# Patient Record
Sex: MALE | Race: WHITE | NOT HISPANIC OR LATINO | Employment: OTHER | URBAN - NONMETROPOLITAN AREA
[De-identification: names, ages, dates, MRNs, and addresses within clinical notes are randomized per-mention and may not be internally consistent; named-entity substitution may affect disease eponyms.]

---

## 2017-04-27 ENCOUNTER — HOSPITAL ENCOUNTER (OUTPATIENT)
Dept: MRI IMAGING | Facility: HOSPITAL | Age: 62
Discharge: HOME | End: 2017-04-27
Attending: FAMILY MEDICINE | Admitting: FAMILY MEDICINE
Payer: COMMERCIAL

## 2017-04-27 DIAGNOSIS — M25.511 RIGHT SHOULDER PAIN: Primary | ICD-10-CM

## 2017-04-27 PROCEDURE — 73221 MRI JOINT UPR EXTREM W/O DYE: CPT | Mod: TC,RT

## 2017-09-19 DIAGNOSIS — Z13.220 SCREENING FOR LIPOID DISORDERS: Primary | ICD-10-CM

## 2017-09-19 LAB
ALBUMIN SERPL-MCNC: 3.7 G/DL (ref 3.4–5)
ALBUMIN UR-MCNC: NEGATIVE MG/DL
ALP SERPL-CCNC: 102 U/L (ref 40–150)
ALT SERPL W P-5'-P-CCNC: 32 U/L (ref 0–70)
ANION GAP SERPL CALCULATED.3IONS-SCNC: 8 MMOL/L (ref 3–14)
APPEARANCE UR: CLEAR
AST SERPL W P-5'-P-CCNC: 21 U/L (ref 0–45)
BASOPHILS # BLD AUTO: 0 10E9/L (ref 0–0.2)
BASOPHILS NFR BLD AUTO: 0.8 %
BILIRUB SERPL-MCNC: 0.5 MG/DL (ref 0.2–1.3)
BILIRUB UR QL STRIP: NEGATIVE
BUN SERPL-MCNC: 25 MG/DL (ref 7–30)
CALCIUM SERPL-MCNC: 9.1 MG/DL (ref 8.5–10.1)
CHLORIDE SERPL-SCNC: 106 MMOL/L (ref 94–109)
CHOLEST SERPL-MCNC: 195 MG/DL
CO2 SERPL-SCNC: 27 MMOL/L (ref 20–32)
COLOR UR AUTO: NORMAL
CREAT SERPL-MCNC: 0.88 MG/DL (ref 0.66–1.25)
CREAT UR-MCNC: 46 MG/DL
DIFFERENTIAL METHOD BLD: ABNORMAL
EOSINOPHIL # BLD AUTO: 0.1 10E9/L (ref 0–0.7)
EOSINOPHIL NFR BLD AUTO: 2.3 %
ERYTHROCYTE [DISTWIDTH] IN BLOOD BY AUTOMATED COUNT: 15.3 % (ref 10–15)
ERYTHROCYTE [SEDIMENTATION RATE] IN BLOOD BY WESTERGREN METHOD: 7 MM/H (ref 0–20)
GFR SERPL CREATININE-BSD FRML MDRD: 88 ML/MIN/1.7M2
GLUCOSE SERPL-MCNC: 76 MG/DL (ref 70–99)
GLUCOSE UR STRIP-MCNC: NEGATIVE MG/DL
HCT VFR BLD AUTO: 37.6 % (ref 40–53)
HDLC SERPL-MCNC: 63 MG/DL
HGB BLD-MCNC: 13 G/DL (ref 13.3–17.7)
HGB UR QL STRIP: NEGATIVE
IMM GRANULOCYTES # BLD: 0 10E9/L (ref 0–0.4)
IMM GRANULOCYTES NFR BLD: 0.4 %
KETONES UR STRIP-MCNC: NEGATIVE MG/DL
LDLC SERPL CALC-MCNC: 116 MG/DL
LEUKOCYTE ESTERASE UR QL STRIP: NEGATIVE
LYMPHOCYTES # BLD AUTO: 1 10E9/L (ref 0.8–5.3)
LYMPHOCYTES NFR BLD AUTO: 19.8 %
MCH RBC QN AUTO: 30.2 PG (ref 26.5–33)
MCHC RBC AUTO-ENTMCNC: 34.6 G/DL (ref 31.5–36.5)
MCV RBC AUTO: 87 FL (ref 78–100)
MICROALBUMIN UR-MCNC: <5 MG/L
MICROALBUMIN/CREAT UR: NORMAL MG/G CR (ref 0–17)
MONOCYTES # BLD AUTO: 0.6 10E9/L (ref 0–1.3)
MONOCYTES NFR BLD AUTO: 12.2 %
NEUTROPHILS # BLD AUTO: 3.4 10E9/L (ref 1.6–8.3)
NEUTROPHILS NFR BLD AUTO: 64.5 %
NITRATE UR QL: NEGATIVE
NONHDLC SERPL-MCNC: 132 MG/DL
NRBC # BLD AUTO: 0 10*3/UL
NRBC BLD AUTO-RTO: 0 /100
PH UR STRIP: 5 PH (ref 4.7–8)
PLATELET # BLD AUTO: 269 10E9/L (ref 150–450)
POTASSIUM SERPL-SCNC: 4.3 MMOL/L (ref 3.4–5.3)
PROT SERPL-MCNC: 7.1 G/DL (ref 6.8–8.8)
RBC # BLD AUTO: 4.31 10E12/L (ref 4.4–5.9)
SODIUM SERPL-SCNC: 141 MMOL/L (ref 133–144)
SOURCE: NORMAL
SP GR UR STRIP: 1.01 (ref 1–1.03)
TRIGL SERPL-MCNC: 80 MG/DL
TSH SERPL DL<=0.005 MIU/L-ACNC: 1.35 MU/L (ref 0.4–4)
UROBILINOGEN UR STRIP-MCNC: NORMAL MG/DL (ref 0–2)
WBC # BLD AUTO: 5.3 10E9/L (ref 4–11)

## 2017-09-19 PROCEDURE — 81003 URINALYSIS AUTO W/O SCOPE: CPT | Performed by: FAMILY MEDICINE

## 2017-09-19 PROCEDURE — 85025 COMPLETE CBC W/AUTO DIFF WBC: CPT | Performed by: FAMILY MEDICINE

## 2017-09-19 PROCEDURE — 80061 LIPID PANEL: CPT | Performed by: FAMILY MEDICINE

## 2017-09-19 PROCEDURE — 84443 ASSAY THYROID STIM HORMONE: CPT | Performed by: FAMILY MEDICINE

## 2017-09-19 PROCEDURE — 36415 COLL VENOUS BLD VENIPUNCTURE: CPT | Performed by: FAMILY MEDICINE

## 2017-09-19 PROCEDURE — 82043 UR ALBUMIN QUANTITATIVE: CPT | Performed by: FAMILY MEDICINE

## 2017-09-19 PROCEDURE — 80053 COMPREHEN METABOLIC PANEL: CPT | Performed by: FAMILY MEDICINE

## 2017-09-19 PROCEDURE — 85652 RBC SED RATE AUTOMATED: CPT | Performed by: FAMILY MEDICINE

## 2017-09-25 ENCOUNTER — TRANSFERRED RECORDS (OUTPATIENT)
Dept: HEALTH INFORMATION MANAGEMENT | Facility: HOSPITAL | Age: 62
End: 2017-09-25

## 2017-10-02 DIAGNOSIS — Z12.5 SCREENING FOR PROSTATE CANCER: Primary | ICD-10-CM

## 2017-10-02 LAB — PSA SERPL-ACNC: 0.87 UG/L (ref 0–4)

## 2017-10-02 PROCEDURE — G0103 PSA SCREENING: HCPCS | Performed by: FAMILY MEDICINE

## 2017-10-02 PROCEDURE — 36415 COLL VENOUS BLD VENIPUNCTURE: CPT | Performed by: FAMILY MEDICINE

## 2017-11-21 ENCOUNTER — HOSPITAL ENCOUNTER (OUTPATIENT)
Dept: PHYSICAL THERAPY | Facility: HOSPITAL | Age: 62
Setting detail: THERAPIES SERIES
End: 2017-11-21
Attending: PHYSICIAN ASSISTANT
Payer: COMMERCIAL

## 2017-11-21 PROCEDURE — 97161 PT EVAL LOW COMPLEX 20 MIN: CPT | Mod: GP

## 2017-11-21 PROCEDURE — 97110 THERAPEUTIC EXERCISES: CPT | Mod: GP

## 2017-11-21 PROCEDURE — 40000718 ZZHC STATISTIC PT DEPARTMENT ORTHO VISIT

## 2017-11-21 NOTE — PROGRESS NOTES
11/21/17 1300   General Information   Type of Visit Initial OP Ortho PT Evaluation   Start of Care Date 11/21/17   Referring Physician Munir Powers PA-C   Orders Evaluate and Treat   Orders Comment Essential post op protocol. Phase 2a x3 wks, phase 2b x 3 wks, phase 3 x 6 wks   Date of Order 11/10/17   Insurance Type ([employer])   Medical Diagnosis s/p R total shoulder replacement   Surgical/Medical history reviewed Yes   Body Part(s)   Body Part(s) Shoulder   Presentation and Etiology   Pertinent history of current problem (include personal factors and/or comorbidities that impact the POC) Pt reports he had his shoulder replaced 10/24/17. His next appointment with his surgeon on Dec. 22. Reports he has been wearing his sling and not using shoulder. He has some trouble sleeping due to pain, otherwise shoulder feels good. Has been doing table slides.   Impairments A. Pain;D. Decreased ROM;E. Decreased flexibility;F. Decreased strength and endurance   Functional Limitations perform activities of daily living   Symptom Location R shoulder   Onset date of current episode/exacerbation 10/24/17   Chronicity New   Pain rating (0-10 point scale) Best (/10);Worst (/10)   Best (/10) 1   Worst (/10) 1   Pain quality B. Dull   Frequency of pain/symptoms C. With activity   Pain/symptoms are: Worse during the day   Pain/symptoms exacerbated by C. Lifting   Pain/symptoms eased by C. Rest   Progression of symptoms since onset: Improved   Current Level of Function   Patient role/employment history F. Retired   Living environment House/[facility]   Shoulder Objective Findings   Side (if bilateral, select both right and left) Right   Right Shoulder Flexion PROM 90 degrees   Right Shoulder Abduction PROM 70 degrees   Right Shoulder ER PROM 20 degrees   Right Shoulder IR PROM 5 degrees   Right Shoulder Flexion Strength MMT not performed due to post surgical status but obviously functional weakness   Planned Therapy  Interventions   Planned Therapy Interventions strengthening;stretching;ROM;manual therapy;joint mobilization   Planned Modality Interventions   Planned Modality Interventions Cryotherapy;Electrical stimulation;Hot packs;Ultrasound   Clinical Impression   Criteria for Skilled Therapeutic Interventions Met yes, treatment indicated   PT Diagnosis R shoulder weakness, decreased ROM, pain s/p TSA   Influenced by the following impairments RUE weakness, impaired ROM, pain   Functional limitations due to impairments Decreased functional use of RUE due to above impairments   Clinical Presentation Stable/Uncomplicated   Clinical Presentation Rationale Clinical judgement   Clinical Decision Making (Complexity) Low complexity   Therapy Frequency 2 times/Week   Predicted Duration of Therapy Intervention (days/wks) up to 12 wks   Risk & Benefits of therapy have been explained Yes   Patient, Family & other staff in agreement with plan of care Yes   ORTHO GOALS   PT Ortho Eval Goals 1;2;3   Ortho Goal 1   Goal Identifier STG 1   Goal Description Pt will demonstrate knowledge/understanding of HEP and report daily compliance   Target Date 12/05/17   Ortho Goal 2   Goal Identifier LTG 1   Goal Description Pt will demonstrate full AROM RUE in order to return to PLOF   Target Date 02/13/18   Ortho Goal 3   Goal Identifier LTG 2   Goal Description Pt will use RUE during all daily activities without being limited by pain or weakness   Target Date 02/13/18   Total Evaluation Time   Total Evaluation Time 15

## 2017-11-28 ENCOUNTER — HOSPITAL ENCOUNTER (OUTPATIENT)
Dept: PHYSICAL THERAPY | Facility: HOSPITAL | Age: 62
Setting detail: THERAPIES SERIES
End: 2017-11-28
Attending: FAMILY MEDICINE
Payer: COMMERCIAL

## 2017-11-28 PROCEDURE — 40000718 ZZHC STATISTIC PT DEPARTMENT ORTHO VISIT

## 2017-11-28 PROCEDURE — 97110 THERAPEUTIC EXERCISES: CPT | Mod: GP

## 2017-12-01 ENCOUNTER — HOSPITAL ENCOUNTER (OUTPATIENT)
Dept: PHYSICAL THERAPY | Facility: HOSPITAL | Age: 62
Setting detail: THERAPIES SERIES
End: 2017-12-01
Attending: FAMILY MEDICINE
Payer: COMMERCIAL

## 2017-12-01 PROCEDURE — 97110 THERAPEUTIC EXERCISES: CPT | Mod: GP

## 2017-12-01 PROCEDURE — 40000718 ZZHC STATISTIC PT DEPARTMENT ORTHO VISIT

## 2017-12-04 ENCOUNTER — HOSPITAL ENCOUNTER (OUTPATIENT)
Dept: PHYSICAL THERAPY | Facility: HOSPITAL | Age: 62
Setting detail: THERAPIES SERIES
End: 2017-12-04
Attending: FAMILY MEDICINE
Payer: COMMERCIAL

## 2017-12-04 PROCEDURE — 97110 THERAPEUTIC EXERCISES: CPT | Mod: GP

## 2017-12-04 PROCEDURE — 40000718 ZZHC STATISTIC PT DEPARTMENT ORTHO VISIT

## 2017-12-06 ENCOUNTER — HOSPITAL ENCOUNTER (OUTPATIENT)
Dept: PHYSICAL THERAPY | Facility: HOSPITAL | Age: 62
Setting detail: THERAPIES SERIES
End: 2017-12-06
Attending: FAMILY MEDICINE
Payer: COMMERCIAL

## 2017-12-06 PROCEDURE — 40000718 ZZHC STATISTIC PT DEPARTMENT ORTHO VISIT

## 2017-12-06 PROCEDURE — 97110 THERAPEUTIC EXERCISES: CPT | Mod: GP

## 2017-12-08 ENCOUNTER — ANESTHESIA (OUTPATIENT)
Dept: SURGERY | Facility: HOSPITAL | Age: 62
End: 2017-12-08
Payer: COMMERCIAL

## 2017-12-08 ENCOUNTER — HOSPITAL ENCOUNTER (OUTPATIENT)
Facility: HOSPITAL | Age: 62
Discharge: HOME | End: 2017-12-08
Attending: INTERNAL MEDICINE | Admitting: INTERNAL MEDICINE
Payer: COMMERCIAL

## 2017-12-08 ENCOUNTER — ANESTHESIA EVENT (OUTPATIENT)
Dept: SURGERY | Facility: HOSPITAL | Age: 62
End: 2017-12-08
Payer: COMMERCIAL

## 2017-12-08 VITALS
RESPIRATION RATE: 18 BRPM | DIASTOLIC BLOOD PRESSURE: 98 MMHG | HEART RATE: 96 BPM | SYSTOLIC BLOOD PRESSURE: 167 MMHG | WEIGHT: 232 LBS | BODY MASS INDEX: 32.48 KG/M2 | TEMPERATURE: 97.6 F | OXYGEN SATURATION: 99 % | HEIGHT: 71 IN

## 2017-12-08 PROCEDURE — 25000125 ZZHC RX 250: Performed by: NURSE ANESTHETIST, CERTIFIED REGISTERED

## 2017-12-08 PROCEDURE — 40000306 ZZH STATISTIC PRE PROC ASSESS II: Performed by: INTERNAL MEDICINE

## 2017-12-08 PROCEDURE — 25000128 H RX IP 250 OP 636: Performed by: ANESTHESIOLOGY

## 2017-12-08 PROCEDURE — 01999 UNLISTED ANES PROCEDURE: CPT | Performed by: NURSE ANESTHETIST, CERTIFIED REGISTERED

## 2017-12-08 PROCEDURE — 37000008 ZZH ANESTHESIA TECHNICAL FEE, 1ST 30 MIN: Performed by: INTERNAL MEDICINE

## 2017-12-08 PROCEDURE — 36000050 ZZH SURGERY LEVEL 2 1ST 30 MIN: Performed by: INTERNAL MEDICINE

## 2017-12-08 PROCEDURE — 25000128 H RX IP 250 OP 636: Performed by: NURSE ANESTHETIST, CERTIFIED REGISTERED

## 2017-12-08 PROCEDURE — 71000027 ZZH RECOVERY PHASE 2 EACH 15 MINS: Performed by: INTERNAL MEDICINE

## 2017-12-08 RX ORDER — SODIUM CHLORIDE, SODIUM LACTATE, POTASSIUM CHLORIDE, CALCIUM CHLORIDE 600; 310; 30; 20 MG/100ML; MG/100ML; MG/100ML; MG/100ML
INJECTION, SOLUTION INTRAVENOUS CONTINUOUS
Status: DISCONTINUED | OUTPATIENT
Start: 2017-12-08 | End: 2017-12-08 | Stop reason: HOSPADM

## 2017-12-08 RX ORDER — HYDRALAZINE HYDROCHLORIDE 20 MG/ML
2.5-5 INJECTION INTRAMUSCULAR; INTRAVENOUS EVERY 10 MIN PRN
Status: DISCONTINUED | OUTPATIENT
Start: 2017-12-08 | End: 2017-12-08 | Stop reason: HOSPADM

## 2017-12-08 RX ORDER — MEPERIDINE HYDROCHLORIDE 25 MG/ML
12.5 INJECTION INTRAMUSCULAR; INTRAVENOUS; SUBCUTANEOUS
Status: DISCONTINUED | OUTPATIENT
Start: 2017-12-08 | End: 2017-12-08 | Stop reason: HOSPADM

## 2017-12-08 RX ORDER — NALOXONE HYDROCHLORIDE 0.4 MG/ML
.1-.4 INJECTION, SOLUTION INTRAMUSCULAR; INTRAVENOUS; SUBCUTANEOUS
Status: DISCONTINUED | OUTPATIENT
Start: 2017-12-08 | End: 2017-12-08 | Stop reason: HOSPADM

## 2017-12-08 RX ORDER — KETOROLAC TROMETHAMINE 30 MG/ML
30 INJECTION, SOLUTION INTRAMUSCULAR; INTRAVENOUS EVERY 6 HOURS PRN
Status: DISCONTINUED | OUTPATIENT
Start: 2017-12-08 | End: 2017-12-08 | Stop reason: HOSPADM

## 2017-12-08 RX ORDER — FENTANYL CITRATE 50 UG/ML
25-50 INJECTION, SOLUTION INTRAMUSCULAR; INTRAVENOUS
Status: DISCONTINUED | OUTPATIENT
Start: 2017-12-08 | End: 2017-12-08 | Stop reason: HOSPADM

## 2017-12-08 RX ORDER — LIDOCAINE HYDROCHLORIDE 20 MG/ML
INJECTION, SOLUTION INFILTRATION; PERINEURAL PRN
Status: DISCONTINUED | OUTPATIENT
Start: 2017-12-08 | End: 2017-12-08

## 2017-12-08 RX ORDER — PROPOFOL 10 MG/ML
INJECTION, EMULSION INTRAVENOUS PRN
Status: DISCONTINUED | OUTPATIENT
Start: 2017-12-08 | End: 2017-12-08

## 2017-12-08 RX ORDER — ONDANSETRON 4 MG/1
4 TABLET, ORALLY DISINTEGRATING ORAL EVERY 30 MIN PRN
Status: DISCONTINUED | OUTPATIENT
Start: 2017-12-08 | End: 2017-12-08 | Stop reason: HOSPADM

## 2017-12-08 RX ORDER — LIDOCAINE 40 MG/G
CREAM TOPICAL
Status: DISCONTINUED | OUTPATIENT
Start: 2017-12-08 | End: 2017-12-08 | Stop reason: HOSPADM

## 2017-12-08 RX ORDER — PROMETHAZINE HYDROCHLORIDE 25 MG/ML
12.5 INJECTION, SOLUTION INTRAMUSCULAR; INTRAVENOUS
Status: DISCONTINUED | OUTPATIENT
Start: 2017-12-08 | End: 2017-12-08 | Stop reason: HOSPADM

## 2017-12-08 RX ORDER — DEXAMETHASONE SODIUM PHOSPHATE 4 MG/ML
4 INJECTION, SOLUTION INTRA-ARTICULAR; INTRALESIONAL; INTRAMUSCULAR; INTRAVENOUS; SOFT TISSUE EVERY 10 MIN PRN
Status: DISCONTINUED | OUTPATIENT
Start: 2017-12-08 | End: 2017-12-08 | Stop reason: HOSPADM

## 2017-12-08 RX ORDER — ONDANSETRON 2 MG/ML
4 INJECTION INTRAMUSCULAR; INTRAVENOUS EVERY 30 MIN PRN
Status: DISCONTINUED | OUTPATIENT
Start: 2017-12-08 | End: 2017-12-08 | Stop reason: HOSPADM

## 2017-12-08 RX ORDER — ALBUTEROL SULFATE 0.83 MG/ML
2.5 SOLUTION RESPIRATORY (INHALATION) EVERY 4 HOURS PRN
Status: DISCONTINUED | OUTPATIENT
Start: 2017-12-08 | End: 2017-12-08 | Stop reason: HOSPADM

## 2017-12-08 RX ADMIN — PROPOFOL 20 MG: 10 INJECTION, EMULSION INTRAVENOUS at 12:13

## 2017-12-08 RX ADMIN — PROPOFOL 20 MG: 10 INJECTION, EMULSION INTRAVENOUS at 12:12

## 2017-12-08 RX ADMIN — LIDOCAINE HYDROCHLORIDE 40 MG: 20 INJECTION, SOLUTION INFILTRATION; PERINEURAL at 12:12

## 2017-12-08 RX ADMIN — PROPOFOL 20 MG: 10 INJECTION, EMULSION INTRAVENOUS at 12:20

## 2017-12-08 RX ADMIN — SODIUM CHLORIDE, POTASSIUM CHLORIDE, SODIUM LACTATE AND CALCIUM CHLORIDE: 600; 310; 30; 20 INJECTION, SOLUTION INTRAVENOUS at 10:13

## 2017-12-08 RX ADMIN — MIDAZOLAM 2 MG: 1 INJECTION INTRAMUSCULAR; INTRAVENOUS at 12:09

## 2017-12-08 RX ADMIN — PROPOFOL 20 MG: 10 INJECTION, EMULSION INTRAVENOUS at 12:14

## 2017-12-08 RX ADMIN — PROPOFOL 20 MG: 10 INJECTION, EMULSION INTRAVENOUS at 12:22

## 2017-12-08 RX ADMIN — PROPOFOL 20 MG: 10 INJECTION, EMULSION INTRAVENOUS at 12:16

## 2017-12-08 RX ADMIN — PROPOFOL 20 MG: 10 INJECTION, EMULSION INTRAVENOUS at 12:15

## 2017-12-08 RX ADMIN — PROPOFOL 20 MG: 10 INJECTION, EMULSION INTRAVENOUS at 12:24

## 2017-12-08 ASSESSMENT — LIFESTYLE VARIABLES: TOBACCO_USE: 1

## 2017-12-08 NOTE — IP AVS SNAPSHOT
[facility] Preop/Phase II    [address] [address]    Phone:  [phone]                                       After Visit Summary   12/8/2017    Ramy Guillermo    MRN: [id number]           After Visit Summary Signature Page     I have received my discharge instructions, and my questions have been answered. I have discussed any challenges I see with this plan with the nurse or doctor.    ..........................................................................................................................................  Patient/Patient Representative Signature      ..........................................................................................................................................  Patient Representative Print Name and Relationship to Patient    ..................................................               ................................................  Date                                            Time    ..........................................................................................................................................  Reviewed by Signature/Title    ...................................................              ..............................................  Date                                                            Time

## 2017-12-08 NOTE — ANESTHESIA POSTPROCEDURE EVALUATION
Patient: Ramy Guillermo    Procedure(s):  COLONOSCOPY - Wound Class: II-Clean Contaminated    Diagnosis:HISTORY OF COLON POLYPS  Diagnosis Additional Information: No value filed.    Anesthesia Type:  MAC    Note:  Anesthesia Post Evaluation    Patient participation: Able to fully participate in evaluation  Level of consciousness: awake  Pain management: adequate  Airway patency: patent  Cardiovascular status: acceptable  Respiratory status: acceptable  Hydration status: acceptable  PONV: none     Anesthetic complications: None          Last vitals:  Vitals:    12/08/17 1014 12/08/17 1233 12/08/17 1235   BP: 152/97 148/92 140/88   Pulse: 96     Resp: 18 18 18   Temp: 97.6  F (36.4  C)     SpO2: 97% 94% 95%         Electronically Signed By: ABRAHAM Diaz CRNA  December 8, 2017  12:39 PM

## 2017-12-08 NOTE — ANESTHESIA PREPROCEDURE EVALUATION
Anesthesia Evaluation     . Pt has had prior anesthetic.     History of anesthetic complications   - PONV        ROS/MED HX    ENT/Pulmonary:     (+)sleep apnea, allergic rhinitis, other ENT- Velopharyngeal insufficiency, s/p UPPP, tobacco use, Past use quit 2003 packs/day  Intermittent asthma Last exacerbation: 2 weeks ago last used,Treatment: Inhaler prn,  doesn't use CPAP , . .    Neurologic:  - neg neurologic ROS     Cardiovascular:     (+) Dyslipidemia, hypertension-range: not on beta blocker, ---. : . . . :. . Previous cardiac testing date:results:date: results:ECG reviewed date:9/25/2017 results:SR@63, Possible LAE date: results:          METS/Exercise Tolerance:  >4 METS   Hematologic:     (+) Anemia, -      Musculoskeletal:   (+) arthritis, , , other musculoskeletal- DJD, Lumbar spondylosis      GI/Hepatic:     (+) GERD Asymptomatic on medication, bowel prep,       Renal/Genitourinary:     (+) Other Renal/ Genitourinary, Overactive bladder      Endo:     (+) Obesity, .      Psychiatric:  - neg psychiatric ROS       Infectious Disease:  - neg infectious disease ROS       Malignancy:      - no malignancy   Other:    (+) No chance of pregnancy C-spine cleared: N/A, no H/O Chronic Pain,no other significant disability   - neg other ROS                 Physical Exam  Normal systems: cardiovascular, pulmonary and dental    Airway   Mallampati: I  TM distance: >3 FB  Neck ROM: full    Dental     Cardiovascular   Rhythm and rate: regular and normal      Pulmonary    breath sounds clear to auscultation                    Anesthesia Plan      History & Physical Review  History and physical reviewed and following examination; no interval change.    ASA Status:  3 .        Plan for MAC with Intravenous and Propofol induction. Maintenance will be TIVA.  Reason for MAC:  Chronic cardiopulmonary disease (G9) and Other - see comments  PONV prophylaxis:  Ondansetron (or other 5HT-3)  Surgeon requests deep sedation. Patient  is an ASA 3. Will provide MAC.  Risks and benefits of MAC anesthetic discussed including dental damage, aspiration, loss of airway, conversion to general anesthetic, CV complications, MI, stroke, death. Pt wishes to proceed.       Postoperative Care  Postoperative pain management:  IV analgesics.      Consents  Anesthetic plan, risks, benefits and alternatives discussed with:  Patient..                          .

## 2017-12-08 NOTE — DISCHARGE INSTRUCTIONS
INSTRUCTIONS AFTER COLONOSCOPY    WHEN YOU ARE BACK HOME:    Plan to rest for an hour or two after you get home.    You may have some cramping or pressure until you pass gas.    You may resume your regular medications.    Eat a small, light meal at first, and then gradually return to normal meal sizes.  If you had a polyp removed:    Slight bleeding may occur.  You may have a slight blood stain on the toilet paper after a bowel movement.    To lessen the chance of bleeding, avoid heavy exercise for ONE WEEK.  This includes heavy lifting, vigorous sport activities, and heavy physical labor.  You may resume your normal sexual activity.      Avoid aspirin or aspirin products if instructed by your doctor.    WHAT TO WATCH FOR:  Problems rarely occur after the exam; however, it is important for you to watch for early signs of possible problems.  If you have     Unusual pain in your abdomen    Nausea and vomiting that persists    Excessive bleeding    Black or bloody bowel movements    Fever or temperature above 100.6 F  Please call your doctor ([facility] [phone]) or go to the nearest hospital emergency room.    Post-Anesthesia Patient Instructions    IMMEDIATELY FOLLOWING SURGERY:  Do not drive or operate machinery for the first twenty four hours after surgery.  Do not make any important decisions for twenty four hours after surgery or while taking narcotic pain medications or sedatives.  If you develop intractable nausea and vomiting or a severe headache please notify your doctor immediately.    FOLLOW-UP:  Please make an appointment with your surgeon as instructed. You do not need to follow up with anesthesia unless specifically instructed to do so.    WOUND CARE INSTRUCTIONS (if applicable):  Keep a dry clean dressing on the anesthesia/puncture wound site if there is drainage.  Once the wound has quit draining you may leave it open to air.  Generally you should leave the bandage intact for twenty four  hours unless there is drainage.  If the epidural site drains for more than 36-48 hours please call the anesthesia department.    QUESTIONS?:  Please feel free to call your physician or the hospital  if you have any questions, and they will be happy to assist you.

## 2017-12-08 NOTE — ANESTHESIA CARE TRANSFER NOTE
Patient: Ramy Guillermo    Procedure(s):  COLONOSCOPY - Wound Class: II-Clean Contaminated    Diagnosis: HISTORY OF COLON POLYPS  Diagnosis Additional Information: No value filed.    Anesthesia Type:   MAC     Note:  Airway :Nasal Cannula  Patient transferred to:Phase II  Comments: VSS, alert asymptomatic. Handoff Report: Identifed the Patient, Identified the Reponsible Provider, Reviewed the pertinent medical history, Discussed the surgical course, Reviewed Intra-OP anesthesia mangement and issues during anesthesia, Set expectations for post-procedure period and Allowed opportunity for questions and acknowledgement of understanding      Vitals: (Last set prior to Anesthesia Care Transfer)    CRNA VITALS  12/8/2017 1201 - 12/8/2017 1231      12/8/2017             Pulse: 66    SpO2: 100 %    Resp Rate (set): 8                Electronically Signed By: ABRAHAM Garcia CRNA  December 8, 2017  12:31 PM

## 2017-12-08 NOTE — H&P
Pre-Endoscopy History and Physical     Ramy Guillermo MRN# [id number]   YOB: 1955 Age: 62 year old     Date of Procedure: 12/8/2017  Primary care provider: Mary Wooten  Type of Endoscopy: Colonoscopy with possible biopsy, possible polypectomy  Reason for Procedure: Colon Polyps, Poor Prep  Type of Anesthesia Anticipated: MAC    HPI:    Ramy is a 62 year old male who will be undergoing the above procedure.      A history and physical has been performed. The patient's medications and allergies have been reviewed. The risks and benefits of the procedure and the sedation options and risks were discussed with the patient.  All questions were answered and informed consent was obtained.      He denies a personal or family history of anesthesia complications or bleeding disorders.     Patient Active Problem List   Diagnosis     Essential hypertension, benign     GERD (gastroesophageal reflux disease)     Seasonal allergic rhinitis     Lumbar spondylosis     Family history of early CAD     Overactive bladder     CARDIOVASCULAR SCREENING; LDL GOAL LESS THAN 130     Hypertension goal BP (blood pressure) < 140/90     Advanced directives, counseling/discussion     Varicose veins of lower extremities with complications     History of adenomatous polyp of colon     Erectile dysfunction     Chondromalacia, knee - right     Lateral meniscus tear - right     Medial meniscus tear - right     S/P arthroscopy of right knee     Osteoarthritis of shoulder     Biceps tendonitis     Overweight (BMI 25.0-29.9)     Dysphagia     Velopharyngeal insufficiency, acquired     Dysphonia     LPRD (laryngopharyngeal reflux disease)     Shoulder impingement     Rotator cuff dysfunction     Family history of colon cancer        Past Medical History:   Diagnosis Date     Allergic rhinitis      Certain adverse effects, not elsewhere classified, other     required more sedation preop R vein ablation     Colon adenoma 8/3/2012     colonoscopy due 2017      DJD (degenerative joint disease) of lumbar spine 2003    L4-L5 & L5-S1     Duodenal ulcer, unspecified as acute or chronic, without hemorrhage, perforation, or obstruction      Essential hypertension, benign 2006     GERD (gastroesophageal reflux disease)         Past Surgical History:   Procedure Laterality Date     ABLATE VEIN VARICOSE RADIO FREQUENCY WITH PHLEBECTOMY MULTIPLE STAB  10/7/11, 11/4/11    RT, LT Dr. Cruz Robert     ABLATE VEIN VARICOSE RADIO FREQUENCY WITH PHLEBECTOMY MULTIPLE STAB  10/7/2011    Procedure:ABLATE VEIN VARICOSE RADIO FREQUENCY WITH PHLEBECTOMY MULTIPLE STAB; Right Radio Frenquency with Greater Saphenous with  Phlebectomy Multiple Stab; Surgeon:CRUZ ROBERT; Location:[facility] OR     ABLATE VEIN VARICOSE RADIO FREQUENCY WITH PHLEBECTOMY MULTIPLE STAB  11/4/2011    Procedure:ABLATE VEIN VARICOSE RADIO FREQUENCY WITH PHLEBECTOMY MULTIPLE STAB; Left Radio Frenquency with Greater Saphenous Vein with  Bilateral Phlebectomy Multiple Stab Varicose Veins.; Surgeon:CRUZ ROBERT; Location:[facility] OR     ABLATE VEIN VARICOSE RADIO FREQUENCY WITH PHLEBECTOMY MULTIPLE STAB  11/28/2012    Procedure: ABLATE VEIN VARICOSE RADIO FREQUENCY WITH PHLEBECTOMY MULTIPLE STAB;   Bilateral stab Micro- phlebectomies;  Surgeon: Dany Mascorro MD;  Location: [facility] OR     ARTHROSCOPY KNEE  3/1/2013    Procedure: ARTHROSCOPY KNEE;  Right knee arthoscopy w/ medial menisectomy and chondroplasty;  Surgeon: Abiodun Jacobson MD;  Location: [facility] OR     C EXCIS BOWEL LESION(S),SINGLE ENTEROTOMY  1968    small intestine     COLONOSCOPY N/A 8/30/2016    Procedure: COLONOSCOPY;  Surgeon: Calos Post MD;  Location: [facility] OR     FLEXIBLE SIGMOIDOSCOPY  1/9/07     HC COLONOSCOPY W BIOPSY  8/3/12    proximal ascending, tubular adenoma     HC EXCISION OF UVULA  2001    for snoring     HC KNEE SCOPE,MED/LAT MENISECTOMY  7/11/05    RT + chondroplasty of medial femoral condyl & patellofemoral joint,   [place]     HC PARTIAL REMOVAL/REPAIR,ACROMION  13    Left, Neer     HC REPAIR BICEPS LONG TENDON  13    Left     HERNIA REPAIR, INGUINAL RT/LT  01/10/08    LT w/onlay mesh patch, Dr. Mchugh     JOINT REPLACEMENT, HIP RT/LT      RT hip RESURFACING arthroplasty, Dr. Ra Valdivia @[facility]       Social History   Substance Use Topics     Smoking status: Former Smoker     Quit date: 2003     Smokeless tobacco: Never Used     Alcohol use Yes      Comment: 3-4 /week       Family History   Problem Relation Age of Onset     Hypertension Father      GASTROINTESTINAL DISEASE Father      PUD     Colon Cancer Father 80     malignant rectal polyp     Myocardial Infarction Brother 48          Myocardial Infarction Mother      Thrombophilia Mother 75      of PE     Prostate Cancer Maternal Grandfather      Prostate Cancer Maternal Uncle      Prostate Cancer Paternal Uncle      DIABETES No family hx of      Anesthesia Reaction No family hx of      Blood Disease No family hx of        Prior to Admission medications    Medication Sig Start Date End Date Taking? Authorizing Provider   Naproxen Sodium (ALEVE PO) Take 220 mg by mouth 2 times daily as needed for moderate pain   Yes Reported, Patient   IBUPROFEN PO Take 200 mg by mouth every 6 hours as needed for moderate pain   Yes Reported, Patient   pramipexole (MIRAPEX) 0.5 MG tablet 0.5 mg At Bedtime  11/12/15  Yes Reported, Patient   omeprazole (PRILOSEC) 40 MG capsule Take 1 capsule (40 mg) by  mouth daily before a meal  for reflux. 10/20/15  Yes Jamshid Ventura MD   oxybutynin (DITROPAN) 5 MG tablet Take 1 tablet (5 mg) by  mouth 2 times daily as  needed for overactive  bladder. 6/16/15  Yes Jamshid Ventura MD   lisinopril (PRINIVIL,ZESTRIL) 20 MG tablet Take 1 tablet (20 mg) by mouth daily for blood pressure. 6/5/15  Yes Jamshid Ventura MD   zolpidem (AMBIEN) 10 MG tablet Take 0.5-1 tablets (5-10 mg) by mouth nightly as needed for sleep (take right  "at bedtime) 6/5/15  Yes Jamshid Ventura MD   sildenafil (VIAGRA) 100 MG tablet Take 0.5-1 tablets ( mg) by mouth daily as needed for erectile dysfunction (1-3 hours before intimacy) Maximum 1 dose per 24 hours. 12/16/14  Yes Jamshid Ventura MD   acetaminophen (TYLENOL) 325 MG tablet Take 2 tablets by mouth every 6 hours as needed.   Yes Reported, Patient   MULTI VITAMIN MENS OR TABS 1 TABLET DAILY   Yes Reported, Patient   methocarbamol (ROBAXIN) 750 MG tablet Take 1-2 tablets (750-1,500 mg) by mouth every 6 hours as needed for muscle spasm. 6/5/15   Jamshid Ventura MD       Allergies   Allergen Reactions     No Known Drug Allergies         REVIEW OF SYSTEMS:   5 point ROS negative except as noted above in HPI, including Gen., Resp., CV, GI &  system review.    PHYSICAL EXAM:   /97  Pulse 96  Temp 97.6  F (36.4  C) (Oral)  Resp 18  Ht 1.803 m (5' 11\")  Wt 105.2 kg (232 lb)  SpO2 97%  BMI 32.36 kg/m2 Estimated body mass index is 32.36 kg/(m^2) as calculated from the following:    Height as of this encounter: 1.803 m (5' 11\").    Weight as of this encounter: 105.2 kg (232 lb).   GENERAL APPEARANCE: alert, and oriented  MENTAL STATUS: alert  AIRWAY EXAM: Mallampatti Class I (visualization of the soft palate, fauces, uvula, anterior and posterior pillars)  RESP: lungs clear to auscultation - no rales, rhonchi or wheezes  CV: regular rates and rhythm  DIAGNOSTICS:    Not indicated    IMPRESSION   ASA Class 2 - Mild systemic disease    PLAN:   Plan for Colonoscopy with possible biopsy, possible polypectomy. We discussed the risks, benefits and alternatives and the patient wished to proceed.    The above has been forwarded to the consulting provider.      Signed Electronically by: Calos Post  December 8, 2017          "

## 2017-12-08 NOTE — OR NURSING
Expelled air.Denies discomfort.Took juice and cookies w/o nausea.Anesthesia and  in and spoke with patient.Patient and responsible adult given discharge instructions with no questions regarding instructions. Julio score 20. Pain level 0/10.  Discharged from unit via walkingPatient discharged to home.

## 2017-12-08 NOTE — BRIEF OP NOTE
[facility] Brief Op Note    Pre-operative diagnosis: HISTORY OF COLON POLYPS   Post-operative diagnosis Diverticulosis, Internal Hemorrhoids   Procedure: Procedure(s):  COLONOSCOPY - Wound Class: II-Clean Contaminated   Surgeon: Calos Post MD   Anesthesia: Monitor Anesthesia Care    Total IV fluids: (See anesthesia record)   Drains: None   Specimens: None   Findings: Diverticulosis, Internal Hemorrhoids   Complications: None   Condition: Stable   Comments: See dictated operative report for full details     Calos Post MD  12/8/2017  12:35 PM

## 2017-12-09 NOTE — OP NOTE
PRIMARY CARE PHYSICIAN:  Mary Wooten MD      PROCEDURE:  Colonoscopy.      PREOPERATIVE DIAGNOSES:    1.  History of polyps.   2.  Poor prep.      HISTORY OF PRESENT ILLNESS:  Please refer to H&P for further details.      PHYSICAL EXAMINATION:  Cardiopulmonary examination unchanged from previous exam.  Please refer to H&P for further details.      MEDICATIONS:  MAC per Anesthesia Service.  Monitoring parameters within normal limits throughout.      DESCRIPTION OF PROCEDURE:  After informed consent was obtained, Ramy Guillermo was placed in the left lateral decubitus position and adult video colonoscope was advanced all the way to the terminal ileum.  Copious irrigation and suctioning with slow withdrawal improved our views.  There was severe pancolonic diverticulosis.  No polyps were found during this colonoscopy.  A retroflexion view revealed small internal hemorrhoids clearly improved from before.  The air was desufflated and the scope was withdrawn fully and completely without any complications.      POSTPROCEDURE DIAGNOSES:   1.  Severe pancolonic diverticulosis.   2.  Internal hemorrhoids.      RECOMMENDATIONS:   1.  Repeat colonoscopy in 5 years.   2.  Discharge home.   3.  High-fiber diet.   4.  One tablespoon of Metamucil twice a day.      Thank you for allowing us to participate in his management.         SUNSHINE SANDOVAL MD             D: 2017 12:34   T: 2017 10:28   MT: TW      Name:     RAMY GUILLERMO   MRN:      [id number]        Account:        [id number]   :      1955           Procedure Date: 2017      Document: [id number]       cc: Mary Wooten MD

## 2017-12-12 ENCOUNTER — HOSPITAL ENCOUNTER (OUTPATIENT)
Dept: PHYSICAL THERAPY | Facility: HOSPITAL | Age: 62
Setting detail: THERAPIES SERIES
End: 2017-12-12
Attending: FAMILY MEDICINE
Payer: COMMERCIAL

## 2017-12-12 PROCEDURE — 97110 THERAPEUTIC EXERCISES: CPT | Mod: GP

## 2017-12-12 PROCEDURE — 40000718 ZZHC STATISTIC PT DEPARTMENT ORTHO VISIT

## 2017-12-15 ENCOUNTER — HOSPITAL ENCOUNTER (OUTPATIENT)
Dept: PHYSICAL THERAPY | Facility: HOSPITAL | Age: 62
Setting detail: THERAPIES SERIES
End: 2017-12-15
Attending: FAMILY MEDICINE
Payer: COMMERCIAL

## 2017-12-15 PROCEDURE — 97110 THERAPEUTIC EXERCISES: CPT | Mod: GP

## 2017-12-15 PROCEDURE — 40000718 ZZHC STATISTIC PT DEPARTMENT ORTHO VISIT

## 2017-12-19 ENCOUNTER — HOSPITAL ENCOUNTER (OUTPATIENT)
Dept: PHYSICAL THERAPY | Facility: HOSPITAL | Age: 62
Setting detail: THERAPIES SERIES
End: 2017-12-19
Attending: FAMILY MEDICINE
Payer: COMMERCIAL

## 2017-12-19 PROCEDURE — 97110 THERAPEUTIC EXERCISES: CPT | Mod: GP

## 2017-12-19 PROCEDURE — 40000718 ZZHC STATISTIC PT DEPARTMENT ORTHO VISIT

## 2017-12-21 ENCOUNTER — HOSPITAL ENCOUNTER (OUTPATIENT)
Dept: PHYSICAL THERAPY | Facility: HOSPITAL | Age: 62
Setting detail: THERAPIES SERIES
End: 2017-12-21
Attending: FAMILY MEDICINE
Payer: COMMERCIAL

## 2017-12-21 PROCEDURE — 97110 THERAPEUTIC EXERCISES: CPT | Mod: GP

## 2017-12-21 PROCEDURE — 40000718 ZZHC STATISTIC PT DEPARTMENT ORTHO VISIT

## 2017-12-21 NOTE — PROGRESS NOTES
Outpatient Physical Therapy Progress Note     Patient: Ramy Guillermo  : 1955    Beginning/End Dates of Reporting Period:  17 to 2017    Referring Provider: Munir Powers PA-C    Therapy Diagnosis: S/p R total shoulder. [facility] post-op protocol     Client Self Report:  Pt reports that shoulder feels good overall. Reports compliance c HEP. He is looking forward to being able to use the shoulder more soon.     Objective Measurements:  Passive flexion: 150 , internal rotation 50, not stretching into abduction or ER yet  Goals:  Goal Identifier STG 1   Goal Description Pt will demonstrate knowledge/understanding of HEP and report daily compliance   Target Date 17   Date Met      Progress: Met     Goal Identifier LTG 1   Goal Description Pt will demonstrate full AROM RUE in order to return to PLOF   Target Date 18   Date Met      Progress: Making good progress     Goal Identifier LTG 2   Goal Description Pt will use RUE during all daily activities without being limited by pain or weakness   Target Date 18   Date Met      Progress: Making good progress       Progress Toward Goals:   Progress this reporting period: Pt making great progress towards goals. Currently on phase 2b of protocol. Pt very compliant c HEP. Currently exercises per protocol include scap sets, seated passive flexion ROM on countertop, pulleys, supine cane flexion, active assisted wall climbs, pendulum   Plan:  Continue therapy per current plan of care.    Discharge:  No

## 2017-12-26 ENCOUNTER — HOSPITAL ENCOUNTER (OUTPATIENT)
Dept: PHYSICAL THERAPY | Facility: HOSPITAL | Age: 62
Setting detail: THERAPIES SERIES
End: 2017-12-26
Attending: FAMILY MEDICINE
Payer: COMMERCIAL

## 2017-12-26 PROCEDURE — 97110 THERAPEUTIC EXERCISES: CPT | Mod: GP

## 2017-12-26 PROCEDURE — 40000718 ZZHC STATISTIC PT DEPARTMENT ORTHO VISIT

## 2017-12-28 ENCOUNTER — HOSPITAL ENCOUNTER (OUTPATIENT)
Dept: PHYSICAL THERAPY | Facility: HOSPITAL | Age: 62
Setting detail: THERAPIES SERIES
End: 2017-12-28
Attending: FAMILY MEDICINE
Payer: COMMERCIAL

## 2017-12-28 PROCEDURE — 40000718 ZZHC STATISTIC PT DEPARTMENT ORTHO VISIT

## 2017-12-28 PROCEDURE — 97110 THERAPEUTIC EXERCISES: CPT | Mod: GP

## 2018-01-03 ENCOUNTER — HOSPITAL ENCOUNTER (OUTPATIENT)
Dept: PHYSICAL THERAPY | Facility: HOSPITAL | Age: 63
Setting detail: THERAPIES SERIES
End: 2018-01-03
Attending: FAMILY MEDICINE
Payer: COMMERCIAL

## 2018-01-03 PROCEDURE — 40000718 ZZHC STATISTIC PT DEPARTMENT ORTHO VISIT

## 2018-01-03 PROCEDURE — 97110 THERAPEUTIC EXERCISES: CPT | Mod: GP

## 2018-01-08 ENCOUNTER — HOSPITAL ENCOUNTER (OUTPATIENT)
Dept: PHYSICAL THERAPY | Facility: HOSPITAL | Age: 63
Setting detail: THERAPIES SERIES
End: 2018-01-08
Attending: FAMILY MEDICINE
Payer: COMMERCIAL

## 2018-01-08 PROCEDURE — 97110 THERAPEUTIC EXERCISES: CPT | Mod: GP

## 2018-01-08 PROCEDURE — 40000718 ZZHC STATISTIC PT DEPARTMENT ORTHO VISIT

## 2018-01-11 ENCOUNTER — HOSPITAL ENCOUNTER (OUTPATIENT)
Dept: PHYSICAL THERAPY | Facility: HOSPITAL | Age: 63
Setting detail: THERAPIES SERIES
End: 2018-01-11
Attending: FAMILY MEDICINE
Payer: COMMERCIAL

## 2018-01-11 PROCEDURE — 97110 THERAPEUTIC EXERCISES: CPT | Mod: GP

## 2018-01-11 PROCEDURE — 40000718 ZZHC STATISTIC PT DEPARTMENT ORTHO VISIT

## 2018-01-16 ENCOUNTER — HOSPITAL ENCOUNTER (OUTPATIENT)
Dept: PHYSICAL THERAPY | Facility: HOSPITAL | Age: 63
Setting detail: THERAPIES SERIES
End: 2018-01-16
Attending: FAMILY MEDICINE
Payer: COMMERCIAL

## 2018-01-16 PROCEDURE — 40000718 ZZHC STATISTIC PT DEPARTMENT ORTHO VISIT

## 2018-01-16 PROCEDURE — 97110 THERAPEUTIC EXERCISES: CPT | Mod: GP

## 2018-01-19 ENCOUNTER — HOSPITAL ENCOUNTER (OUTPATIENT)
Dept: PHYSICAL THERAPY | Facility: HOSPITAL | Age: 63
Setting detail: THERAPIES SERIES
End: 2018-01-19
Attending: FAMILY MEDICINE
Payer: COMMERCIAL

## 2018-01-19 PROCEDURE — 40000718 ZZHC STATISTIC PT DEPARTMENT ORTHO VISIT

## 2018-01-19 PROCEDURE — 97110 THERAPEUTIC EXERCISES: CPT | Mod: GP

## 2018-01-23 ENCOUNTER — HOSPITAL ENCOUNTER (OUTPATIENT)
Dept: PHYSICAL THERAPY | Facility: HOSPITAL | Age: 63
Setting detail: THERAPIES SERIES
End: 2018-01-23
Attending: FAMILY MEDICINE
Payer: COMMERCIAL

## 2018-01-23 PROCEDURE — 97110 THERAPEUTIC EXERCISES: CPT | Mod: GP

## 2018-01-23 PROCEDURE — 40000718 ZZHC STATISTIC PT DEPARTMENT ORTHO VISIT

## 2018-01-26 ENCOUNTER — HOSPITAL ENCOUNTER (OUTPATIENT)
Dept: PHYSICAL THERAPY | Facility: HOSPITAL | Age: 63
Setting detail: THERAPIES SERIES
End: 2018-01-26
Attending: FAMILY MEDICINE
Payer: COMMERCIAL

## 2018-01-26 PROCEDURE — 40000718 ZZHC STATISTIC PT DEPARTMENT ORTHO VISIT

## 2018-01-26 PROCEDURE — 97110 THERAPEUTIC EXERCISES: CPT | Mod: GP

## 2018-01-30 ENCOUNTER — HOSPITAL ENCOUNTER (OUTPATIENT)
Dept: PHYSICAL THERAPY | Facility: HOSPITAL | Age: 63
Setting detail: THERAPIES SERIES
End: 2018-01-30
Attending: FAMILY MEDICINE
Payer: COMMERCIAL

## 2018-01-30 PROCEDURE — 97110 THERAPEUTIC EXERCISES: CPT | Mod: GP

## 2018-01-30 PROCEDURE — 40000718 ZZHC STATISTIC PT DEPARTMENT ORTHO VISIT

## 2018-02-02 ENCOUNTER — HOSPITAL ENCOUNTER (OUTPATIENT)
Dept: PHYSICAL THERAPY | Facility: HOSPITAL | Age: 63
Setting detail: THERAPIES SERIES
End: 2018-02-02
Attending: FAMILY MEDICINE
Payer: COMMERCIAL

## 2018-02-02 PROCEDURE — 97110 THERAPEUTIC EXERCISES: CPT | Mod: GP

## 2018-02-02 PROCEDURE — 40000718 ZZHC STATISTIC PT DEPARTMENT ORTHO VISIT

## 2018-02-06 ENCOUNTER — HOSPITAL ENCOUNTER (OUTPATIENT)
Dept: PHYSICAL THERAPY | Facility: HOSPITAL | Age: 63
Setting detail: THERAPIES SERIES
End: 2018-02-06
Attending: FAMILY MEDICINE
Payer: COMMERCIAL

## 2018-02-06 PROCEDURE — 40000718 ZZHC STATISTIC PT DEPARTMENT ORTHO VISIT

## 2018-02-06 PROCEDURE — 97110 THERAPEUTIC EXERCISES: CPT | Mod: GP

## 2018-02-13 ENCOUNTER — HOSPITAL ENCOUNTER (OUTPATIENT)
Dept: PHYSICAL THERAPY | Facility: HOSPITAL | Age: 63
Setting detail: THERAPIES SERIES
End: 2018-02-13
Attending: FAMILY MEDICINE
Payer: COMMERCIAL

## 2018-02-13 PROCEDURE — 40000718 ZZHC STATISTIC PT DEPARTMENT ORTHO VISIT

## 2018-02-13 PROCEDURE — 97110 THERAPEUTIC EXERCISES: CPT | Mod: GP

## 2018-02-15 ENCOUNTER — HOSPITAL ENCOUNTER (OUTPATIENT)
Dept: PHYSICAL THERAPY | Facility: HOSPITAL | Age: 63
Setting detail: THERAPIES SERIES
End: 2018-02-15
Attending: FAMILY MEDICINE
Payer: COMMERCIAL

## 2018-02-15 PROCEDURE — 40000718 ZZHC STATISTIC PT DEPARTMENT ORTHO VISIT

## 2018-02-15 PROCEDURE — 97110 THERAPEUTIC EXERCISES: CPT | Mod: GP

## 2018-02-20 ENCOUNTER — HOSPITAL ENCOUNTER (OUTPATIENT)
Dept: PHYSICAL THERAPY | Facility: HOSPITAL | Age: 63
Setting detail: THERAPIES SERIES
End: 2018-02-20
Attending: FAMILY MEDICINE
Payer: COMMERCIAL

## 2018-02-20 PROCEDURE — 40000718 ZZHC STATISTIC PT DEPARTMENT ORTHO VISIT

## 2018-02-20 PROCEDURE — 40000268 ZZH STATISTIC NO CHARGES

## 2018-02-20 NOTE — PROGRESS NOTES
Outpatient Physical Therapy Progress Note     Patient: Ramy Guillermo  : 1955    Beginning/End Dates of Reporting Period:  18 to 2018    Referring Provider: Munir Powers PA-C    Therapy Diagnosis: s/p R total shoulder replacement     Client Self Report: Pt reports he is doing great. Reports therapy has been wonderful and he is very happy with his results.     Objective Measurements:  R shoulder flexion 160 degrees, 130 degrees abduction, IR to lower thoracic, ER full   Strength: flexion 4-/5, abduction 4-/5, ER 5/5, IR 5/5     Goals:  Goal Identifier STG 1   Goal Description Pt will demonstrate knowledge/understanding of HEP and report daily compliance   Target Date 17   Date Met      Progress: Goal met      Goal Identifier LTG 1   Goal Description Pt will demonstrate full AROM RUE in order to return to PLOF   Target Date 18   Date Met      Progress:     Goal Identifier LTG 2   Goal Description Pt will use RUE during all daily activities without being limited by pain or weakness   Target Date 18   Date Met      Progress:       Progress Toward Goals:   Progress this reporting period: Pt has made great progress with PT and continues to make progress with strengthening. Still has some mild weakness but he is compliant c HEP.  He is at the point now where he would like to just continue with his HEP and I agree that he is ready to do this.     Plan: Pt hopes he can just continue on his own but will see what the physician suggests

## 2018-08-20 ENCOUNTER — HOSPITAL ENCOUNTER (OUTPATIENT)
Dept: MRI IMAGING | Facility: HOSPITAL | Age: 63
Discharge: HOME | End: 2018-08-20
Attending: FAMILY MEDICINE | Admitting: FAMILY MEDICINE
Payer: COMMERCIAL

## 2018-08-20 ENCOUNTER — TRANSFERRED RECORDS (OUTPATIENT)
Dept: HEALTH INFORMATION MANAGEMENT | Facility: HOSPITAL | Age: 63
End: 2018-08-20

## 2018-08-20 DIAGNOSIS — M25.562 LEFT KNEE PAIN: ICD-10-CM

## 2018-08-20 DIAGNOSIS — M23.52 RECURRENT LEFT KNEE INSTABILITY: ICD-10-CM

## 2018-08-20 PROCEDURE — 73721 MRI JNT OF LWR EXTRE W/O DYE: CPT | Mod: TC,LT

## 2018-09-04 ENCOUNTER — OFFICE VISIT (OUTPATIENT)
Dept: ORTHOPEDICS | Facility: CLINIC | Age: 63
End: 2018-09-04
Payer: COMMERCIAL

## 2018-09-04 ENCOUNTER — RADIANT APPOINTMENT (OUTPATIENT)
Dept: GENERAL RADIOLOGY | Facility: CLINIC | Age: 63
End: 2018-09-04
Attending: ORTHOPAEDIC SURGERY
Payer: COMMERCIAL

## 2018-09-04 VITALS
BODY MASS INDEX: 30.48 KG/M2 | SYSTOLIC BLOOD PRESSURE: 203 MMHG | HEIGHT: 73 IN | WEIGHT: 230 LBS | OXYGEN SATURATION: 97 % | HEART RATE: 80 BPM | RESPIRATION RATE: 13 BRPM | DIASTOLIC BLOOD PRESSURE: 141 MMHG

## 2018-09-04 DIAGNOSIS — M17.12 PRIMARY OSTEOARTHRITIS OF LEFT KNEE: ICD-10-CM

## 2018-09-04 DIAGNOSIS — Z96.651 HISTORY OF TOTAL RIGHT KNEE REPLACEMENT: ICD-10-CM

## 2018-09-04 DIAGNOSIS — M17.12 PRIMARY OSTEOARTHRITIS OF LEFT KNEE: Primary | ICD-10-CM

## 2018-09-04 PROCEDURE — 20610 DRAIN/INJ JOINT/BURSA W/O US: CPT | Mod: LT | Performed by: ORTHOPAEDIC SURGERY

## 2018-09-04 PROCEDURE — 99214 OFFICE O/P EST MOD 30 MIN: CPT | Mod: 25 | Performed by: ORTHOPAEDIC SURGERY

## 2018-09-04 PROCEDURE — 73562 X-RAY EXAM OF KNEE 3: CPT | Mod: LT

## 2018-09-04 NOTE — LETTER
9/4/2018         RE: Ramy Guillermo  [address] [facility] [address]        Dear Colleague,    Thank you for referring your patient, Ramy Guillermo, to the [facility]. Please see a copy of my visit note below.    The patient's left knee was prepped with betadine solution after verification of allergies. Area approximately 10 cm x 10 cm prepped in a sterile fashion. After injection, betadine removed with soap and water and band-aids applied.    1ml depo-medrol with 1% lidocaine plain injected into patient's left knee by Dr. Abiodun Jacobson  LOT# [id number]  Exp. 11/19    Sahil Ovalles PA-C  Supervising physician: Abiodun Jacobson MD  Dept. of Orthopedics  [facility]          Ramy Guillermo is a 63 year old male who is seen as self referral for left knee pain.     Past Medical History:   Diagnosis Date     Allergic rhinitis      Certain adverse effects, not elsewhere classified, other     required more sedation preop R vein ablation     Colon adenoma 8/3/2012    colonoscopy due 2017      DJD (degenerative joint disease) of lumbar spine 2003    L4-L5 & L5-S1     Duodenal ulcer, unspecified as acute or chronic, without hemorrhage, perforation, or obstruction      Essential hypertension, benign 2006     GERD (gastroesophageal reflux disease)        Past Surgical History:   Procedure Laterality Date     ABLATE VEIN VARICOSE RADIO FREQUENCY WITH PHLEBECTOMY MULTIPLE STAB  10/7/11, 11/4/11    RT, LT Dr. Cruz Robert     ABLATE VEIN VARICOSE RADIO FREQUENCY WITH PHLEBECTOMY MULTIPLE STAB  10/7/2011    Procedure:ABLATE VEIN VARICOSE RADIO FREQUENCY WITH PHLEBECTOMY MULTIPLE STAB; Right Radio Frenquency with Greater Saphenous with  Phlebectomy Multiple Stab; Surgeon:CRUZ ROBERT; Location:[facility]     ABLATE VEIN VARICOSE RADIO FREQUENCY WITH PHLEBECTOMY MULTIPLE STAB  11/4/2011    Procedure:ABLATE VEIN VARICOSE RADIO FREQUENCY WITH PHLEBECTOMY MULTIPLE STAB; Left Radio Frenquency  with Greater Saphenous Vein with  Bilateral Phlebectomy Multiple Stab Varicose Veins.; Surgeon:MURPHY ROBERT; Location:[facility]     ABLATE VEIN VARICOSE RADIO FREQUENCY WITH PHLEBECTOMY MULTIPLE STAB  2012    Procedure: ABLATE VEIN VARICOSE RADIO FREQUENCY WITH PHLEBECTOMY MULTIPLE STAB;   Bilateral stab Micro- phlebectomies;  Surgeon: Dany Mascorro MD;  Location: [facility] OR     ARTHROSCOPY KNEE  3/1/2013    Procedure: ARTHROSCOPY KNEE;  Right knee arthoscopy w/ medial menisectomy and chondroplasty;  Surgeon: Abiodun Jacobson MD;  Location: [facility] OR     C EXCIS BOWEL LESION(S),SINGLE ENTEROTOMY  1968    small intestine     COLONOSCOPY N/A 2016    Procedure: COLONOSCOPY;  Surgeon: Calos Post MD;  Location: [facility]     COLONOSCOPY N/A 2017    Procedure: COLONOSCOPY;  COLONOSCOPY;  Surgeon: Calos Post MD;  Location: [facility]     FLEXIBLE SIGMOIDOSCOPY  07     HC COLONOSCOPY W BIOPSY  8/3/12    proximal ascending, tubular adenoma     HC EXCISION OF UVULA      for snoring     HC KNEE SCOPE,MED/LAT MENISECTOMY  05    RT + chondroplasty of medial femoral condyl & patellofemoral joint, Dr. Verma     HC PARTIAL REMOVAL/REPAIR,ACROMION  13    Left, Sachin     HC REPAIR BICEPS LONG TENDON  13    Left     HERNIA REPAIR, INGUINAL RT/LT  01/10/08    LT w/onlay mesh patch, Dr. Mchugh     JOINT REPLACEMENT, HIP RT/LT      RT hip RESURFACING arthroplasty, Dr. Ra Valdivia @[facility]       Family History   Problem Relation Age of Onset     Hypertension Father      GASTROINTESTINAL DISEASE Father      PUD     Colon Cancer Father 80     malignant rectal polyp     Myocardial Infarction Brother 48          Myocardial Infarction Mother      Thrombophilia Mother 75      of PE     Prostate Cancer Maternal Grandfather      Prostate Cancer Maternal Uncle      Prostate Cancer Paternal Uncle      Diabetes No family hx of      Anesthesia Reaction No family hx of      Blood Disease No family hx  of        Social History     Social History     Marital status: Single     Spouse name:      Number of children: 0     Years of education: 14+     Occupational History      [employer]     Social History Main Topics     Smoking status: Former Smoker     Quit date: 1/1/2003     Smokeless tobacco: Never Used     Alcohol use Yes      Comment: 3-4 /week     Drug use: No     Sexual activity: Yes     Partners: Female     Other Topics Concern     Parent/Sibling W/ Cabg, Mi Or Angioplasty Before 65f 55m? Yes     Social History Narrative       Current Outpatient Prescriptions   Medication Sig Dispense Refill     acetaminophen (TYLENOL) 325 MG tablet Take 2 tablets by mouth every 6 hours as needed.       IBUPROFEN PO Take 200 mg by mouth every 6 hours as needed for moderate pain       lisinopril (PRINIVIL,ZESTRIL) 20 MG tablet Take 1 tablet (20 mg) by mouth daily for blood pressure. 90 tablet 1     methocarbamol (ROBAXIN) 750 MG tablet Take 1-2 tablets (750-1,500 mg) by mouth every 6 hours as needed for muscle spasm. 90 tablet 1     methylPREDNISolone acetate (DEPO-MEDROL) 80 MG/ML injection 1 mL (80 mg) by INTRA-ARTICULAR route once for 1 dose 1 mL 0     MULTI VITAMIN MENS OR TABS 1 TABLET DAILY       Naproxen Sodium (ALEVE PO) Take 220 mg by mouth 2 times daily as needed for moderate pain       omeprazole (PRILOSEC) 40 MG capsule Take 1 capsule (40 mg) by  mouth daily before a meal  for reflux. 90 capsule 1     oxybutynin (DITROPAN) 5 MG tablet Take 1 tablet (5 mg) by  mouth 2 times daily as  needed for overactive  bladder. 180 tablet 0     pramipexole (MIRAPEX) 0.5 MG tablet 0.5 mg At Bedtime        sildenafil (VIAGRA) 100 MG tablet Take 0.5-1 tablets ( mg) by mouth daily as needed for erectile dysfunction (1-3 hours before intimacy) Maximum 1 dose per 24 hours. 30 tablet 12     zolpidem (AMBIEN) 10 MG tablet Take 0.5-1 tablets (5-10 mg) by mouth nightly as needed for sleep (take right at  "bedtime) 90 tablet 1       Allergies   Allergen Reactions     No Known Drug Allergies        REVIEW OF SYSTEMS:  CONSTITUTIONAL:  NEGATIVE for fever, chills, change in weight, not feeling tired  SKIN:  NEGATIVE for worrisome rashes, no skin lumps, no skin ulcers and no non-healing wounds  EYES:  NEGATIVE for vision changes or irritation.  ENT/MOUTH:  NEGATIVE.  No hearing loss, no hoarseness, no difficulty swallowing.  RESP:  NEGATIVE. No cough or shortness of breath.  CV:  NEGATIVE for chest pain, palpitations or peripheral edema  GI:  NEGATIVE for nausea, abdominal pain, heartburn, or change in bowel habits  :  Negative. No dysuria, no hematuria  MUSCULOSKELETAL:  See HPI above  NEURO:  NEGATIVE . No headaches, no dizziness,  no numbness  ENDOCRINE:  NEGATIVE for temperature intolerance, skin/hair changes  HEME/ALLERGY/IMMUNE:  NEGATIVE for bleeding problems  PSYCHIATRIC:  NEGATIVE. no anxiety, no depression.      Exam:  Vitals: BP (!) 203/141  Pulse 80  Resp 13  Ht 1.854 m (6' 1\")  Wt 104.3 kg (230 lb)  SpO2 97%  BMI 30.34 kg/m2  BMI= Body mass index is 30.34 kg/(m^2).  Constitutional:  healthy, alert and no distress  Neuro: Alert and Oriented x 3, Sensation grossly WNL.  HEENT:  Atraumatic, EOMI  Neck:  Neck supple with no tenderness.  Psych: Affect normal   Respiratory: Breathing not labored.  Cardiovascular: normal peripheral pulses  Lymph: no adenopathy  Skin: No rashes,worrisome lesions or skin problems  Spine: straight, no straight leg raising pain.  Hips show full range of motion.  There is no tenderness over the sacro-iliac joints, sciatic notch, or greater trochanters.       Again, thank you for allowing me to participate in the care of your patient.        Sincerely,        Abiodun Jacobson MD    "

## 2018-09-04 NOTE — MR AVS SNAPSHOT
After Visit Summary   9/4/2018    Ramy Guillermo    MRN: [id number]           Patient Information     Date Of Birth          1955        Visit Information        Provider Department      9/4/2018 10:45 AM Abiodun Jacobson MD [facility]        Today's Diagnoses     Primary osteoarthritis of left knee    -  1      Care Instructions    You have had a steroid injection today.  For the first 2 hours there will likely be some numbing in the joint from the lidocaine.  This is a good sign, indicating that the injection is in the right place.  In 2 hours the lidocaine will wear off, and the joint will hurt like you had a shot.  Each day the cortisone makes it feel better.  It reaches peak effect in 2 weeks.  We expect it to last for 3 months.  You may resume regular activity when you feel ready.  If you are diabetic, your glucoses will be quite high for several days.            Follow-ups after your visit        Who to contact     If you have questions or need follow up information about today's clinic visit or your schedule please contact [facility] directly at [phone].  Normal or non-critical lab and imaging results will be communicated to you by [employer], letter or phone within 4 business days after the clinic has received the results. If you do not hear from us within 7 days, please contact the clinic through ilustrumt or phone. If you have a critical or abnormal lab result, we will notify you by phone as soon as possible.  Submit refill requests through [email or web address] or call your pharmacy and they will forward the refill request to us. Please allow 3 business days for your refill to be completed.          Additional Information About Your Visit        MyChart Information     [email or web address] gives you secure access to your electronic health record. If you see a primary care provider, you can also send messages to your care team and make appointments. If you have  "questions, please call your primary care clinic.  If you do not have a primary care provider, please call [phone] and they will assist you.        Care EveryWhere ID     This is your Care EveryWhere ID. This could be used by other organizations to access your House medical records  [id number]        Your Vitals Were     Pulse Respirations Height Pulse Oximetry BMI (Body Mass Index)       80 13 1.854 m (6' 1\") 97% 30.34 kg/m2        Blood Pressure from Last 3 Encounters:   09/04/18 (!) 203/141   12/08/17 167/98   08/30/16 111/66    Weight from Last 3 Encounters:   09/04/18 104.3 kg (230 lb)   12/08/17 105.2 kg (232 lb)   08/30/16 100.6 kg (221 lb 12.8 oz)               Primary Care Provider Office Phone # Fax #    Mary Wooten [phone]       [facility] [address] [facility] [address]        Equal Access to Services     [facility]: Hadii aad ku hadasho Soomaali, waaxda luqadaha, qaybta kaalmada adeegyada, heron renteria . So [facility] [phone].    ATENCIÓN: Si habla español, tiene a landers disposición servicios gratuitos de asistencia lingüística. Llame al [phone].    We comply with applicable federal civil rights laws and [place] laws. We do not discriminate on the basis of race, color, national origin, age, disability, sex, sexual orientation, or gender identity.            Thank you!     Thank you for choosing [facility]  for your care. Our goal is always to provide you with excellent care. Hearing back from our patients is one way we can continue to improve our services. Please take a few minutes to complete the written survey that you may receive in the mail after your visit with us. Thank you!             Your Updated Medication List - Protect others around you: Learn how to safely use, store and throw away your medicines at www.disposemymeds.org.          This list is accurate as of 9/4/18 11:21 AM.  " Always use your most recent med list.                   Brand Name Dispense Instructions for use Diagnosis    ALEVE PO      Take 220 mg by mouth 2 times daily as needed for moderate pain        IBUPROFEN PO      Take 200 mg by mouth every 6 hours as needed for moderate pain        lisinopril 20 MG tablet    PRINIVIL/ZESTRIL    90 tablet    Take 1 tablet (20 mg) by mouth daily for blood pressure.    Essential hypertension, benign       methocarbamol 750 MG tablet    ROBAXIN    90 tablet    Take 1-2 tablets (750-1,500 mg) by mouth every 6 hours as needed for muscle spasm.    Essential hypertension, benign       MULTI vitamin  MENS Tabs      1 TABLET DAILY        omeprazole 40 MG capsule    priLOSEC    90 capsule    Take 1 capsule (40 mg) by  mouth daily before a meal  for reflux.    GERD (gastroesophageal reflux disease)       oxybutynin 5 MG tablet    DITROPAN    180 tablet    Take 1 tablet (5 mg) by  mouth 2 times daily as  needed for overactive  bladder.    Hypertonicity of bladder       pramipexole 0.5 MG tablet    MIRAPEX     0.5 mg At Bedtime        sildenafil 100 MG tablet    VIAGRA    30 tablet    Take 0.5-1 tablets ( mg) by mouth daily as needed for erectile dysfunction (1-3 hours before intimacy) Maximum 1 dose per 24 hours.    Erectile dysfunction       TYLENOL 325 MG tablet   Generic drug:  acetaminophen      Take 2 tablets by mouth every 6 hours as needed.        zolpidem 10 MG tablet    AMBIEN    90 tablet    Take 0.5-1 tablets (5-10 mg) by mouth nightly as needed for sleep (take right at bedtime)    Insomnia

## 2018-09-04 NOTE — PROGRESS NOTES
The patient's left knee was prepped with betadine solution after verification of allergies. Area approximately 10 cm x 10 cm prepped in a sterile fashion. After injection, betadine removed with soap and water and band-aids applied.    1ml depo-medrol with 1% lidocaine plain injected into patient's left knee by Dr. Abiodun Jacobson  LOT# [id number]  Exp. 11/19    Sahil Ovalles PA-C  Supervising physician: Abiodun Jacobson MD  Dept. of Orthopedics  [facility]

## 2018-09-05 RX ORDER — METHYLPREDNISOLONE ACETATE 80 MG/ML
80 INJECTION, SUSPENSION INTRA-ARTICULAR; INTRALESIONAL; INTRAMUSCULAR; SOFT TISSUE ONCE
Qty: 1 ML | Refills: 0 | OUTPATIENT
Start: 2018-09-05 | End: 2018-09-05

## 2018-09-05 NOTE — PROGRESS NOTES
Visit Date:   09/04/2018      DATE OF VISIT:  09/04/2018.      HISTORY OF PRESENT ILLNESS:  Mr. Guillermo is a 63-year-old male seen with left knee pain.  He has had pain for quite some time.  He has had increased pain over the past year, worse with walking and working; better if he sits in a chair with the leg elevated.  He has sharp pain rated 5/10, primarily over the medial aspect of the knee.  He has moved to [place], but is coming to see us for his knee.  He has had MRI scan of the left knee showing extensive arthritis and a complex tear of the medial meniscus with some extrusion.  There was advanced chondromalacia through the medial aspect of the knee, mild at the patellofemoral and lateral aspect.  Plain x-ray had not been obtained.  We obtained this today.  On the lateral view, it does show nearly bone on bone in the medial aspect of the knee.  The lateral aspect is better preserved.  Patellofemoral is not significantly involved.      PHYSICAL EXAMINATION:  Shows a good range of motion of the knees from 0-125 degrees.  He has good stability of MCL, LCL and cruciates.  He does have some patellofemoral crepitation.  He has pain primarily medially with tenderness along the medial joint line.  He did have medial pain with medial Angelica also.  He had a mild to moderate effusion with some swelling posteriorly.  Sensation and circulation are intact.  There is no increased warmth, erythema.      IMPRESSION:   1.  Left knee osteoarthritis.   2.  Medial meniscus tear.        PLAN:  The arthritis is more important as he is basically bone on bone.  I do not feel arthroscopy and debridement would help this much.  I have suggested either knee replacement or conservative treatment with anti-inflammatories and steroid injection.  He would like to proceed with steroid injection, and this was performed today with 80 mg Depo-Medrol and lidocaine.  He will continue Aleve 2 tablets b.i.d.  We will see back as needed.  He would be  a candidate for total knee arthroplasty if conservative treatment does not give him adequate relief.         LONNIE SOW MD             D: 2018   T: 2018   MT: MERON      Name:     CHADWICK HERNANDEZ   MRN:      [id number]        Account:      [id number]   :      1955           Visit Date:   2018      Document: [id number]

## 2018-09-05 NOTE — PROGRESS NOTES
Ramy Guillermo is a 63 year old male who is seen as self referral for left knee pain.     Past Medical History:   Diagnosis Date     Allergic rhinitis      Certain adverse effects, not elsewhere classified, other     required more sedation preop R vein ablation     Colon adenoma 8/3/2012    colonoscopy due 2017      DJD (degenerative joint disease) of lumbar spine 2003    L4-L5 & L5-S1     Duodenal ulcer, unspecified as acute or chronic, without hemorrhage, perforation, or obstruction      Essential hypertension, benign 2006     GERD (gastroesophageal reflux disease)        Past Surgical History:   Procedure Laterality Date     ABLATE VEIN VARICOSE RADIO FREQUENCY WITH PHLEBECTOMY MULTIPLE STAB  10/7/11, 11/4/11    RT, LT Dr. Cruz Robert     ABLATE VEIN VARICOSE RADIO FREQUENCY WITH PHLEBECTOMY MULTIPLE STAB  10/7/2011    Procedure:ABLATE VEIN VARICOSE RADIO FREQUENCY WITH PHLEBECTOMY MULTIPLE STAB; Right Radio Frenquency with Greater Saphenous with  Phlebectomy Multiple Stab; Surgeon:CRUZ ROBERT; [facility]     ABLATE VEIN VARICOSE RADIO FREQUENCY WITH PHLEBECTOMY MULTIPLE STAB  11/4/2011    Procedure:ABLATE VEIN VARICOSE RADIO FREQUENCY WITH PHLEBECTOMY MULTIPLE STAB; Left Radio Frenquency with Greater Saphenous Vein with  Bilateral Phlebectomy Multiple Stab Varicose Veins.; Surgeon:CRUZ ROBERT; [facility]     ABLATE VEIN VARICOSE RADIO FREQUENCY WITH PHLEBECTOMY MULTIPLE STAB  11/28/2012    Procedure: ABLATE VEIN VARICOSE RADIO FREQUENCY WITH PHLEBECTOMY MULTIPLE STAB;   Bilateral stab Micro- phlebectomies;  Surgeon: Dany Mascorro MD;  Location: [facility]     ARTHROSCOPY KNEE  3/1/2013    Procedure: ARTHROSCOPY KNEE;  Right knee arthoscopy w/ medial menisectomy and chondroplasty;  Surgeon: Abiodun Jacobson MD;  Location: [facility]     C EXCIS BOWEL LESION(S),SINGLE ENTEROTOMY  1968    small intestine     COLONOSCOPY N/A 8/30/2016    Procedure: COLONOSCOPY;  Surgeon: Calos Post MD;   [facility]     COLONOSCOPY N/A 2017    Procedure: COLONOSCOPY;  COLONOSCOPY;  Surgeon: Calos Post MD;  [facility]     FLEXIBLE SIGMOIDOSCOPY  07     HC COLONOSCOPY W BIOPSY  8/3/12    proximal ascending, tubular adenoma     HC EXCISION OF UVULA      for snoring     HC KNEE SCOPE,MED/LAT MENISECTOMY  05    RT + chondroplasty of medial femoral condyl & patellofemoral joint, Dr. Verma     HC PARTIAL REMOVAL/REPAIR,ACROMION  13    Left, Sachin     HC REPAIR BICEPS LONG TENDON  13    Left     HERNIA REPAIR, INGUINAL RT/LT  01/10/08    LT w/onlay mesh patch, Dr. Mchugh     JOINT REPLACEMENT, HIP RT/LT      RT hip RESURFACING arthroplasty, Dr. Ra Valdivia @[facility]       Family History   Problem Relation Age of Onset     Hypertension Father      GASTROINTESTINAL DISEASE Father      PUD     Colon Cancer Father 80     malignant rectal polyp     Myocardial Infarction Brother 48          Myocardial Infarction Mother      Thrombophilia Mother 75      of PE     Prostate Cancer Maternal Grandfather      Prostate Cancer Maternal Uncle      Prostate Cancer Paternal Uncle      Diabetes No family hx of      Anesthesia Reaction No family hx of      Blood Disease No family hx of        Social History     Social History     Marital status: Single     Spouse name:      Number of children: 0     Years of education: 14+     Occupational History      [employer]     Social History Main Topics     Smoking status: Former Smoker     Quit date: 2003     Smokeless tobacco: Never Used     Alcohol use Yes      Comment: 3-4 /week     Drug use: No     Sexual activity: Yes     Partners: Female     Other Topics Concern     Parent/Sibling W/ Cabg, Mi Or Angioplasty Before 65f 55m? Yes     Social History Narrative       Current Outpatient Prescriptions   Medication Sig Dispense Refill     acetaminophen (TYLENOL) 325 MG tablet Take 2 tablets by mouth every 6 hours as needed.        IBUPROFEN PO Take 200 mg by mouth every 6 hours as needed for moderate pain       lisinopril (PRINIVIL,ZESTRIL) 20 MG tablet Take 1 tablet (20 mg) by mouth daily for blood pressure. 90 tablet 1     methocarbamol (ROBAXIN) 750 MG tablet Take 1-2 tablets (750-1,500 mg) by mouth every 6 hours as needed for muscle spasm. 90 tablet 1     methylPREDNISolone acetate (DEPO-MEDROL) 80 MG/ML injection 1 mL (80 mg) by INTRA-ARTICULAR route once for 1 dose 1 mL 0     MULTI VITAMIN MENS OR TABS 1 TABLET DAILY       Naproxen Sodium (ALEVE PO) Take 220 mg by mouth 2 times daily as needed for moderate pain       omeprazole (PRILOSEC) 40 MG capsule Take 1 capsule (40 mg) by  mouth daily before a meal  for reflux. 90 capsule 1     oxybutynin (DITROPAN) 5 MG tablet Take 1 tablet (5 mg) by  mouth 2 times daily as  needed for overactive  bladder. 180 tablet 0     pramipexole (MIRAPEX) 0.5 MG tablet 0.5 mg At Bedtime        sildenafil (VIAGRA) 100 MG tablet Take 0.5-1 tablets ( mg) by mouth daily as needed for erectile dysfunction (1-3 hours before intimacy) Maximum 1 dose per 24 hours. 30 tablet 12     zolpidem (AMBIEN) 10 MG tablet Take 0.5-1 tablets (5-10 mg) by mouth nightly as needed for sleep (take right at bedtime) 90 tablet 1       Allergies   Allergen Reactions     No Known Drug Allergies        REVIEW OF SYSTEMS:  CONSTITUTIONAL:  NEGATIVE for fever, chills, change in weight, not feeling tired  SKIN:  NEGATIVE for worrisome rashes, no skin lumps, no skin ulcers and no non-healing wounds  EYES:  NEGATIVE for vision changes or irritation.  ENT/MOUTH:  NEGATIVE.  No hearing loss, no hoarseness, no difficulty swallowing.  RESP:  NEGATIVE. No cough or shortness of breath.  CV:  NEGATIVE for chest pain, palpitations or peripheral edema  GI:  NEGATIVE for nausea, abdominal pain, heartburn, or change in bowel habits  :  Negative. No dysuria, no hematuria  MUSCULOSKELETAL:  See HPI above  NEURO:  NEGATIVE . No headaches,  "no dizziness,  no numbness  ENDOCRINE:  NEGATIVE for temperature intolerance, skin/hair changes  HEME/ALLERGY/IMMUNE:  NEGATIVE for bleeding problems  PSYCHIATRIC:  NEGATIVE. no anxiety, no depression.      Exam:  Vitals: BP (!) 203/141  Pulse 80  Resp 13  Ht 1.854 m (6' 1\")  Wt 104.3 kg (230 lb)  SpO2 97%  BMI 30.34 kg/m2  BMI= Body mass index is 30.34 kg/(m^2).  Constitutional:  healthy, alert and no distress  Neuro: Alert and Oriented x 3, Sensation grossly WNL.  HEENT:  Atraumatic, EOMI  Neck:  Neck supple with no tenderness.  Psych: Affect normal   Respiratory: Breathing not labored.  Cardiovascular: normal peripheral pulses  Lymph: no adenopathy  Skin: No rashes,worrisome lesions or skin problems  Spine: straight, no straight leg raising pain.  Hips show full range of motion.  There is no tenderness over the sacro-iliac joints, sciatic notch, or greater trochanters.     "

## 2018-12-06 ENCOUNTER — TRANSFERRED RECORDS (OUTPATIENT)
Dept: HEALTH INFORMATION MANAGEMENT | Facility: HOSPITAL | Age: 63
End: 2018-12-06

## 2018-12-18 ENCOUNTER — HOSPITAL ENCOUNTER (INPATIENT)
Facility: HOSPITAL | Age: 63
Setting detail: SURGERY ADMIT
End: 2018-12-18
Attending: ORTHOPAEDIC SURGERY | Admitting: ORTHOPAEDIC SURGERY
Payer: COMMERCIAL

## 2019-01-29 ENCOUNTER — TRANSFERRED RECORDS (OUTPATIENT)
Dept: HEALTH INFORMATION MANAGEMENT | Facility: HOSPITAL | Age: 64
End: 2019-01-29

## 2019-01-29 LAB
ALT SERPL-CCNC: 19 U/L (ref 18–65)
AST SERPL-CCNC: 23 U/L (ref 10–40)
CREAT SERPL-MCNC: 0.99 MG/DL (ref 0.8–1.5)
GFR SERPL CREATININE-BSD FRML MDRD: >60 ML/MIN/1.73M2
GLUCOSE SERPL-MCNC: 75 MG/DL (ref 69–99)
POTASSIUM SERPL-SCNC: 4.4 MEQ/L (ref 3.5–5.1)

## 2019-02-08 NOTE — OR NURSING
RE:  Ramy Guillermo  [birth year]  Left Total Knee Arthroplasty by Dr. Cunha on 2/14/19  PCP: Dr. Carla Garner  Goal:  to go home after hospitalization.  Wants to go to P.T. at the hospital after surgery.  Lives with wife.  2 steps to get into home with handrail.  Everything on main floor.  Has had his right knee done previously as well as both hips and his right shoulder.  He is not attending the total joint class due to numerous surgeries previously. He has no complications from previous surgeries.  Has a history of HTN, GERD, OA, insomnia-takes Ambien to help him sleep.  One cat has residence in the house.  Laundry on main floor.  He has walker, crutches, canes, high rise toilet seat; grab bars, shower, reacher stick and is going to purchase sock aid device.      Reviewed the following topics with Ramy;   Call don t Fall , CAUTI education, Iceman Cooler, Capnography monitoring and Signs & Symptoms of Infection to watch/report and prevent.  He verbalized good understanding of all topics discussed.    Thank you!        Sent to [facility].  Selena Mcgee

## 2019-02-12 ENCOUNTER — ANESTHESIA EVENT (OUTPATIENT)
Dept: SURGERY | Facility: HOSPITAL | Age: 64
End: 2019-02-12

## 2019-02-12 RX ORDER — ONDANSETRON 2 MG/ML
4 INJECTION INTRAMUSCULAR; INTRAVENOUS EVERY 30 MIN PRN
Status: CANCELLED | OUTPATIENT
Start: 2019-02-12

## 2019-02-12 RX ORDER — HYDROMORPHONE HYDROCHLORIDE 1 MG/ML
.3-.5 INJECTION, SOLUTION INTRAMUSCULAR; INTRAVENOUS; SUBCUTANEOUS EVERY 10 MIN PRN
Status: CANCELLED | OUTPATIENT
Start: 2019-02-12

## 2019-02-12 RX ORDER — NALOXONE HYDROCHLORIDE 0.4 MG/ML
.1-.4 INJECTION, SOLUTION INTRAMUSCULAR; INTRAVENOUS; SUBCUTANEOUS
Status: CANCELLED | OUTPATIENT
Start: 2019-02-12 | End: 2019-02-13

## 2019-02-12 RX ORDER — FENTANYL CITRATE 50 UG/ML
25-50 INJECTION, SOLUTION INTRAMUSCULAR; INTRAVENOUS
Status: CANCELLED | OUTPATIENT
Start: 2019-02-12

## 2019-02-12 RX ORDER — SODIUM CHLORIDE, SODIUM LACTATE, POTASSIUM CHLORIDE, CALCIUM CHLORIDE 600; 310; 30; 20 MG/100ML; MG/100ML; MG/100ML; MG/100ML
INJECTION, SOLUTION INTRAVENOUS CONTINUOUS
Status: CANCELLED | OUTPATIENT
Start: 2019-02-12

## 2019-02-12 RX ORDER — MEPERIDINE HYDROCHLORIDE 50 MG/ML
12.5 INJECTION INTRAMUSCULAR; INTRAVENOUS; SUBCUTANEOUS
Status: CANCELLED | OUTPATIENT
Start: 2019-02-12

## 2019-02-12 RX ORDER — ALBUTEROL SULFATE 0.83 MG/ML
2.5 SOLUTION RESPIRATORY (INHALATION) EVERY 4 HOURS PRN
Status: CANCELLED | OUTPATIENT
Start: 2019-02-12

## 2019-02-12 RX ORDER — ONDANSETRON 4 MG/1
4 TABLET, ORALLY DISINTEGRATING ORAL EVERY 30 MIN PRN
Status: CANCELLED | OUTPATIENT
Start: 2019-02-12

## 2019-02-12 RX ORDER — HYDRALAZINE HYDROCHLORIDE 20 MG/ML
2.5-5 INJECTION INTRAMUSCULAR; INTRAVENOUS EVERY 10 MIN PRN
Status: CANCELLED | OUTPATIENT
Start: 2019-02-12

## 2019-02-12 NOTE — ANESTHESIA PREPROCEDURE EVALUATION
Anesthesia Pre-Procedure Evaluation    Patient: Ramy Guillermo   MRN: [id number] : 1955          Preoperative Diagnosis: LEFT KNEE PAIN    Procedure(s):  LEFT TOTAL KNEE ARTHROPLASTY/PERSONA CR    Past Medical History:   Diagnosis Date     Allergic rhinitis      Certain adverse effects, not elsewhere classified, other     required more sedation preop R vein ablation     Colon adenoma 8/3/2012    colonoscopy due 2017      DJD (degenerative joint disease) of lumbar spine     L4-L5 & L5-S1     Duodenal ulcer, unspecified as acute or chronic, without hemorrhage, perforation, or obstruction      Essential hypertension, benign      GERD (gastroesophageal reflux disease)      PONV (postoperative nausea and vomiting)      Past Surgical History:   Procedure Laterality Date     ABLATE VEIN VARICOSE RADIO FREQUENCY WITH PHLEBECTOMY MULTIPLE STAB  10/7/11, 11    RT, LT Dr. Cruz Robert     ABLATE VEIN VARICOSE RADIO FREQUENCY WITH PHLEBECTOMY MULTIPLE STAB  10/7/2011    Procedure:ABLATE VEIN VARICOSE RADIO FREQUENCY WITH PHLEBECTOMY MULTIPLE STAB; Right Radio Frenquency with Greater Saphenous with  Phlebectomy Multiple Stab; Surgeon:CRUZ ROBERT; [facility]     ABLATE VEIN VARICOSE RADIO FREQUENCY WITH PHLEBECTOMY MULTIPLE STAB  2011    Procedure:ABLATE VEIN VARICOSE RADIO FREQUENCY WITH PHLEBECTOMY MULTIPLE STAB; Left Radio Frenquency with Greater Saphenous Vein with  Bilateral Phlebectomy Multiple Stab Varicose Veins.; Surgeon:CRUZ ROBERT; [facility]     ABLATE VEIN VARICOSE RADIO FREQUENCY WITH PHLEBECTOMY MULTIPLE STAB  2012    Procedure: ABLATE VEIN VARICOSE RADIO FREQUENCY WITH PHLEBECTOMY MULTIPLE STAB;   Bilateral stab Micro- phlebectomies;  Surgeon: Dany Mascorro MD;  Location:  OR     ARTHROSCOPY KNEE  3/1/2013    Procedure: ARTHROSCOPY KNEE;  Right knee arthoscopy w/ medial menisectomy and chondroplasty;  Surgeon: Abiodun Jacobson MD;  Location:  OR     C  EXCIS BOWEL LESION(S),SINGLE ENTEROTOMY  1968    small intestine     C RECONSTR TOTAL SHOULDER IMPLANT Right 10/24/2017     COLONOSCOPY N/A 8/30/2016    Procedure: COLONOSCOPY;  Surgeon: Calos Post MD;  [facility]     COLONOSCOPY N/A 12/8/2017    Procedure: COLONOSCOPY;  COLONOSCOPY;  Surgeon: Calos Post MD;  [facility]     FLEXIBLE SIGMOIDOSCOPY  1/9/07     HC COLONOSCOPY W BIOPSY  8/3/12    proximal ascending, tubular adenoma     HC EXCISION OF UVULA  2001    for snoring     HC KNEE SCOPE,MED/LAT MENISECTOMY  7/11/05    RT + chondroplasty of medial femoral condyl & patellofemoral joint, Dr. Verma     HC PARTIAL REMOVAL/REPAIR,ACROMION  12/20/13    Left, Sachin     HC REPAIR BICEPS LONG TENDON  12/20/13    Left     HERNIA REPAIR, INGUINAL RT/LT  01/10/08    LT w/onlay mesh patch, Dr. Mchugh     JOINT REPLACEMENT, HIP RT/LT  9/06    RT hip RESURFACING arthroplasty, Dr. Ra Valdivia @[facility]       Anesthesia Evaluation     . Pt has had prior anesthetic. Type: General (Hx vocal cord injury with 12/2013 shoulder surgery)    History of anesthetic complications   - PONV  Mild PONV with shoulder surgery.      ROS/MED HX    ENT/Pulmonary: Comment: Hx bronchitis early Jan, cleared on HP day.    (+)allergic rhinitis, , . .    Neurologic: Comment: RLS      Cardiovascular:     (+) Dyslipidemia, hypertension----. : . . . :. . Previous cardiac testing date:results:date: results:ECG reviewed date:1-30-19 results:NSR date: results:          METS/Exercise Tolerance:     Hematologic:         Musculoskeletal:   (+) arthritis, , , -       GI/Hepatic:     (+) GERD Asymptomatic on medication,       Renal/Genitourinary:         Endo:         Psychiatric:         Infectious Disease:         Malignancy:         Other:                                 Lab Results   Component Value Date    WBC 5.3 09/19/2017    HGB 13.0 (L) 09/19/2017    HCT 37.6 (L) 09/19/2017     09/19/2017    SED 7 09/19/2017     09/19/2017     "POTASSIUM 4.4 01/29/2019    CHLORIDE 106 09/19/2017    CO2 27 09/19/2017    BUN 25 09/19/2017    CR 0.99 01/29/2019    GLC 75 01/29/2019    RACHEL 9.1 09/19/2017    ALBUMIN 3.7 09/19/2017    PROTTOTAL 7.1 09/19/2017    ALT 19 01/29/2019    AST 23 01/29/2019    ALKPHOS 102 09/19/2017    BILITOTAL 0.5 09/19/2017    INR 0.96 02/26/2013    TSH 1.35 09/19/2017       Preop Vitals  BP Readings from Last 3 Encounters:   09/04/18 (!) 203/141   12/08/17 167/98   08/30/16 111/66    Pulse Readings from Last 3 Encounters:   09/04/18 80   12/08/17 96   04/09/16 84      Resp Readings from Last 3 Encounters:   09/04/18 13   12/08/17 18   08/30/16 16    SpO2 Readings from Last 3 Encounters:   09/04/18 97%   12/08/17 99%   08/30/16 97%      Temp Readings from Last 1 Encounters:   12/08/17 97.6  F (36.4  C) (Oral)    Ht Readings from Last 1 Encounters:   09/04/18 1.854 m (6' 1\")      Wt Readings from Last 1 Encounters:   09/04/18 104.3 kg (230 lb)    Estimated body mass index is 30.34 kg/m  as calculated from the following:    Height as of 9/4/18: 1.854 m (6' 1\").    Weight as of 9/4/18: 104.3 kg (230 lb).       Anesthesia Plan      History & Physical Review      ASA Status:  2 .        Plan for Spinal, MAC and Periph. Nerve Block for postop pain Maintenance will be TIVA.  Reason for MAC:  Extreme anxiety (QS), Deep or markedly invasive procedure (G8) and Difficulty with conscious sedation (QS)     1-29-19      Postoperative Care      Consents  Anesthetic plan, risks, benefits and alternatives discussed with:  Patient..                 Simon Watson APRN CRNA  "

## 2019-02-14 ENCOUNTER — ANESTHESIA (OUTPATIENT)
Dept: SURGERY | Facility: HOSPITAL | Age: 64
End: 2019-02-14

## 2019-03-27 ENCOUNTER — TRANSFERRED RECORDS (OUTPATIENT)
Dept: SURGERY | Facility: HOSPITAL | Age: 64
End: 2019-03-27

## 2019-04-04 NOTE — OR NURSING
We have Ramy Guillermo [birth year] coming 4/11/19 for LTKA with Dr. Cunha, PCP SHARDA Garner.  Sal had a RTKA 2 years ago and both right and left total hip replacements done.  He did not attend a total joint replacement class.     He is already preparing for his upcoming surgery.  he plans on going to his home in [place], [place] after hospitalization with his wife Haylie who is an RN and works at [employer].  Ramy is not sure about Physical Therapy coming to his house for the first couple weeks.   He has a ground level house, no steps or basement.  He says his wife is very helpful.  Ramy has all the equipment from his first total joint replacement-walker, high rise toilet seat, grab bars, reacher stick, and a tub/sh  and a walk in shower.  He has a history of HTN, reflux, osteoarthritis. He is not on any blood thinners.  Reviewed the following topics with Ramy;  Call don t Fall , CAUTI education, Capnography monitoring, and Signs & Symptoms of Infections to watch/report and prevent.  He verbalized good understanding of all topics discussed.    Thank you,  Selena Mcgee R.N.  Pre-Anesthesia Testing Surgical Education    [place] [address] [place]  [address]  maria [email or web address]    Office: [phone]  Fax [phone]

## 2019-04-10 ENCOUNTER — ANESTHESIA EVENT (OUTPATIENT)
Dept: SURGERY | Facility: HOSPITAL | Age: 64
DRG: 470 | End: 2019-04-10
Payer: COMMERCIAL

## 2019-04-10 ASSESSMENT — LIFESTYLE VARIABLES: TOBACCO_USE: 1

## 2019-04-10 NOTE — ANESTHESIA PREPROCEDURE EVALUATION
Anesthesia Pre-Procedure Evaluation    Patient: Ramy Guillermo   MRN: [id number] : 1955          Preoperative Diagnosis: LEFT KNEE PAIN    Procedure(s):  LEFT TOTAL KNEE ARTHROPLASTY/PERSONA    Past Medical History:   Diagnosis Date     Allergic rhinitis      Certain adverse effects, not elsewhere classified, other     required more sedation preop R vein ablation     Colon adenoma 8/3/2012    colonoscopy due 2017      DJD (degenerative joint disease) of lumbar spine     L4-L5 & L5-S1     Duodenal ulcer, unspecified as acute or chronic, without hemorrhage, perforation, or obstruction      Essential hypertension, benign      GERD (gastroesophageal reflux disease)      PONV (postoperative nausea and vomiting)      Past Surgical History:   Procedure Laterality Date     ABLATE VEIN VARICOSE RADIO FREQUENCY WITH PHLEBECTOMY MULTIPLE STAB  10/7/11, 11    RT, LT Dr. Cruz Robert     ABLATE VEIN VARICOSE RADIO FREQUENCY WITH PHLEBECTOMY MULTIPLE STAB  10/7/2011    Procedure:ABLATE VEIN VARICOSE RADIO FREQUENCY WITH PHLEBECTOMY MULTIPLE STAB; Right Radio Frenquency with Greater Saphenous with  Phlebectomy Multiple Stab; Surgeon:CRUZ ROBERT; [facility]     ABLATE VEIN VARICOSE RADIO FREQUENCY WITH PHLEBECTOMY MULTIPLE STAB  2011    Procedure:ABLATE VEIN VARICOSE RADIO FREQUENCY WITH PHLEBECTOMY MULTIPLE STAB; Left Radio Frenquency with Greater Saphenous Vein with  Bilateral Phlebectomy Multiple Stab Varicose Veins.; Surgeon:CRUZ ROBERT; [facility]     ABLATE VEIN VARICOSE RADIO FREQUENCY WITH PHLEBECTOMY MULTIPLE STAB  2012    Procedure: ABLATE VEIN VARICOSE RADIO FREQUENCY WITH PHLEBECTOMY MULTIPLE STAB;   Bilateral stab Micro- phlebectomies;  Surgeon: Dany Mascorro MD;  Location: [facility]     ARTHROSCOPY KNEE  3/1/2013    Procedure: ARTHROSCOPY KNEE;  Right knee arthoscopy w/ medial menisectomy and chondroplasty;  Surgeon: Abiodun Jacobson MD;  Location:  OR     C  EXCIS BOWEL LESION(S),SINGLE ENTEROTOMY  1968    small intestine     C RECONSTR TOTAL SHOULDER IMPLANT Right 10/24/2017     COLONOSCOPY N/A 8/30/2016    Procedure: COLONOSCOPY;  Surgeon: Calos Post MD;  [facility]     COLONOSCOPY N/A 12/8/2017    Procedure: COLONOSCOPY;  COLONOSCOPY;  Surgeon: Calos Post MD;  [facility]     FLEXIBLE SIGMOIDOSCOPY  1/9/07     HC COLONOSCOPY W BIOPSY  8/3/12    proximal ascending, tubular adenoma     HC EXCISION OF UVULA  2001    for snoring     HC KNEE SCOPE,MED/LAT MENISECTOMY  7/11/05    RT + chondroplasty of medial femoral condyl & patellofemoral joint, Dr. Verma     HC PARTIAL REMOVAL/REPAIR,ACROMION  12/20/13    Left, Sachin     HC REPAIR BICEPS LONG TENDON  12/20/13    Left     HERNIA REPAIR, INGUINAL RT/LT  01/10/08    LT w/onlay mesh patch, Dr. Mchugh     JOINT REPLACEMENT, HIP RT/LT  9/06    RT hip RESURFACING arthroplasty, Dr. Ra Valdivia @[facility]       Anesthesia Evaluation     . Pt has had prior anesthetic.     History of anesthetic complications   - PONV        ROS/MED HX    ENT/Pulmonary:     (+)sleep apnea, allergic rhinitis, other ENT- Velopharyngeal insufficiency, s/p UPPP, tobacco use, Past use quit 2003  packs/day  Intermittent asthma Treatment: Inhaler prn,  doesn't use CPAP , . .    Neurologic:  - neg neurologic ROS     Cardiovascular:     (+) Dyslipidemia, hypertension----. : . . . :. .       METS/Exercise Tolerance:     Hematologic:  - neg hematologic  ROS       Musculoskeletal:   (+) arthritis,  other musculoskeletal-  DJD, Lumbar spondylosis       GI/Hepatic:     (+) GERD       Renal/Genitourinary:     (+) Other Renal/ Genitourinary, Overactive bladder       Endo:  - neg endo ROS       Psychiatric:  - neg psychiatric ROS       Infectious Disease:  - neg infectious disease ROS       Malignancy:      - no malignancy   Other:    - neg other ROS                      Physical Exam  Normal systems: cardiovascular, pulmonary and dental    Airway  "  Mallampati: II  TM distance: >3 FB  Neck ROM: full    Dental     Cardiovascular   Rhythm and rate: regular and normal      Pulmonary    breath sounds clear to auscultation            Lab Results   Component Value Date    WBC 5.3 09/19/2017    HGB 13.0 (L) 09/19/2017    HCT 37.6 (L) 09/19/2017     09/19/2017    SED 7 09/19/2017     09/19/2017    POTASSIUM 4.4 01/29/2019    CHLORIDE 106 09/19/2017    CO2 27 09/19/2017    BUN 25 09/19/2017    CR 0.99 01/29/2019    GLC 75 01/29/2019    RACHEL 9.1 09/19/2017    ALBUMIN 3.7 09/19/2017    PROTTOTAL 7.1 09/19/2017    ALT 19 01/29/2019    AST 23 01/29/2019    ALKPHOS 102 09/19/2017    BILITOTAL 0.5 09/19/2017    INR 0.96 02/26/2013    TSH 1.35 09/19/2017       Preop Vitals  BP Readings from Last 3 Encounters:   09/04/18 (!) 203/141   12/08/17 167/98   08/30/16 111/66    Pulse Readings from Last 3 Encounters:   09/04/18 80   12/08/17 96   04/09/16 84      Resp Readings from Last 3 Encounters:   09/04/18 13   12/08/17 18   08/30/16 16    SpO2 Readings from Last 3 Encounters:   09/04/18 97%   12/08/17 99%   08/30/16 97%      Temp Readings from Last 1 Encounters:   12/08/17 97.6  F (36.4  C) (Oral)    Ht Readings from Last 1 Encounters:   09/04/18 1.854 m (6' 1\")      Wt Readings from Last 1 Encounters:   09/04/18 104.3 kg (230 lb)    Estimated body mass index is 30.34 kg/m  as calculated from the following:    Height as of 9/4/18: 1.854 m (6' 1\").    Weight as of 9/4/18: 104.3 kg (230 lb).       Anesthesia Plan      History & Physical Review  History and physical reviewed and following examination; no interval change.    ASA Status:  3 .    NPO Status:  > 8 hours    Plan for Spinal and Periph. Nerve Block for postop pain with Intravenous and Propofol induction. Maintenance will be TIVA.    PONV prophylaxis:  Ondansetron (or other 5HT-3), Dexamethasone or Solumedrol and Scopolamine patch       Postoperative Care  Postoperative pain management:  IV analgesics, Oral " pain medications, Neuraxial analgesia and Peripheral nerve block (Single Shot).      Consents  Anesthetic plan, risks, benefits and alternatives discussed with:  Patient..                 Kodak Alcaraz MD

## 2019-04-11 ENCOUNTER — HOSPITAL ENCOUNTER (INPATIENT)
Facility: HOSPITAL | Age: 64
LOS: 2 days | Discharge: HOME HEALTH | DRG: 470 | End: 2019-04-13
Attending: ORTHOPAEDIC SURGERY | Admitting: ORTHOPAEDIC SURGERY
Payer: COMMERCIAL

## 2019-04-11 ENCOUNTER — ANESTHESIA (OUTPATIENT)
Dept: SURGERY | Facility: HOSPITAL | Age: 64
DRG: 470 | End: 2019-04-11
Payer: COMMERCIAL

## 2019-04-11 ENCOUNTER — APPOINTMENT (OUTPATIENT)
Dept: PHYSICAL THERAPY | Facility: HOSPITAL | Age: 64
DRG: 470 | End: 2019-04-11
Attending: ORTHOPAEDIC SURGERY
Payer: COMMERCIAL

## 2019-04-11 ENCOUNTER — APPOINTMENT (OUTPATIENT)
Dept: GENERAL RADIOLOGY | Facility: HOSPITAL | Age: 64
DRG: 470 | End: 2019-04-11
Attending: ORTHOPAEDIC SURGERY
Payer: COMMERCIAL

## 2019-04-11 DIAGNOSIS — Z96.652 STATUS POST TOTAL LEFT KNEE REPLACEMENT: Primary | ICD-10-CM

## 2019-04-11 PROBLEM — M25.562 LEFT KNEE PAIN: Status: ACTIVE | Noted: 2019-04-11

## 2019-04-11 PROCEDURE — 25000132 ZZH RX MED GY IP 250 OP 250 PS 637: Performed by: NURSE PRACTITIONER

## 2019-04-11 PROCEDURE — 40000306 ZZH STATISTIC PRE PROC ASSESS II: Performed by: ORTHOPAEDIC SURGERY

## 2019-04-11 PROCEDURE — 36000093 ZZH SURGERY LEVEL 4 1ST 30 MIN: Performed by: ORTHOPAEDIC SURGERY

## 2019-04-11 PROCEDURE — C1776 JOINT DEVICE (IMPLANTABLE): HCPCS | Performed by: ORTHOPAEDIC SURGERY

## 2019-04-11 PROCEDURE — 25000128 H RX IP 250 OP 636: Performed by: ORTHOPAEDIC SURGERY

## 2019-04-11 PROCEDURE — 73560 X-RAY EXAM OF KNEE 1 OR 2: CPT | Mod: TC,LT

## 2019-04-11 PROCEDURE — 37000009 ZZH ANESTHESIA TECHNICAL FEE, EACH ADDTL 15 MIN: Performed by: ORTHOPAEDIC SURGERY

## 2019-04-11 PROCEDURE — 27110028 ZZH OR GENERAL SUPPLY NON-STERILE: Performed by: ORTHOPAEDIC SURGERY

## 2019-04-11 PROCEDURE — 25000125 ZZHC RX 250: Performed by: ORTHOPAEDIC SURGERY

## 2019-04-11 PROCEDURE — 25800030 ZZH RX IP 258 OP 636: Performed by: ANESTHESIOLOGY

## 2019-04-11 PROCEDURE — 3E0T3BZ INTRODUCTION OF ANESTHETIC AGENT INTO PERIPHERAL NERVES AND PLEXI, PERCUTANEOUS APPROACH: ICD-10-PCS | Performed by: NURSE ANESTHETIST, CERTIFIED REGISTERED

## 2019-04-11 PROCEDURE — 25000128 H RX IP 250 OP 636: Performed by: ANESTHESIOLOGY

## 2019-04-11 PROCEDURE — 27810169 ZZH OR IMPLANT GENERAL: Performed by: ORTHOPAEDIC SURGERY

## 2019-04-11 PROCEDURE — 36000063 ZZH SURGERY LEVEL 4 EA 15 ADDTL MIN: Performed by: ORTHOPAEDIC SURGERY

## 2019-04-11 PROCEDURE — 71000015 ZZH RECOVERY PHASE 1 LEVEL 2 EA ADDTL HR: Performed by: ORTHOPAEDIC SURGERY

## 2019-04-11 PROCEDURE — 25000125 ZZHC RX 250: Performed by: ANESTHESIOLOGY

## 2019-04-11 PROCEDURE — 12000000 ZZH R&B MED SURG/OB

## 2019-04-11 PROCEDURE — 37000008 ZZH ANESTHESIA TECHNICAL FEE, 1ST 30 MIN: Performed by: ORTHOPAEDIC SURGERY

## 2019-04-11 PROCEDURE — 25000128 H RX IP 250 OP 636: Performed by: NURSE ANESTHETIST, CERTIFIED REGISTERED

## 2019-04-11 PROCEDURE — 40000275 ZZH STATISTIC RCP TIME EA 10 MIN

## 2019-04-11 PROCEDURE — 88300 SURGICAL PATH GROSS: CPT | Mod: TC | Performed by: ORTHOPAEDIC SURGERY

## 2019-04-11 PROCEDURE — 99232 SBSQ HOSP IP/OBS MODERATE 35: CPT | Performed by: NURSE PRACTITIONER

## 2019-04-11 PROCEDURE — 97161 PT EVAL LOW COMPLEX 20 MIN: CPT | Mod: GP

## 2019-04-11 PROCEDURE — 25000125 ZZHC RX 250: Performed by: NURSE ANESTHETIST, CERTIFIED REGISTERED

## 2019-04-11 PROCEDURE — 25000132 ZZH RX MED GY IP 250 OP 250 PS 637: Performed by: ORTHOPAEDIC SURGERY

## 2019-04-11 PROCEDURE — 27447 TOTAL KNEE ARTHROPLASTY: CPT | Performed by: ANESTHESIOLOGY

## 2019-04-11 PROCEDURE — 01999 UNLISTED ANES PROCEDURE: CPT | Performed by: NURSE ANESTHETIST, CERTIFIED REGISTERED

## 2019-04-11 PROCEDURE — 71000014 ZZH RECOVERY PHASE 1 LEVEL 2 FIRST HR: Performed by: ORTHOPAEDIC SURGERY

## 2019-04-11 PROCEDURE — 0SRD0J9 REPLACEMENT OF LEFT KNEE JOINT WITH SYNTHETIC SUBSTITUTE, CEMENTED, OPEN APPROACH: ICD-10-PCS | Performed by: ORTHOPAEDIC SURGERY

## 2019-04-11 PROCEDURE — 27210794 ZZH OR GENERAL SUPPLY STERILE: Performed by: ORTHOPAEDIC SURGERY

## 2019-04-11 PROCEDURE — C9290 INJ, BUPIVACAINE LIPOSOME: HCPCS | Performed by: ORTHOPAEDIC SURGERY

## 2019-04-11 PROCEDURE — 25800030 ZZH RX IP 258 OP 636: Performed by: ORTHOPAEDIC SURGERY

## 2019-04-11 DEVICE — BONE CEMENT-SIMPLEX W/TOBRAMYCIN: Type: IMPLANTABLE DEVICE | Site: KNEE | Status: FUNCTIONAL

## 2019-04-11 DEVICE — PATELLA-NEXGEN ALL POLY 35MM: Type: IMPLANTABLE DEVICE | Site: KNEE | Status: FUNCTIONAL

## 2019-04-11 DEVICE — IMPLANTABLE DEVICE: Type: IMPLANTABLE DEVICE | Site: KNEE | Status: FUNCTIONAL

## 2019-04-11 RX ORDER — ONDANSETRON 2 MG/ML
INJECTION INTRAMUSCULAR; INTRAVENOUS PRN
Status: DISCONTINUED | OUTPATIENT
Start: 2019-04-11 | End: 2019-04-11

## 2019-04-11 RX ORDER — PROPOFOL 10 MG/ML
INJECTION, EMULSION INTRAVENOUS PRN
Status: DISCONTINUED | OUTPATIENT
Start: 2019-04-11 | End: 2019-04-11

## 2019-04-11 RX ORDER — CEFAZOLIN SODIUM 2 G/100ML
2 INJECTION, SOLUTION INTRAVENOUS
Status: COMPLETED | OUTPATIENT
Start: 2019-04-11 | End: 2019-04-11

## 2019-04-11 RX ORDER — ONDANSETRON 4 MG/1
4 TABLET, ORALLY DISINTEGRATING ORAL EVERY 30 MIN PRN
Status: DISCONTINUED | OUTPATIENT
Start: 2019-04-11 | End: 2019-04-11 | Stop reason: HOSPADM

## 2019-04-11 RX ORDER — ONDANSETRON 2 MG/ML
4 INJECTION INTRAMUSCULAR; INTRAVENOUS EVERY 30 MIN PRN
Status: DISCONTINUED | OUTPATIENT
Start: 2019-04-11 | End: 2019-04-11 | Stop reason: HOSPADM

## 2019-04-11 RX ORDER — HYDROMORPHONE HYDROCHLORIDE 1 MG/ML
.3-.5 INJECTION, SOLUTION INTRAMUSCULAR; INTRAVENOUS; SUBCUTANEOUS EVERY 5 MIN PRN
Status: DISCONTINUED | OUTPATIENT
Start: 2019-04-11 | End: 2019-04-11 | Stop reason: HOSPADM

## 2019-04-11 RX ORDER — HYDROMORPHONE HYDROCHLORIDE 1 MG/ML
.3-.5 INJECTION, SOLUTION INTRAMUSCULAR; INTRAVENOUS; SUBCUTANEOUS
Status: DISCONTINUED | OUTPATIENT
Start: 2019-04-11 | End: 2019-04-13 | Stop reason: HOSPADM

## 2019-04-11 RX ORDER — DEXAMETHASONE SODIUM PHOSPHATE 4 MG/ML
4 INJECTION, SOLUTION INTRA-ARTICULAR; INTRALESIONAL; INTRAMUSCULAR; INTRAVENOUS; SOFT TISSUE
Status: DISCONTINUED | OUTPATIENT
Start: 2019-04-11 | End: 2019-04-11 | Stop reason: HOSPADM

## 2019-04-11 RX ORDER — CEFAZOLIN SODIUM 1 G/3ML
1 INJECTION, POWDER, FOR SOLUTION INTRAMUSCULAR; INTRAVENOUS SEE ADMIN INSTRUCTIONS
Status: DISCONTINUED | OUTPATIENT
Start: 2019-04-11 | End: 2019-04-11 | Stop reason: HOSPADM

## 2019-04-11 RX ORDER — MULTIPLE VITAMINS W/ MINERALS TAB 9MG-400MCG
1 TAB ORAL DAILY
Status: DISCONTINUED | OUTPATIENT
Start: 2019-04-11 | End: 2019-04-13 | Stop reason: HOSPADM

## 2019-04-11 RX ORDER — NALOXONE HYDROCHLORIDE 0.4 MG/ML
.1-.4 INJECTION, SOLUTION INTRAMUSCULAR; INTRAVENOUS; SUBCUTANEOUS
Status: ACTIVE | OUTPATIENT
Start: 2019-04-11 | End: 2019-04-12

## 2019-04-11 RX ORDER — AMOXICILLIN 250 MG
1 CAPSULE ORAL 2 TIMES DAILY
Status: DISCONTINUED | OUTPATIENT
Start: 2019-04-11 | End: 2019-04-13 | Stop reason: HOSPADM

## 2019-04-11 RX ORDER — KETOROLAC TROMETHAMINE 30 MG/ML
30 INJECTION, SOLUTION INTRAMUSCULAR; INTRAVENOUS EVERY 6 HOURS
Status: COMPLETED | OUTPATIENT
Start: 2019-04-11 | End: 2019-04-12

## 2019-04-11 RX ORDER — LIDOCAINE 40 MG/G
CREAM TOPICAL
Status: DISCONTINUED | OUTPATIENT
Start: 2019-04-11 | End: 2019-04-13 | Stop reason: HOSPADM

## 2019-04-11 RX ORDER — FENTANYL CITRATE 50 UG/ML
INJECTION, SOLUTION INTRAMUSCULAR; INTRAVENOUS PRN
Status: DISCONTINUED | OUTPATIENT
Start: 2019-04-11 | End: 2019-04-11

## 2019-04-11 RX ORDER — DEXAMETHASONE SODIUM PHOSPHATE 10 MG/ML
INJECTION, SOLUTION INTRAMUSCULAR; INTRAVENOUS PRN
Status: DISCONTINUED | OUTPATIENT
Start: 2019-04-11 | End: 2019-04-11

## 2019-04-11 RX ORDER — NALOXONE HYDROCHLORIDE 0.4 MG/ML
.1-.4 INJECTION, SOLUTION INTRAMUSCULAR; INTRAVENOUS; SUBCUTANEOUS
Status: DISCONTINUED | OUTPATIENT
Start: 2019-04-11 | End: 2019-04-11

## 2019-04-11 RX ORDER — PRAMIPEXOLE DIHYDROCHLORIDE 0.12 MG/1
0.5 TABLET ORAL AT BEDTIME
Status: DISCONTINUED | OUTPATIENT
Start: 2019-04-11 | End: 2019-04-13 | Stop reason: HOSPADM

## 2019-04-11 RX ORDER — METOCLOPRAMIDE 10 MG/1
10 TABLET ORAL EVERY 6 HOURS PRN
Status: DISCONTINUED | OUTPATIENT
Start: 2019-04-11 | End: 2019-04-13 | Stop reason: HOSPADM

## 2019-04-11 RX ORDER — SODIUM CHLORIDE, SODIUM LACTATE, POTASSIUM CHLORIDE, CALCIUM CHLORIDE 600; 310; 30; 20 MG/100ML; MG/100ML; MG/100ML; MG/100ML
INJECTION, SOLUTION INTRAVENOUS CONTINUOUS
Status: DISCONTINUED | OUTPATIENT
Start: 2019-04-11 | End: 2019-04-11 | Stop reason: HOSPADM

## 2019-04-11 RX ORDER — OXYCODONE HYDROCHLORIDE 5 MG/1
5-10 TABLET ORAL
Status: DISCONTINUED | OUTPATIENT
Start: 2019-04-11 | End: 2019-04-13 | Stop reason: HOSPADM

## 2019-04-11 RX ORDER — BUPIVACAINE HYDROCHLORIDE 7.5 MG/ML
INJECTION, SOLUTION INTRASPINAL PRN
Status: DISCONTINUED | OUTPATIENT
Start: 2019-04-11 | End: 2019-04-11

## 2019-04-11 RX ORDER — ROPIVACAINE HYDROCHLORIDE 5 MG/ML
INJECTION, SOLUTION EPIDURAL; INFILTRATION; PERINEURAL PRN
Status: DISCONTINUED | OUTPATIENT
Start: 2019-04-11 | End: 2019-04-11

## 2019-04-11 RX ORDER — CEFAZOLIN SODIUM 2 G/100ML
2 INJECTION, SOLUTION INTRAVENOUS EVERY 8 HOURS
Status: COMPLETED | OUTPATIENT
Start: 2019-04-11 | End: 2019-04-12

## 2019-04-11 RX ORDER — ONDANSETRON 2 MG/ML
4 INJECTION INTRAMUSCULAR; INTRAVENOUS EVERY 6 HOURS PRN
Status: DISCONTINUED | OUTPATIENT
Start: 2019-04-11 | End: 2019-04-13 | Stop reason: HOSPADM

## 2019-04-11 RX ORDER — KETOROLAC TROMETHAMINE 30 MG/ML
30 INJECTION, SOLUTION INTRAMUSCULAR; INTRAVENOUS
Status: DISCONTINUED | OUTPATIENT
Start: 2019-04-11 | End: 2019-04-11 | Stop reason: HOSPADM

## 2019-04-11 RX ORDER — METHOCARBAMOL 750 MG/1
750 TABLET, FILM COATED ORAL EVERY 6 HOURS PRN
Status: DISCONTINUED | OUTPATIENT
Start: 2019-04-11 | End: 2019-04-13 | Stop reason: HOSPADM

## 2019-04-11 RX ORDER — ACETAMINOPHEN 325 MG/1
975 TABLET ORAL EVERY 8 HOURS
Status: DISCONTINUED | OUTPATIENT
Start: 2019-04-11 | End: 2019-04-13 | Stop reason: HOSPADM

## 2019-04-11 RX ORDER — PROCHLORPERAZINE MALEATE 10 MG
10 TABLET ORAL EVERY 6 HOURS PRN
Status: DISCONTINUED | OUTPATIENT
Start: 2019-04-11 | End: 2019-04-13 | Stop reason: HOSPADM

## 2019-04-11 RX ORDER — OXYCODONE HYDROCHLORIDE 5 MG/1
5 TABLET ORAL EVERY 4 HOURS PRN
Status: DISCONTINUED | OUTPATIENT
Start: 2019-04-11 | End: 2019-04-11

## 2019-04-11 RX ORDER — METOPROLOL TARTRATE 1 MG/ML
1-2 INJECTION, SOLUTION INTRAVENOUS EVERY 5 MIN PRN
Status: DISCONTINUED | OUTPATIENT
Start: 2019-04-11 | End: 2019-04-11 | Stop reason: HOSPADM

## 2019-04-11 RX ORDER — PROPOFOL 10 MG/ML
INJECTION, EMULSION INTRAVENOUS CONTINUOUS PRN
Status: DISCONTINUED | OUTPATIENT
Start: 2019-04-11 | End: 2019-04-11

## 2019-04-11 RX ORDER — AMOXICILLIN 250 MG
2 CAPSULE ORAL 2 TIMES DAILY
Status: DISCONTINUED | OUTPATIENT
Start: 2019-04-11 | End: 2019-04-13 | Stop reason: HOSPADM

## 2019-04-11 RX ORDER — ZOLPIDEM TARTRATE 5 MG/1
5-10 TABLET ORAL
Status: DISCONTINUED | OUTPATIENT
Start: 2019-04-11 | End: 2019-04-13 | Stop reason: HOSPADM

## 2019-04-11 RX ORDER — OXYBUTYNIN CHLORIDE 5 MG/1
5 TABLET ORAL 2 TIMES DAILY
Status: DISCONTINUED | OUTPATIENT
Start: 2019-04-11 | End: 2019-04-13 | Stop reason: HOSPADM

## 2019-04-11 RX ORDER — SODIUM CHLORIDE, SODIUM LACTATE, POTASSIUM CHLORIDE, CALCIUM CHLORIDE 600; 310; 30; 20 MG/100ML; MG/100ML; MG/100ML; MG/100ML
INJECTION, SOLUTION INTRAVENOUS CONTINUOUS
Status: DISCONTINUED | OUTPATIENT
Start: 2019-04-11 | End: 2019-04-13 | Stop reason: HOSPADM

## 2019-04-11 RX ORDER — SCOLOPAMINE TRANSDERMAL SYSTEM 1 MG/1
1 PATCH, EXTENDED RELEASE TRANSDERMAL ONCE
Status: COMPLETED | OUTPATIENT
Start: 2019-04-11 | End: 2019-04-11

## 2019-04-11 RX ORDER — METOCLOPRAMIDE HYDROCHLORIDE 5 MG/ML
10 INJECTION INTRAMUSCULAR; INTRAVENOUS EVERY 6 HOURS PRN
Status: DISCONTINUED | OUTPATIENT
Start: 2019-04-11 | End: 2019-04-13 | Stop reason: HOSPADM

## 2019-04-11 RX ORDER — LISINOPRIL 20 MG/1
20 TABLET ORAL DAILY
Status: DISCONTINUED | OUTPATIENT
Start: 2019-04-11 | End: 2019-04-13 | Stop reason: HOSPADM

## 2019-04-11 RX ORDER — FENTANYL CITRATE 50 UG/ML
25-50 INJECTION, SOLUTION INTRAMUSCULAR; INTRAVENOUS
Status: DISCONTINUED | OUTPATIENT
Start: 2019-04-11 | End: 2019-04-11 | Stop reason: HOSPADM

## 2019-04-11 RX ORDER — MEPERIDINE HYDROCHLORIDE 50 MG/ML
12.5 INJECTION INTRAMUSCULAR; INTRAVENOUS; SUBCUTANEOUS EVERY 5 MIN PRN
Status: DISCONTINUED | OUTPATIENT
Start: 2019-04-11 | End: 2019-04-11 | Stop reason: HOSPADM

## 2019-04-11 RX ORDER — ONDANSETRON 4 MG/1
4 TABLET, ORALLY DISINTEGRATING ORAL EVERY 6 HOURS PRN
Status: DISCONTINUED | OUTPATIENT
Start: 2019-04-11 | End: 2019-04-13 | Stop reason: HOSPADM

## 2019-04-11 RX ORDER — ALBUTEROL SULFATE 0.83 MG/ML
2.5 SOLUTION RESPIRATORY (INHALATION) EVERY 4 HOURS PRN
Status: DISCONTINUED | OUTPATIENT
Start: 2019-04-11 | End: 2019-04-11 | Stop reason: HOSPADM

## 2019-04-11 RX ORDER — HYDRALAZINE HYDROCHLORIDE 20 MG/ML
2.5-5 INJECTION INTRAMUSCULAR; INTRAVENOUS EVERY 10 MIN PRN
Status: DISCONTINUED | OUTPATIENT
Start: 2019-04-11 | End: 2019-04-11 | Stop reason: HOSPADM

## 2019-04-11 RX ORDER — ACETAMINOPHEN 325 MG/1
650 TABLET ORAL EVERY 4 HOURS PRN
Status: DISCONTINUED | OUTPATIENT
Start: 2019-04-14 | End: 2019-04-13 | Stop reason: HOSPADM

## 2019-04-11 RX ADMIN — FENTANYL CITRATE 25 MCG: 50 INJECTION, SOLUTION INTRAMUSCULAR; INTRAVENOUS at 07:41

## 2019-04-11 RX ADMIN — LISINOPRIL 20 MG: 20 TABLET ORAL at 11:42

## 2019-04-11 RX ADMIN — DEXAMETHASONE SODIUM PHOSPHATE 6 MG: 10 INJECTION, SOLUTION INTRAMUSCULAR; INTRAVENOUS at 08:00

## 2019-04-11 RX ADMIN — PRAMIPEXOLE DIHYDROCHLORIDE 0.5 MG: 0.12 TABLET ORAL at 21:05

## 2019-04-11 RX ADMIN — ASPIRIN 325 MG: 325 TABLET, COATED ORAL at 12:28

## 2019-04-11 RX ADMIN — FENTANYL CITRATE 50 MCG: 50 INJECTION, SOLUTION INTRAMUSCULAR; INTRAVENOUS at 09:57

## 2019-04-11 RX ADMIN — SCOPALAMINE 1 PATCH: 1 PATCH, EXTENDED RELEASE TRANSDERMAL at 06:54

## 2019-04-11 RX ADMIN — PROPOFOL 50 MG: 10 INJECTION, EMULSION INTRAVENOUS at 07:44

## 2019-04-11 RX ADMIN — CEFAZOLIN SODIUM 2 G: 2 INJECTION, SOLUTION INTRAVENOUS at 23:56

## 2019-04-11 RX ADMIN — OXYCODONE HYDROCHLORIDE 5 MG: 5 TABLET ORAL at 21:05

## 2019-04-11 RX ADMIN — MULTIPLE VITAMINS W/ MINERALS TAB 1 TABLET: TAB at 11:42

## 2019-04-11 RX ADMIN — OXYBUTYNIN CHLORIDE 5 MG: 5 TABLET ORAL at 21:05

## 2019-04-11 RX ADMIN — OMEPRAZOLE 20 MG: 20 CAPSULE, DELAYED RELEASE ORAL at 17:09

## 2019-04-11 RX ADMIN — KETOROLAC TROMETHAMINE 30 MG: 30 INJECTION INTRAMUSCULAR; INTRAVENOUS at 11:43

## 2019-04-11 RX ADMIN — ROPIVACAINE HYDROCHLORIDE 20 ML: 5 INJECTION, SOLUTION EPIDURAL; INFILTRATION; PERINEURAL at 07:24

## 2019-04-11 RX ADMIN — SENNOSIDES AND DOCUSATE SODIUM 1 TABLET: 8.6; 5 TABLET ORAL at 12:29

## 2019-04-11 RX ADMIN — TRANEXAMIC ACID 1 G: 100 INJECTION, SOLUTION INTRAVENOUS at 07:55

## 2019-04-11 RX ADMIN — OXYCODONE HYDROCHLORIDE 5 MG: 5 TABLET ORAL at 17:17

## 2019-04-11 RX ADMIN — PROPOFOL 60 MCG/KG/MIN: 10 INJECTION, EMULSION INTRAVENOUS at 07:45

## 2019-04-11 RX ADMIN — MIDAZOLAM 2 MG: 1 INJECTION INTRAMUSCULAR; INTRAVENOUS at 07:33

## 2019-04-11 RX ADMIN — SODIUM CHLORIDE, POTASSIUM CHLORIDE, SODIUM LACTATE AND CALCIUM CHLORIDE: 600; 310; 30; 20 INJECTION, SOLUTION INTRAVENOUS at 08:37

## 2019-04-11 RX ADMIN — KETOROLAC TROMETHAMINE 30 MG: 30 INJECTION INTRAMUSCULAR; INTRAVENOUS at 23:48

## 2019-04-11 RX ADMIN — SENNOSIDES AND DOCUSATE SODIUM 2 TABLET: 8.6; 5 TABLET ORAL at 21:05

## 2019-04-11 RX ADMIN — OXYCODONE HYDROCHLORIDE 5 MG: 5 TABLET ORAL at 14:19

## 2019-04-11 RX ADMIN — CEFAZOLIN SODIUM 2 G: 2 INJECTION, SOLUTION INTRAVENOUS at 07:45

## 2019-04-11 RX ADMIN — CEFAZOLIN SODIUM 2 G: 2 INJECTION, SOLUTION INTRAVENOUS at 15:43

## 2019-04-11 RX ADMIN — METOPROLOL TARTRATE 2 MG: 1 INJECTION, SOLUTION INTRAVENOUS at 10:08

## 2019-04-11 RX ADMIN — ONDANSETRON 4 MG: 2 INJECTION INTRAMUSCULAR; INTRAVENOUS at 07:59

## 2019-04-11 RX ADMIN — ACETAMINOPHEN 975 MG: 325 TABLET, FILM COATED ORAL at 19:28

## 2019-04-11 RX ADMIN — ACETAMINOPHEN 975 MG: 325 TABLET, FILM COATED ORAL at 11:42

## 2019-04-11 RX ADMIN — TRANEXAMIC ACID 1 G: 100 INJECTION, SOLUTION INTRAVENOUS at 09:04

## 2019-04-11 RX ADMIN — BUPIVACAINE HYDROCHLORIDE IN DEXTROSE 2 ML: 7.5 INJECTION, SOLUTION SUBARACHNOID at 07:41

## 2019-04-11 RX ADMIN — HYDRALAZINE HYDROCHLORIDE 5 MG: 20 INJECTION INTRAMUSCULAR; INTRAVENOUS at 10:26

## 2019-04-11 RX ADMIN — FENTANYL CITRATE 25 MCG: 50 INJECTION, SOLUTION INTRAMUSCULAR; INTRAVENOUS at 09:49

## 2019-04-11 RX ADMIN — SODIUM CHLORIDE, POTASSIUM CHLORIDE, SODIUM LACTATE AND CALCIUM CHLORIDE: 600; 310; 30; 20 INJECTION, SOLUTION INTRAVENOUS at 07:11

## 2019-04-11 RX ADMIN — KETOROLAC TROMETHAMINE 30 MG: 30 INJECTION INTRAMUSCULAR; INTRAVENOUS at 17:09

## 2019-04-11 ASSESSMENT — MIFFLIN-ST. JEOR
SCORE: 1860.4
SCORE: 1888.13

## 2019-04-11 ASSESSMENT — ACTIVITIES OF DAILY LIVING (ADL)
ADLS_ACUITY_SCORE: 16
ADLS_ACUITY_SCORE: 11
ADLS_ACUITY_SCORE: 12

## 2019-04-11 NOTE — CONSULTS
[facility]    Hospitalist Progress Note    Date of Service (when I saw the patient): 04/11/2019    Assessment & Plan        S/P left TKA: Per surgery. No immediate post-operative complications. Pain is under good control so far.       Essential hypertension, benign: Running high post-opertively which has been noted after other surgeries. He did not take his lisinopril pre-op, will give now and would expect improvement quickly.       GERD (gastroesophageal reflux disease): As he will be on daily ASA x4 weeks, will give daily PPI until complete.       Overactive bladder: Continue home dose ditropan, will need to watch for post-operative retention.     DVT Prophylaxis: Defer to primary service-ASA  Code Status: Full Code    Disposition: Expected discharge in 1-3 days once cleared by orthopedics/PT.    America Cruz CNP    Subjective:  Denies chest pain, dyspnea, abdominal pain or nausea. He denies any pain to the left leg, he is already able to do straight leg raises.    -Data reviewed today: I reviewed all new labs and imaging results over the last 24 hours.    Physical Exam   Temp: 96  F (35.6  C) Temp src: Tympanic BP: 161/97   Heart Rate: 57 Resp: 14 SpO2: 96 % O2 Device: None (Room air) Oxygen Delivery: 2 LPM  Vitals:    04/11/19 0644 04/11/19 1052   Weight: 104.3 kg (230 lb) 107.1 kg (236 lb 1.8 oz)     Vital Signs with Ranges  Temp:  [96  F (35.6  C)-97  F (36.1  C)] 96  F (35.6  C)  Heart Rate:  [45-97] 57  Resp:  [13-16] 14  BP: (136-189)/() 161/97  SpO2:  [94 %-100 %] 96 %  No intake/output data recorded.    Peripheral IV 08/30/16 Right Hand (Active)   Site Assessment WDL 4/11/2019 11:06 AM   Line Status Infusing 4/11/2019 11:06 AM   Phlebitis Scale 0-->no symptoms 4/11/2019 11:06 AM   Infiltration Scale 0 4/11/2019 11:06 AM   Number of days: 954       Incision/Surgical Site 04/11/19 Left Knee (Active)   Incision Assessment UTV 4/11/2019 10:30 AM   Closure Liquid bandage 4/11/2019   9:33 AM   Incision Drainage Amount None 4/11/2019 10:30 AM   Dressing Intervention Clean, dry, intact 4/11/2019 10:30 AM   Number of days: 0     Line/device assessment(s) completed for medical necessity    Constitutional: Awake,alert, no acute distress  Respiratory: Clear bilaterally without wheezes, rhonchi or crackles.   Cardiovascular: HRR, no murmurs, rubs, thrills.  GI: Soft,nontender, bowel sounds positive but hypoactive  Skin/Integumen: No rashes, bruising or open areas.   Other:  Left leg distal CMS intact, ACE wrap, bandage and ice in place.     Medications     lactated ringers Stopped (04/11/19 1134)       acetaminophen  975 mg Oral Q8H     aspirin  325 mg Oral Daily     ceFAZolin  2 g Intravenous Q8H     ketorolac  30 mg Intravenous Q6H     lisinopril  20 mg Oral Daily     multivitamin w/minerals  1 tablet Oral Daily     omeprazole  20 mg Oral BID AC     oxybutynin  5 mg Oral BID     pramipexole  0.5 mg Oral At Bedtime     scopolamine   Transdermal Q8H     [START ON 4/12/2019] scopolamine   Transdermal Once     senna-docusate  1 tablet Oral BID    Or     senna-docusate  2 tablet Oral BID     sodium chloride (PF)  3 mL Intracatheter Q8H       Data   No lab results found in last 7 days.    Recent Results (from the past 24 hour(s))   XR Knee Port Left 1/2 Views    Narrative    PROCEDURE:  XR KNEE PORT LT 1/2 VW    HISTORY: postop TKR    COMPARISON:  None.    TECHNIQUE:  2 views of the left knee were obtained.    FINDINGS:  There is a knee prosthesis in place. Gas is seen within the  joint postoperatively. The distal femur and the proximal tibia and  fibula and patella appear intact.       Impression    IMPRESSION: Knee prosthesis in place      CARTER CURRY MD

## 2019-04-11 NOTE — PLAN OF CARE
VSS except BP elevated 178//92, Lisinopril given, rates pain 5/10 given Gita per MAR. LS CTA bilat. BS hypoactive. Ambulates Assist x2 w/FWW & GB, up in chair w/PT. Unable to maintain bladder control w/nerve block, brief in place. Tolerating PO diet, absent of N/V. IV SL'd flushing w/o difficulty. Call light w/in reach, making needs known, family in rm.     Face to face report given with opportunity to observe patient.    Report given to Leydi MOHAN RN.    Micheline Hopkins   4/11/2019  3:31 PM

## 2019-04-11 NOTE — ANESTHESIA PROCEDURE NOTES
Peripheral nerve/Neuraxial procedure note : intrathecal  Pre-Procedure    Location: OR    Procedure Times:4/11/2019 7:36 AM and 4/11/2019 7:41 AM  Pre-Anesthestic Checklist: patient identified, IV checked, site marked, risks and benefits discussed, informed consent, monitors and equipment checked, pre-op evaluation, at physician/surgeon's request and post-op pain management    Timeout  Correct Patient: Yes   Correct Procedure: Yes   Correct Site: Yes   Correct Laterality: Yes   Correct Position: Yes   Site Marked: Yes   .   Procedure Documentation    .    Procedure:    Intrathecal.  Insertion Site:L4-5  (midline approach)      Patient Prep;mask, sterile gloves, chlorhexidine gluconate and isopropyl alcohol, patient draped.  .  Needle: Yesi tip Spinal Needle (gauge): 22  Spinal/LP Needle Length (inches): 3.5 # of attempts: 1 and  # of redirects:  2 No introducer used .       Assessment/Narrative  Paresthesias: No.  .  .  clear CSF fluid removed . Time Injected: 07:41

## 2019-04-11 NOTE — BRIEF OP NOTE
[facility]    Brief Operative Note    Pre-operative diagnosis: LEFT KNEE PAIN  Post-operative diagnosis * No post-op diagnosis entered *  Procedure: Procedure(s):  LEFT TOTAL KNEE ARTHROPLASTY/PERSONA  Surgeon: Surgeon(s) and Role:     * Lamonte Cunha MD - Primary  Anesthesia: General   Estimated blood loss: Less than 50 ml  Drains: None  Specimens: * No specimens in log *  Findings:   None.  Complications: None.  Implants: Materials Involved:  Metalic  Grafts:  None.

## 2019-04-11 NOTE — PLAN OF CARE
[facility] Inpatient Admission Note:    Patient admitted to 3104/3104-1 at approximately 1050 via bed accompanied by nurse from surgery . Report received from Bhavana MANCILLA in SBAR format at 1050 via face to face in room. Patient remained in bed. Patient is alert and oriented X 3, denies pain; rates at 0 on 0-10 scale.  Patient oriented to room, unit, hourly rounding, and plan of care. Explained admission packet and patient handbook with patient bill of rights brochure. Will continue to monitor and document as needed.     Inpatient Nursing criteria listed below was met:    Health care directives status obtained and documented: Yes    Care Everywhere authorization obtained n/a    MRSA swab completed for patient 65 years and older: N/A    Patient identifies a surrogate decision maker: Yes If yes, who:Haylie Con (wife) Contact Information:[phone]    Core Measure diagnosis present:Yes - If yes, state diagnosis: SCIP.      If initial lactic acid >2.0, repeat lactic acid drawn within one hour of arrival to unit: NA. If no, state reason: n/a    Vaccination assessment and education completed: Yes   Vaccinations received prior to admission: Pneumovax yes  Influenza(seasonal)  YES   Vaccination(s) ordered: not given today because pt current    Clergy visit ordered if patient requests: yes    Skin issues/needs documented: Yes    Isolation Patient: no Education given, correct sign in place and documentation row added to PCS:  Yes    Fall Prevention Yes: Care plan updated, education given and documented, sticker and magnet in place: Yes    Care Plan initiated: Yes    Education Documented (including assessment): Yes    Patient has discharge needs : No If yes, please explain:n/a

## 2019-04-11 NOTE — OR NURSING
Pt doing well now. Pt did have increasing BPs as his spinal wore off. Pt denied pain, but did state he was getting some feeling back in the surgical area. Did medicate with 25mcg IV Fentanyl, and then 50mcg IV Fentanyl 9 min later to see if that would help bring his BP down. BP remained high, so metoprolol 2mg IV given. Pt HR had been in the 58-62 range since admission to PACU phase. Approximately 3 min after metoprolol IV givenpt HR jumped in the low 100s. It remained there for about 45 sec. It then dropped down to 40-42. It came up slowly to the high 50s range again. BP remained high with a max reading of 181/111. Pt usually takes 20mg oral Lisinopril daily, but did not take it this AM due to surgery. Discussed what occurred with wife, Haylie, and she states something similar with his HR and BP occurred after his last surgery. Hydralaxzine 5mg IV given, and BP did come down some to the 160s-170s/90s-100s. Otherwise, Pt very stable. Denies pain. Already drinking water in PACU, and tolerating well. Pt transferred to room 3104. Connected Pt to monitor, and first set of VS obtained. REport given to Micheline MANCILLA. Did recommend getting oral BP medication on board as soon as possible. Spinal mostly worn off. Pt unable to move toes, but rotating feet at the ankles. Julio 20.

## 2019-04-11 NOTE — PROGRESS NOTES
Inpatient Physical Therapy Evaluation    Name: Ramy Guillermo MRN# [id number]   Age: 64 year old YOB: 1955     Date of Consultation: 2019  Consultation is requested by:  Dr. Cunha   Specific Consultation Request:  Post-op Total Knee   Primary care provider: Mary Wooten    General Information:   Onset Date: 19    History of Current Problem/Admission: Left knee pain [M25.562]    Prior Level of Function: Pt is independent with all mobility at baseline without assist device.   Ambulation: 0 - Independent   Transferrin - Independent   Toiletin - Independent    Bathin - Independent   Dressin - Independent   Eatin - Independent   Communication: 0 - Understands/communicates without difficulty  Swallowin - swallows foods/liquids without difficulty  Cognition: 0 - no cognitive issues reported    Fall history within the last 6 months: No    Current Living Situation: Patient has 0 stairs to enter the home.    Current Equipment Used at Home: None, has walker, high rise toilet seat, grab bars, reacher stick, and a walk in shower    Patient & Family Goals: Return home    Past Medical History:   Past Medical History:   Diagnosis Date     Allergic rhinitis      Certain adverse effects, not elsewhere classified, other     required more sedation preop R vein ablation     Colon adenoma 8/3/2012    colonoscopy due 2017      DJD (degenerative joint disease) of lumbar spine     L4-L5 & L5-S1     Duodenal ulcer, unspecified as acute or chronic, without hemorrhage, perforation, or obstruction      Essential hypertension, benign      GERD (gastroesophageal reflux disease)      PONV (postoperative nausea and vomiting)        Past Surgical History:  Past Surgical History:   Procedure Laterality Date     ABLATE VEIN VARICOSE RADIO FREQUENCY WITH PHLEBECTOMY MULTIPLE STAB  10/7/11, 11    RT, LT Dr. Cruz Sung     ABLATE VEIN VARICOSE RADIO FREQUENCY WITH PHLEBECTOMY  MULTIPLE STAB  10/7/2011    Procedure:ABLATE VEIN VARICOSE RADIO FREQUENCY WITH PHLEBECTOMY MULTIPLE STAB; Right Radio Frenquency with Greater Saphenous with  Phlebectomy Multiple Stab; Surgeon:MURPHY ROBERT; Location:[facility] OR     ABLATE VEIN VARICOSE RADIO FREQUENCY WITH PHLEBECTOMY MULTIPLE STAB  11/4/2011    Procedure:ABLATE VEIN VARICOSE RADIO FREQUENCY WITH PHLEBECTOMY MULTIPLE STAB; Left Radio Frenquency with Greater Saphenous Vein with  Bilateral Phlebectomy Multiple Stab Varicose Veins.; Surgeon:MURPHY ROBERT; Location:[facility] OR     ABLATE VEIN VARICOSE RADIO FREQUENCY WITH PHLEBECTOMY MULTIPLE STAB  11/28/2012    Procedure: ABLATE VEIN VARICOSE RADIO FREQUENCY WITH PHLEBECTOMY MULTIPLE STAB;   Bilateral stab Micro- phlebectomies;  Surgeon: Dany Mascorro MD;  Location: [facility] OR     ARTHROSCOPY KNEE  3/1/2013    Procedure: ARTHROSCOPY KNEE;  Right knee arthoscopy w/ medial menisectomy and chondroplasty;  Surgeon: Abiodun Jacobson MD;  Location: [facility] OR     C EXCIS BOWEL LESION(S),SINGLE ENTEROTOMY  1968    small intestine     C RECONSTR TOTAL SHOULDER IMPLANT Right 10/24/2017     COLONOSCOPY N/A 8/30/2016    Procedure: COLONOSCOPY;  Surgeon: Calos Post MD;  Location: [facility] OR     COLONOSCOPY N/A 12/8/2017    Procedure: COLONOSCOPY;  COLONOSCOPY;  Surgeon: Calos Post MD;  Location: [facility] OR     FLEXIBLE SIGMOIDOSCOPY  1/9/07     HC COLONOSCOPY W BIOPSY  8/3/12    proximal ascending, tubular adenoma     HC EXCISION OF UVULA  2001    for snoring     HC KNEE SCOPE,MED/LAT MENISECTOMY  7/11/05    RT + chondroplasty of medial femoral condyl & patellofemoral joint, Dr. Verma     HC PARTIAL REMOVAL/REPAIR,ACROMION  12/20/13    Left, Sachin     HC REPAIR BICEPS LONG TENDON  12/20/13    Left     HERNIA REPAIR, INGUINAL RT/LT  01/10/08    LT w/onlay mesh patch, Dr. Mchugh     JOINT REPLACEMENT, HIP RT/LT  9/06    RT hip RESURFACING arthroplasty, Dr. Ra Valdivia @[facility]       Medications:   Current  Facility-Administered Medications   Medication     [START ON 4/14/2019] acetaminophen (TYLENOL) tablet 650 mg     acetaminophen (TYLENOL) tablet 975 mg     aspirin (ASA) EC tablet 325 mg     benzocaine-menthol (CEPACOL) 15-3.6 MG lozenge 1-2 lozenge     ceFAZolin (ANCEF) intermittent infusion 2 g in 100 mL dextrose PRE-MIX     HYDROmorphone (PF) (DILAUDID) injection 0.3-0.5 mg     ketorolac (TORADOL) injection 30 mg     lactated ringers infusion     lidocaine (LMX4) kit     lidocaine 1 % 0.1-1 mL     lisinopril (PRINIVIL/ZESTRIL) tablet 20 mg     methocarbamol (ROBAXIN) tablet 750 mg     metoclopramide (REGLAN) tablet 10 mg    Or     metoclopramide (REGLAN) injection 10 mg     multivitamin w/minerals (THERA-VIT-M) tablet 1 tablet     naloxone (NARCAN) injection 0.1-0.4 mg     omeprazole (priLOSEC) CR capsule 20 mg     ondansetron (ZOFRAN-ODT) ODT tab 4 mg    Or     ondansetron (ZOFRAN) injection 4 mg     oxybutynin (DITROPAN) tablet 5 mg     oxyCODONE (ROXICODONE) tablet 5-10 mg     pramipexole (MIRAPEX) tablet 0.5 mg     prochlorperazine (COMPAZINE) injection 10 mg    Or     prochlorperazine (COMPAZINE) tablet 10 mg     scopolamine (TRANSDERM-SCOP) Patch in Place     [START ON 4/12/2019] scopolamine (TRANSDERM-SCOP) patch REMOVAL     senna-docusate (SENOKOT-S/PERICOLACE) 8.6-50 MG per tablet 1 tablet    Or     senna-docusate (SENOKOT-S/PERICOLACE) 8.6-50 MG per tablet 2 tablet     sodium chloride (PF) 0.9% PF flush 3 mL     sodium chloride (PF) 0.9% PF flush 3 mL     zolpidem (AMBIEN) tablet 5-10 mg       Weight Bearing Status: WBAT     Cognitive Status Examination:  Orientation: awake and alert  Level of Consciousness: alert  Follows Commands and Answers Questions: 100% of the time  Personal Safety and Judgement: Intact  Memory: Immediate recall intact  Comments:     Pain:   Currently in pain? yes  Pain Location? L knee  Pain Rating: Mild     Physical Therapy Evaluation:   Integumentary/Edema: No issues observed    ROM: L knee AROM 5-90 degrees  Strength: Performs SLR independently , good quad set  Bed Mobility: Independent  Transfers: Supervision with FWW  Gait: Pt ambulated about 10ft from bed to chair with CGA using FWW. Pt was incontinent of urine during transfer  Stairs: NA  Balance: Good  Sensory: No issues observed  Coordination: Normal    Physical Therapy Interventions: Bed Mobility, Gait Training , ROM, Strengthening, Stretching , Transfer Training and Progressive Activity/Exercise     Clinical Impressions:  Criteria for Skilled Therapeutic Intervention Met: Yes, treatment indicated  PT Diagnosis: s/p L TKA  Influenced by the following impairments: Impaired gait, pain, weakness, L knee pain  Functional limitations due to impairment: Decreased tolerance for functional mobility tasks  Clinical presentation: Stable/Uncomplicated  Clinical presentation rationale: Clinical judgement  Clinical Decision making (complexity): Low Complexity  Frequency: 2 times/day   Predicted Duration of Therapy Intervention (days/wks): 5 days    Anticipated Discharge Disposition: Home with Home Therapy and Home with Outpatient Therapy   Anticipated Equipment Needs at Discharge: None  Risks and Benefits of therapy have been explained: Yes  Patient, Family & other staff in agreement with plan of care: Yes  Clinical Impression Comments: Pt POD 0 s/p L TKA doing well. Tolerance for treatment today limited by episode of incontinence. Plans to discharge home with wife and home PT or outpatient PT. Plan to treat pt 2x/day during his stay.     Total Eval Time: 15

## 2019-04-11 NOTE — INTERVAL H&P NOTE
Brief History of Illness:   Ramy Guillermo is a 64 year old male who was admitted for left knee pain and scheduled for left TKR    H&P reviewed and patient examined with no new updates from prior exam

## 2019-04-11 NOTE — PROGRESS NOTES
Subjective: The patient is POD #0 s/p L Total Knee Arthroplasty.  The patients pain is controlled fairly well.  They deny shortness of breath, chest pain, nausea or vomiting.  There have been no acute perioperative complications    Objective:   B/P: 161/97, Temp: 96, Pulse: Data Unavailable, Resp: 14    Alert and Orientated x3; No acute distress; Appears comfortable    Knee Exam: dressing/wound is clean, dry, intact without significant drainage.  No erythema or signs of infection.     No evidence of DVT    Distal motor/sensory exam is intact in the superficial peroneal, deep peroneal and tibial nerve distributions.      Normal capillary refill with a palpable dorsalis pedis pulse.    Hemoglobin   Date Value Ref Range Status   09/19/2017 13.0 (L) 13.3 - 17.7 g/dL Final       Assessment/Plan:     1) POD # 1 S/P L Total Knee Arthroplasty  -PT/OT--ROM and Gait training  -DVT prophylaxis.. mg po bid times 4 weeks  -Dispo--To home when cleared medically in about 2 days  -Hospitalist co-management  Follow-up 2 weeks OA Rhiannon

## 2019-04-11 NOTE — ANESTHESIA CARE TRANSFER NOTE
Patient: Ramy Guillermo    Procedure(s):  LEFT TOTAL KNEE ARTHROPLASTY/PERSONA    Diagnosis: LEFT KNEE PAIN  Diagnosis Additional Information: No value filed.    Anesthesia Type:   Spinal, Periph. Nerve Block for postop pain     Note:  Airway :Nasal Cannula  Patient transferred to:PACU  Handoff Report: Identifed the Patient, Identified the Reponsible Provider, Reviewed the pertinent medical history, Discussed the surgical course, Reviewed Intra-OP anesthesia mangement and issues during anesthesia, Set expectations for post-procedure period and Allowed opportunity for questions and acknowledgement of understanding      Vitals: (Last set prior to Anesthesia Care Transfer)    CRNA VITALS  4/11/2019 0840 - 4/11/2019 0919      4/11/2019             Resp Rate (set):  8                Electronically Signed By: ABRAHAM Diaz CRNA  April 11, 2019  9:19 AM

## 2019-04-11 NOTE — OP NOTE
Procedure Date: 04/11/2019      PREOPERATIVE DIAGNOSIS:  Left knee degenerative arthritis.      POSTOPERATIVE DIAGNOSIS:  Left knee degenerative arthritis.      PROCEDURE:  Left total knee replacement, Sylwia Persona CR.      SURGEON:  Lamonte Cunha MD       ASSISTANT:  Stalin Herrera PA-C      ANESTHESIA:  Spinal with nerve block.      COMPLICATIONS:  Without.      ESTIMATED BLOOD LOSS:  Less than 50 mL.      IMPLANTS:   1.  Sylwia Persona CR size 10 femoral component, size F tibial component, 12 CR poly and 35 patella.      INDICATIONS:  Mr. Guillermo is a 64-year-old gentleman with a history of left knee pain, progressive in nature, unresponsive to conservative measures.  Recommendation was for total knee replacement.  The patient did elect to proceed following a thorough discussion of the risks and benefits.      OPERATIVE PROCEDURE:  The patient was taken to the operative suite and administered spinal.  Following spinal and sedation, the left lower extremity was prepped and draped in the normal sterile fashion.  Preoperative antibiotics were administered and a timeout was called.  At this point in time, I made a midline incision just superior to the superior pole of the patella down to the tibial tubercle.  Dissection was carried down to the extensor mechanism.  A medial parapatellar approach was then performed.  Following the proper approach into the knee itself, the entry start point in the femur was identified and the drill was used to gain access.  We set our distal valgus cut at 5 degrees and performed our distal valgus cut.  After the distal valgus cut was performed, we then sized the patient up for a size 10 and pinned this in 3 degrees of external rotation, applied the foreign cut block to the distal cut surface and performed the anterior cut, posterior cut and chamfer cuts at this point in time.  Following femoral preparation, we used the extramedullary guide for the tibial resection, positioned in proper  varus and valgus, as well as anterior and posterior slope.  After this was established, we resected roughly 2 mm off the medial aspect and performed our tibia resection.  Upon completion of resection, we checked our flexion/extension gap balance.  Good balance was seen with a 10 mm block in both flexion and extension.  Such, we sized the tibia for a size F centered over the medial third tubercle, secured this down, used the drill followed by the keel punch.  We then medialized our femoral component, secured this down to our lug nuts.  Before that, we decided to go with a size standard for the femoral component.  We trialed both 11 and 12 CR poly.  The 12 showed good flexion/extension and varus and valgus plane stability, as well as posterior support.  We then resurfaced our patella and drilled for a 35 mm component.  All trial components were removed.  A final meniscal resection was carried out.  Exparel injected the posterior capsule, mediolateral gutters, as well as anteriorly.  We mixed the cement with tobramycin, applied the cement to the tibia, followed by implantation of the tibial component, then implantation of the polyethylene liner and cement application for the femur implantation of the femoral component and implantation of cement behind the kneecap and implantation of the patellar component.  Once all components were cured with regard to the cement, we copiously irrigated, closed the retinacular incision with #1 Vicryl, followed by 2-0 Vicryl followed by skin staples, Dermabond and Aquacel dressing.  The patient was then transferred to the recovery room in stable condition, having tolerated the procedure well.      POSTOPERATIVE PLAN:  Discharge on postop day #2.  Outpatient therapy, Rhiannon Aquino 2 times a week x 6 weeks.  Aspirin 325 b.i.d. x 4 weeks for DVT prophylaxis and Norco 7.5 mg 1 to 2 p.o. every 6 hours, #40 for pain management.  Follow up with myself in 2 weeks for staple removal.         PHYSICIAN ASSISTANT:  A skilled first assistant was necessary for completion of the procedure in a technically safe and efficient manner, was utilized during intraoperative decision making, retraction, wound closure, prepping and draping and decision making during the procedure critical to a safe and effective outcome.         ANA RAMIREZ MD             D: 2019   T: 2019   MT: MIKEL      Name:     CHADWICK HERNANDEZ   MRN:      [id number]        Account:        [id number]   :      1955           Procedure Date: 2019      Document: [id number]

## 2019-04-11 NOTE — ANESTHESIA PROCEDURE NOTES
Peripheral nerve/Neuraxial procedure note : Adductor canal  Pre-Procedure    Location: pre-op    Procedure Times:4/11/2019 7:20 AM and 4/11/2019 7:25 AM  Pre-Anesthestic Checklist: patient identified, IV checked, site marked, risks and benefits discussed, informed consent, monitors and equipment checked, pre-op evaluation, at physician/surgeon's request and post-op pain management    Timeout  Correct Patient: Yes   Correct Procedure: Yes   Correct Site: Yes   Correct Laterality: Yes   Correct Position: Yes   Site Marked: Yes   .   Procedure Documentation    .    Procedure:  left  Adductor canal.     Ultrasound used to identify targeted nerve, plexus, or vascular marker and placed a needle adjacent to it., Ultrasound was used to visualize the spread of the anesthetic in close proximity to the above stated nerve. A permanent image is entered into the patient's record.  Patient Prep;chlorhexidine gluconate and isopropyl alcohol.  .  Needle: insulated, short bevel Needle Gauge: 20.    Needle Length (Inches) 4  Insertion Method: Single Shot.       Assessment/Narrative  Paresthesias: No.  Injection made incrementally with aspirations every 5 mL..  The placement was negative for: blood aspirated, painful injection and site bleeding.  Bolus given via needle..   Secured via.   Complications: none. Comments:  Assisted by Jonnathan TIERNEY CRNA

## 2019-04-12 ENCOUNTER — APPOINTMENT (OUTPATIENT)
Dept: PHYSICAL THERAPY | Facility: HOSPITAL | Age: 64
DRG: 470 | End: 2019-04-12
Attending: ORTHOPAEDIC SURGERY
Payer: COMMERCIAL

## 2019-04-12 ENCOUNTER — APPOINTMENT (OUTPATIENT)
Dept: OCCUPATIONAL THERAPY | Facility: HOSPITAL | Age: 64
DRG: 470 | End: 2019-04-12
Attending: ORTHOPAEDIC SURGERY
Payer: COMMERCIAL

## 2019-04-12 LAB
COPATH REPORT: NORMAL
GLUCOSE SERPL-MCNC: 123 MG/DL (ref 70–99)
HGB BLD-MCNC: 11.3 G/DL (ref 13.3–17.7)

## 2019-04-12 PROCEDURE — 85018 HEMOGLOBIN: CPT | Performed by: ORTHOPAEDIC SURGERY

## 2019-04-12 PROCEDURE — 12000000 ZZH R&B MED SURG/OB

## 2019-04-12 PROCEDURE — 25000132 ZZH RX MED GY IP 250 OP 250 PS 637: Performed by: NURSE PRACTITIONER

## 2019-04-12 PROCEDURE — 97110 THERAPEUTIC EXERCISES: CPT | Mod: GP

## 2019-04-12 PROCEDURE — 25000132 ZZH RX MED GY IP 250 OP 250 PS 637: Performed by: ORTHOPAEDIC SURGERY

## 2019-04-12 PROCEDURE — 40000275 ZZH STATISTIC RCP TIME EA 10 MIN

## 2019-04-12 PROCEDURE — 97165 OT EVAL LOW COMPLEX 30 MIN: CPT | Mod: GO

## 2019-04-12 PROCEDURE — 82947 ASSAY GLUCOSE BLOOD QUANT: CPT | Performed by: ORTHOPAEDIC SURGERY

## 2019-04-12 PROCEDURE — 25000128 H RX IP 250 OP 636: Performed by: ORTHOPAEDIC SURGERY

## 2019-04-12 PROCEDURE — 99232 SBSQ HOSP IP/OBS MODERATE 35: CPT | Performed by: NURSE PRACTITIONER

## 2019-04-12 PROCEDURE — 97116 GAIT TRAINING THERAPY: CPT | Mod: GP

## 2019-04-12 PROCEDURE — 36415 COLL VENOUS BLD VENIPUNCTURE: CPT | Performed by: ORTHOPAEDIC SURGERY

## 2019-04-12 RX ADMIN — OXYCODONE HYDROCHLORIDE 5 MG: 5 TABLET ORAL at 09:03

## 2019-04-12 RX ADMIN — MULTIPLE VITAMINS W/ MINERALS TAB 1 TABLET: TAB at 09:03

## 2019-04-12 RX ADMIN — KETOROLAC TROMETHAMINE 30 MG: 30 INJECTION INTRAMUSCULAR; INTRAVENOUS at 05:52

## 2019-04-12 RX ADMIN — OXYCODONE HYDROCHLORIDE 5 MG: 5 TABLET ORAL at 14:05

## 2019-04-12 RX ADMIN — SENNOSIDES AND DOCUSATE SODIUM 1 TABLET: 8.6; 5 TABLET ORAL at 09:03

## 2019-04-12 RX ADMIN — ACETAMINOPHEN 975 MG: 325 TABLET, FILM COATED ORAL at 03:44

## 2019-04-12 RX ADMIN — OXYBUTYNIN CHLORIDE 5 MG: 5 TABLET ORAL at 09:03

## 2019-04-12 RX ADMIN — OXYCODONE HYDROCHLORIDE 5 MG: 5 TABLET ORAL at 16:55

## 2019-04-12 RX ADMIN — OMEPRAZOLE 20 MG: 20 CAPSULE, DELAYED RELEASE ORAL at 16:46

## 2019-04-12 RX ADMIN — OXYCODONE HYDROCHLORIDE 5 MG: 5 TABLET ORAL at 00:08

## 2019-04-12 RX ADMIN — OXYBUTYNIN CHLORIDE 5 MG: 5 TABLET ORAL at 21:02

## 2019-04-12 RX ADMIN — ACETAMINOPHEN 975 MG: 325 TABLET, FILM COATED ORAL at 19:18

## 2019-04-12 RX ADMIN — OXYCODONE HYDROCHLORIDE 5 MG: 5 TABLET ORAL at 21:03

## 2019-04-12 RX ADMIN — PRAMIPEXOLE DIHYDROCHLORIDE 0.5 MG: 0.12 TABLET ORAL at 21:02

## 2019-04-12 RX ADMIN — ACETAMINOPHEN 975 MG: 325 TABLET, FILM COATED ORAL at 11:39

## 2019-04-12 RX ADMIN — OMEPRAZOLE 20 MG: 20 CAPSULE, DELAYED RELEASE ORAL at 06:25

## 2019-04-12 RX ADMIN — SENNOSIDES AND DOCUSATE SODIUM 2 TABLET: 8.6; 5 TABLET ORAL at 21:02

## 2019-04-12 RX ADMIN — ASPIRIN 325 MG: 325 TABLET, COATED ORAL at 09:03

## 2019-04-12 RX ADMIN — LISINOPRIL 20 MG: 20 TABLET ORAL at 09:03

## 2019-04-12 RX ADMIN — OXYCODONE HYDROCHLORIDE 5 MG: 5 TABLET ORAL at 03:44

## 2019-04-12 ASSESSMENT — ACTIVITIES OF DAILY LIVING (ADL)
ADLS_ACUITY_SCORE: 14
ADLS_ACUITY_SCORE: 14
ADLS_ACUITY_SCORE: 13
ADLS_ACUITY_SCORE: 13
ADLS_ACUITY_SCORE: 14
ADLS_ACUITY_SCORE: 12

## 2019-04-12 NOTE — ANESTHESIA POSTPROCEDURE EVALUATION
Patient: Ramy Guillermo    Procedure(s):  LEFT TOTAL KNEE ARTHROPLASTY/PERSONA    Diagnosis:LEFT KNEE PAIN  Diagnosis Additional Information: No value filed.    Anesthesia Type:  Spinal, Periph. Nerve Block for postop pain    Note:  Anesthesia Post Evaluation    Patient location during evaluation: Bedside  Patient participation: Able to fully participate in evaluation  Level of consciousness: awake and alert  Pain management: adequate  Airway patency: patent  Cardiovascular status: acceptable  Respiratory status: acceptable  Hydration status: acceptable  PONV: none     Anesthetic complications: None          Last vitals:  Vitals:    04/12/19 0344 04/12/19 0429 04/12/19 0858   BP:   131/67   Pulse:      Resp: 16 17 16   Temp:   97.4  F (36.3  C)   SpO2:   97%         Electronically Signed By: ABRAHAM Gannon CRNA  April 12, 2019  10:28 AM

## 2019-04-12 NOTE — PLAN OF CARE
Discharge Planner PT   Patient plan for discharge  Home with home PT  Current status: Pt did very well with all exercise and gait.  Barriers to return to prior living situation: none  Recommendations for discharge: home PT  Rationale for recommendations:Pt did very well with stairs using two hand rails.  Reviewed all exercises for home program.       Entered by: Belen Weldon 04/12/2019 2:36 PM

## 2019-04-12 NOTE — PROGRESS NOTES
Assessment completed see flow sheet.    LOC: alert   Others present: Patient and Family     Dx: LTKA    Lives with: spouse Haylie and cat Lesly  Living at:  Home in [place]  Transportation: YES     Primary PCP: Mary Wooten  Insurance:  [employer]  Medicare no letter needed has commercial insurance.    Support System:  Wife, siblings, friends  Homecare/PCA: no  /County Services:   no  : NO      VA Referral line called: N/A    Health Care Directive: YES, on file  Guardian: no  POA: no    Pharmacy: [facility]  Meds management: YES     Adequate Resources for needs (housing, utilities, food/med): YES  Household chores: attempts to help  Work/community/social activity: YES, goes out weekly with friends or family    ADLs: independent  Ambulation:independent  Falls: no  Nutrition: no concern  Sleep: 5 hrs nightly and naps    Equipment used: walker, cane, raised toilet, grab bars, lift chair      Oxygen supplier: n/a      Does the supplier have valid oxygen orders: n/a    Mental health: no  Substance abuse: no  Exposure to violence/abuse: no  Stressors: when his sports 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

## 2019-04-12 NOTE — PROGRESS NOTES
[facility]    Hospitalist Progress Note    Date of Service (when I saw the patient): 04/12/2019    Assessment & Plan        S/P left TKA: Per surgery. No immediate post-operative complications. Pain is under good control so far.       Essential hypertension, benign: Running high post-opertively but improved after restarting his home dose lisinopril.      GERD (gastroesophageal reflux disease): As he will be on daily ASA x4 weeks, will give daily PPI until complete.       Overactive bladder: Continue home dose ditropan, will need to watch for post-operative retention.     DVT Prophylaxis: Defer to primary service-ASA  Code Status: Full Code    Disposition: Expected discharge tomorrow once cleared by orthopedics/PT.    America Cruz, CNP    Subjective:  Denies chest pain, dyspnea, abdominal pain or nausea. He denies any pain to the left leg, he is already able to do straight leg raises.    -Data reviewed today: I reviewed all new labs and imaging results over the last 24 hours.    Physical Exam   Temp: 97.4  F (36.3  C) Temp src: Tympanic BP: 131/67 Pulse: 120 Heart Rate: 61 Resp: 17 SpO2: 97 % O2 Device: None (Room air)    Vitals:    04/11/19 0644 04/11/19 1052   Weight: 104.3 kg (230 lb) 107.1 kg (236 lb 1.8 oz)     Vital Signs with Ranges  Temp:  [97.4  F (36.3  C)-98.4  F (36.9  C)] 97.4  F (36.3  C)  Pulse:  [120] 120  Heart Rate:  [] 61  Resp:  [15-17] 17  BP: (131-176)/() 131/67  SpO2:  [94 %-97 %] 97 %  I/O last 3 completed shifts:  In: 2326 [P.O.:1080; I.V.:1246]  Out: 1570 [Urine:1550; Blood:20]    Peripheral IV 08/30/16 Right Hand (Active)   Site Assessment WDL 4/12/2019  8:00 AM   Line Status Saline locked 4/12/2019  8:00 AM   Phlebitis Scale 0-->no symptoms 4/12/2019  8:00 AM   Infiltration Scale 0 4/12/2019  8:00 AM   Extravasation? No 4/11/2019  3:42 PM   Number of days: 955       Incision/Surgical Site 04/11/19 Left Knee (Active)   Incision Assessment UTV 4/12/2019  8:57 AM    Ivon-Incision Assessment Edema 4/12/2019  8:57 AM   Closure Liquid bandage 4/11/2019  9:33 AM   Incision Drainage Amount None 4/12/2019  8:57 AM   Dressing Intervention Clean, dry, intact 4/12/2019  8:57 AM   Number of days: 1     Line/device assessment(s) completed for medical necessity    Constitutional: Awake,alert, no acute distress  Respiratory: Clear bilaterally without wheezes, rhonchi or crackles.   Cardiovascular: HRR, no murmurs, rubs, thrills.  GI: Soft,nontender, bowel sounds positive but hypoactive  Skin/Integumen: No rashes, bruising or open areas.   Other:  Left leg distal CMS intact,  bandage and ice in place.     Medications     lactated ringers Stopped (04/11/19 1134)       acetaminophen  975 mg Oral Q8H     aspirin  325 mg Oral Daily     lisinopril  20 mg Oral Daily     multivitamin w/minerals  1 tablet Oral Daily     omeprazole  20 mg Oral BID AC     oxybutynin  5 mg Oral BID     pramipexole  0.5 mg Oral At Bedtime     scopolamine   Transdermal Q8H     scopolamine   Transdermal Once     senna-docusate  1 tablet Oral BID    Or     senna-docusate  2 tablet Oral BID     sodium chloride (PF)  3 mL Intracatheter Q8H       Data   Recent Labs   Lab 04/12/19  0513   HGB 11.3*   *       No results found for this or any previous visit (from the past 24 hour(s)).

## 2019-04-12 NOTE — PLAN OF CARE
Discharge Planner PT   Patient plan for discharge: Home with homecare as he will not be able to drive in the next week or so and wife is working. Transition to OP PT services at  [facility].   Current status: Patient had good exercise tolerance and ambulated 800 feet with one seated rest break using FWW SBA to CGA. Patient completed 4 stairs x 2 with 2 hand rails. Pain was well controlled.   Barriers to return to prior living situation: None present.   Recommendations for discharge: Home with homecare or OP PT  Rationale for recommendations: driving/transportation is a barrier.        Entered by: Radha Remy 04/12/2019 11:24 AM

## 2019-04-12 NOTE — PLAN OF CARE
"First BP reading elevated. Possible erroneous reading. Second BP reading slightly elevated as well. Pt afebrile. SaO2 mid 90s on RA. Pt c/o pain rated 4-5/10. Scheduled ketorolac and tylenol administered. 5 mg PRN oxycodone administered at 1717 and 2105. Relief noted. Ice also applied to knee. Relief noted.     Lungs: clear. GI: Normal bowel sounds. Abd soft and nontender. Pt tolerating regular diet and drinking fluids. No c/o nausea. : Pt reported not dribbling as much this afternoon and claimed \"I have better control over my bladder!\" Urinal within reach. CMS checks: LLE and RLE warm to the touch; adequate capillary refill; pt able to wiggle toes. Pedal pulses 2+. Pt up to chair this shift and ambulated in room. SBAx1 with walker.     ABX: IV Ancef this shift. IV saline-locked. Call light within reach.   "

## 2019-04-12 NOTE — PROGRESS NOTES
Inpatient Occupational Therapy Evaluation    Name: Ramy Guillermo MRN# [id number]   Age: 64 year old YOB: 1955     Date of Consultation: 2019  Consultation is requested by:  Dr. Cunha   Specific Consultation Request:  Post-Op Total Knee  Primary care provider: Mary Wooten    General Information:   Onset Date: 19    History of Current Problem/Admission: Left knee pain [M25.562]    Prior Level of Function: Pt previously independent in ADLs. He did not use an assistive device for mobility.   Ambulation: 0 - Independent   Transferrin - Independent   Toiletin - Assistive Equipment    Bathin - Assistive Equipment   Dressin - Independent   Eatin - Independent   Communication: 0 - Understands/communicates without difficulty  Swallowin - swallows foods/liquids without difficulty  Cognition: 0 - no cognitive issues reported    Fall history within the last 6 months: No    Current Living Situation: Pt lives with his wife in a 2 story home with 2 steps to enter with railings. Pt's office was upstairs but he relocated that to the main level. Bathroom has a walk in shower with grab bars and a raised toilet with grab bars.     Current Equipment Used at Home: Has a raised toilet, grab bars, walk in shower, reacher, sock aid, cane, and a walker    Patient & Family Goals: Return home tomorrow      Past Medical History:   Past Medical History:   Diagnosis Date     Allergic rhinitis      Certain adverse effects, not elsewhere classified, other     required more sedation preop R vein ablation     Colon adenoma 8/3/2012    colonoscopy due 2017      DJD (degenerative joint disease) of lumbar spine     L4-L5 & L5-S1     Duodenal ulcer, unspecified as acute or chronic, without hemorrhage, perforation, or obstruction      Essential hypertension, benign      GERD (gastroesophageal reflux disease)      PONV (postoperative nausea and vomiting)        Past Surgical  History:  Past Surgical History:   Procedure Laterality Date     ABLATE VEIN VARICOSE RADIO FREQUENCY WITH PHLEBECTOMY MULTIPLE STAB  10/7/11, 11/4/11    RT, LT Dr. Cruz Robert     ABLATE VEIN VARICOSE RADIO FREQUENCY WITH PHLEBECTOMY MULTIPLE STAB  10/7/2011    Procedure:ABLATE VEIN VARICOSE RADIO FREQUENCY WITH PHLEBECTOMY MULTIPLE STAB; Right Radio Frenquency with Greater Saphenous with  Phlebectomy Multiple Stab; Surgeon:CRUZ ROBERT; Location:[facility]     ABLATE VEIN VARICOSE RADIO FREQUENCY WITH PHLEBECTOMY MULTIPLE STAB  11/4/2011    Procedure:ABLATE VEIN VARICOSE RADIO FREQUENCY WITH PHLEBECTOMY MULTIPLE STAB; Left Radio Frenquency with Greater Saphenous Vein with  Bilateral Phlebectomy Multiple Stab Varicose Veins.; Surgeon:CRUZ ROBERT; Location:[facility] OR     ABLATE VEIN VARICOSE RADIO FREQUENCY WITH PHLEBECTOMY MULTIPLE STAB  11/28/2012    Procedure: ABLATE VEIN VARICOSE RADIO FREQUENCY WITH PHLEBECTOMY MULTIPLE STAB;   Bilateral stab Micro- phlebectomies;  Surgeon: Dany Mascorro MD;  Location: [facility] OR     ARTHROPLASTY KNEE Left 4/11/2019    Procedure: LEFT TOTAL KNEE ARTHROPLASTY/PERSONA;  Surgeon: Lamonte Cunha MD;  Location: [facility] OR     ARTHROSCOPY KNEE  3/1/2013    Procedure: ARTHROSCOPY KNEE;  Right knee arthoscopy w/ medial menisectomy and chondroplasty;  Surgeon: Abiodun Jacobson MD;  Location: [facility] OR     C EXCIS BOWEL LESION(S),SINGLE ENTEROTOMY  1968    small intestine     C RECONSTR TOTAL SHOULDER IMPLANT Right 10/24/2017     COLONOSCOPY N/A 8/30/2016    Procedure: COLONOSCOPY;  Surgeon: Calos Post MD;  Location: [facility] OR     COLONOSCOPY N/A 12/8/2017    Procedure: COLONOSCOPY;  COLONOSCOPY;  Surgeon: Calos Post MD;  Location: [facility]     FLEXIBLE SIGMOIDOSCOPY  1/9/07     HC COLONOSCOPY W BIOPSY  8/3/12    proximal ascending, tubular adenoma     HC EXCISION OF UVULA  2001    for snoring     HC KNEE SCOPE,MED/LAT MENISECTOMY  7/11/05    RT + chondroplasty of medial femoral condyl &  patellofemoral joint, Dr. Verma     HC PARTIAL REMOVAL/REPAIR,ACROMION  12/20/13    Left, Sachin      REPAIR BICEPS LONG TENDON  12/20/13    Left     HERNIA REPAIR, INGUINAL RT/LT  01/10/08    LT w/onlay mesh patch, Dr. Mchugh     JOINT REPLACEMENT, HIP RT/LT  9/06    RT hip RESURFACING arthroplasty, Dr. Ra Valdivia @[facility]       Medications:   Current Facility-Administered Medications   Medication     [START ON 4/14/2019] acetaminophen (TYLENOL) tablet 650 mg     acetaminophen (TYLENOL) tablet 975 mg     aspirin (ASA) EC tablet 325 mg     benzocaine-menthol (CEPACOL) 15-3.6 MG lozenge 1-2 lozenge     HYDROmorphone (PF) (DILAUDID) injection 0.3-0.5 mg     lactated ringers infusion     lidocaine (LMX4) kit     lidocaine 1 % 0.1-1 mL     lisinopril (PRINIVIL/ZESTRIL) tablet 20 mg     methocarbamol (ROBAXIN) tablet 750 mg     metoclopramide (REGLAN) tablet 10 mg    Or     metoclopramide (REGLAN) injection 10 mg     multivitamin w/minerals (THERA-VIT-M) tablet 1 tablet     omeprazole (priLOSEC) CR capsule 20 mg     ondansetron (ZOFRAN-ODT) ODT tab 4 mg    Or     ondansetron (ZOFRAN) injection 4 mg     oxybutynin (DITROPAN) tablet 5 mg     oxyCODONE (ROXICODONE) tablet 5-10 mg     pramipexole (MIRAPEX) tablet 0.5 mg     prochlorperazine (COMPAZINE) injection 10 mg    Or     prochlorperazine (COMPAZINE) tablet 10 mg     scopolamine (TRANSDERM-SCOP) patch REMOVAL     senna-docusate (SENOKOT-S/PERICOLACE) 8.6-50 MG per tablet 1 tablet    Or     senna-docusate (SENOKOT-S/PERICOLACE) 8.6-50 MG per tablet 2 tablet     sodium chloride (PF) 0.9% PF flush 3 mL     sodium chloride (PF) 0.9% PF flush 3 mL     zolpidem (AMBIEN) tablet 5-10 mg       Weight Bearing Status: WBAT     Cognitive Status Examination:  Orientation: awake and alert and oriented to time, place and person  Level of Consciousness: alert  Follows Commands and Answers Questions: 100% of the time and able to follow multistep commands  Personal Safety and Judgement:  Intact  Memory: Immediate recall intact and Long-term memory intact  Comments:     Pain:   Currently in pain? Yes  Pain Location? left knee  Pain Ratin    Occupational Therapy Evaluation:   Integumentary/Edema: bandage on incision, post op large wrapping removed    Range of Motion: L shoulder limited to 80-90*, remaining LUE and RUE WNL  Strength: L shoulder 2+/5, R shoulder 4/5, bilateral elbows 4/5  Hand Strength: Bilateral gross grasp good   Muscle Tone Assessment: No deficits  Coordination: Normal     Mobility:   Transfer Skills: Independent   Sit to Stand: Independent   Toilet Transfer: Independent using grab bars    Balance: Good     ADLs:   Lower Body Dressing: Independent   Toileting: Independent  Grooming: Independent     IADLs:   Previous Responsibilities of the Patient: Meal Prep, Housekeeping, Laundry, Shopping, Yard Work, Medication Management, Finances  and Driving   Comments: Pt is retired. His wife still works but also assists with (or can assist with) housekeeping, shopping, and yard work.     Activities of Daily Living Analysis:   Impairments Contributing to Impaired Activities of Daily Living: Pain  Comments:     Occupational Therapy Interventions: Eval only     Clinical Impressions:  Criteria for Skilled Therapeutic Intervention Met: Evaluation Only and No problems identified which require skilled intervention  OT Diagnosis: L TKA  Influenced by the following impairments: Pain  Functional limitations due to impairment: None  Clinical presentation: Stable/Uncomplicated  Clinical presentation rationale: Clinical judgement   Clinical Decision making (complexity): Low Complexity  Frequency: Eval only  Predicted Duration of Therapy Intervention (days/wks): Today  Anticipated Discharge Disposition: Home  Anticipated Equipment Needs at Discharge: N/A  Risks and Benefits of therapy have been explained: Yes  Patient, Family & other staff in agreement with plan of care: Yes  Clinical Impression  Comments: Pt completed ADLs independently. No concerns with his ability to complete them at home. Also, no concerns with pt's home accessibility. Pt has good support upon discharge, too. No further skilled OT needs identified at this time. Pt safe to return home with his wife. Will complete OT order.     Total Eval Time: 25

## 2019-04-12 NOTE — PLAN OF CARE
VSS, rating pain 2-5/10 medicated per MAR. LS CTA bilat. BS normoactive, passing flatus. Ambulating Assist x1 w/FWW & GB. Voiding adequate urine spontaneously. CMS to BLE +, pedal pulses +2. Bulky dressing removed, aqua-cell in place, no drainage present, edema to surrounding area. Up in chair for meals, ambulating in schmidt w/PT. IV SL'd flushing w/o difficulty. Call light w/in reach, making needs known. Family in to visit.    Face to face report given with opportunity to observe patient.    Report given to Leydi JOSEPH RN.     Micheline Hopkins   4/12/2019  3:31 PM

## 2019-04-12 NOTE — PLAN OF CARE
Discharge Planner OT   Patient plan for discharge: Home  Current status: Independent completing ADLs  Barriers to return to prior living situation: None  Recommendations for discharge: Home with wife  Rationale for recommendations: Pt completed ADLs independently. No concerns with his ability to complete them at home. Also, no concerns with pt's home accessibility. Pt has good support upon discharge, too. No further skilled OT needs identified at this time. Pt safe to return home with his wife. Will complete OT order.        Entered by: Jackie Harris 04/12/2019 3:09 PM

## 2019-04-12 NOTE — PLAN OF CARE
Face to face report given with opportunity to observe patient.    Report given to Micheline BARON RN.    Leydi Shaffer   4/11/2019  11:56 PM

## 2019-04-12 NOTE — PLAN OF CARE
VSS, rating pain 4-6/10 given scheduled medications and Gita per MAR. LS CTA bilat. BS normoactive, passing flatus. Voiding adequate urine spontaneously. IV SL'd flushing w/o difficulty. Ambulates assist x1 w/FWW & GB. Call light w/in reach, making needs known, pt states he has pre-ordered breakfast for 0700 and would like to be up in the chair by that time to eat. Appears to be resting intermittently t/o shift.

## 2019-04-13 ENCOUNTER — APPOINTMENT (OUTPATIENT)
Dept: PHYSICAL THERAPY | Facility: HOSPITAL | Age: 64
DRG: 470 | End: 2019-04-13
Attending: ORTHOPAEDIC SURGERY
Payer: COMMERCIAL

## 2019-04-13 VITALS
RESPIRATION RATE: 16 BRPM | TEMPERATURE: 98.3 F | OXYGEN SATURATION: 95 % | BODY MASS INDEX: 33.06 KG/M2 | SYSTOLIC BLOOD PRESSURE: 171 MMHG | DIASTOLIC BLOOD PRESSURE: 87 MMHG | HEART RATE: 72 BPM | HEIGHT: 71 IN | WEIGHT: 236.11 LBS

## 2019-04-13 LAB
HGB BLD-MCNC: 12.3 G/DL (ref 13.3–17.7)
WBC # BLD AUTO: 9.1 10E9/L (ref 4–11)

## 2019-04-13 PROCEDURE — 97110 THERAPEUTIC EXERCISES: CPT | Mod: GP

## 2019-04-13 PROCEDURE — 36415 COLL VENOUS BLD VENIPUNCTURE: CPT | Performed by: ORTHOPAEDIC SURGERY

## 2019-04-13 PROCEDURE — 25000132 ZZH RX MED GY IP 250 OP 250 PS 637: Performed by: NURSE PRACTITIONER

## 2019-04-13 PROCEDURE — 99239 HOSP IP/OBS DSCHRG MGMT >30: CPT | Performed by: NURSE PRACTITIONER

## 2019-04-13 PROCEDURE — 97116 GAIT TRAINING THERAPY: CPT | Mod: GP

## 2019-04-13 PROCEDURE — 40000275 ZZH STATISTIC RCP TIME EA 10 MIN

## 2019-04-13 PROCEDURE — 85048 AUTOMATED LEUKOCYTE COUNT: CPT | Performed by: NURSE PRACTITIONER

## 2019-04-13 PROCEDURE — 85018 HEMOGLOBIN: CPT | Performed by: ORTHOPAEDIC SURGERY

## 2019-04-13 PROCEDURE — 25000132 ZZH RX MED GY IP 250 OP 250 PS 637: Performed by: ORTHOPAEDIC SURGERY

## 2019-04-13 RX ORDER — ASPIRIN 325 MG
325 TABLET, DELAYED RELEASE (ENTERIC COATED) ORAL 2 TIMES DAILY
Qty: 60 TABLET | Refills: 0 | COMMUNITY
Start: 2019-04-13 | End: 2019-05-13

## 2019-04-13 RX ORDER — AMOXICILLIN 250 MG
1 CAPSULE ORAL 2 TIMES DAILY PRN
COMMUNITY
Start: 2019-04-13 | End: 2020-09-01

## 2019-04-13 RX ORDER — OXYCODONE HYDROCHLORIDE 5 MG/1
5-10 TABLET ORAL EVERY 4 HOURS PRN
Qty: 40 TABLET | Refills: 0 | Status: ON HOLD | OUTPATIENT
Start: 2019-04-13 | End: 2019-11-15

## 2019-04-13 RX ADMIN — LISINOPRIL 20 MG: 20 TABLET ORAL at 08:03

## 2019-04-13 RX ADMIN — ASPIRIN 325 MG: 325 TABLET, COATED ORAL at 08:03

## 2019-04-13 RX ADMIN — MULTIPLE VITAMINS W/ MINERALS TAB 1 TABLET: TAB at 08:02

## 2019-04-13 RX ADMIN — ACETAMINOPHEN 975 MG: 325 TABLET, FILM COATED ORAL at 04:25

## 2019-04-13 RX ADMIN — SENNOSIDES AND DOCUSATE SODIUM 1 TABLET: 8.6; 5 TABLET ORAL at 08:03

## 2019-04-13 RX ADMIN — OXYBUTYNIN CHLORIDE 5 MG: 5 TABLET ORAL at 08:02

## 2019-04-13 RX ADMIN — OMEPRAZOLE 20 MG: 20 CAPSULE, DELAYED RELEASE ORAL at 08:03

## 2019-04-13 RX ADMIN — OXYCODONE HYDROCHLORIDE 10 MG: 5 TABLET ORAL at 04:25

## 2019-04-13 RX ADMIN — OXYCODONE HYDROCHLORIDE 10 MG: 5 TABLET ORAL at 08:02

## 2019-04-13 RX ADMIN — ACETAMINOPHEN 975 MG: 325 TABLET, FILM COATED ORAL at 11:52

## 2019-04-13 RX ADMIN — OXYCODONE HYDROCHLORIDE 10 MG: 5 TABLET ORAL at 11:52

## 2019-04-13 RX ADMIN — OXYCODONE HYDROCHLORIDE 10 MG: 5 TABLET ORAL at 01:15

## 2019-04-13 ASSESSMENT — ACTIVITIES OF DAILY LIVING (ADL)
ADLS_ACUITY_SCORE: 16
ADLS_ACUITY_SCORE: 14
ADLS_ACUITY_SCORE: 16
ADLS_ACUITY_SCORE: 16

## 2019-04-13 NOTE — DISCHARGE SUMMARY
[facility]    Discharge Summary  Hospitalist    Date of Admission:  4/11/2019  Date of Discharge:  4/13/2019 12:05 PM  Discharging Provider: America Cruz CNP  Date of Service (when I saw the patient): 04/13/19    Discharge Diagnoses   Active Problems:      S/P total left knee arthroplasty    Essential hypertension, benign    GERD (gastroesophageal reflux disease)    Overactive bladder    Hypertension goal BP (blood pressure) < 140/90    Left knee pain      History of Present Illness   Ramy Guillermo is an 64 year old male who presented for planned left TKA.     Hospital Course   Ramy Guillermo was admitted on 4/11/2019.  The following problems were addressed during his hospitalization:       S/P left TKA: Per surgery. No immediate post-operative complications. Pain has been under good control. Fever last night most likely inflammatory response as he did not feel feverish, resolved this morning and WBC is normal.        Essential hypertension, benign: Running high postoperatively but improved after restarting his home dose lisinopril.       GERD (gastroesophageal reflux disease): As he will be on daily ASA x4 weeks, will give daily PPI until complete.        Overactive bladder: Continue home dose ditropan, no issues with post-op voiding.      America Cruz CNP      Code Status   Full Code       Primary Care Physician   Mary Wooten      Discharge Disposition   Discharged to home  Condition at discharge: Stable    Consultations This Hospital Stay   OCCUPATIONAL THERAPY ADULT IP CONSULT  PHYSICAL THERAPY ADULT IP CONSULT  HOSPITALIST IP CONSULT  SOCIAL WORK IP CONSULT    Time Spent on this Encounter   I, America Cruz, personally saw the patient today and spent greater than 30 minutes discharging this patient.    Discharge Orders      Home Care PT Referral for Hospital Discharge      Reason for your hospital stay    Left total knee arthroscopy     Follow-up and recommended labs and  tests     Follow up with orthopedics as scheduled in 2 weeks.     Activity    Your activity upon discharge: activity as tolerated     When to contact your care team    Call your primary doctor if you have any of the following: fever, increased swelling, redness or drainage from wound.     Wound care and dressings    Instructions to care for your wound at home: ice to area for comfort, keep current Aquacel dressing in place x1 week then change, sooner if it becomes dislodged. Staples will be removed at follow-up appointment.     MD face to face encounter    Documentation of Face to Face and Certification for Home Health Services    I certify that patient: Ramy Guillermo is under my care and that I, or a nurse practitioner or physician's assistant working with me, had a face-to-face encounter that meets the physician face-to-face encounter requirements with this patient on: 4/13/2019.    This encounter with the patient was in whole, or in part, for the following medical condition, which is the primary reason for home health care: Left TKA.    I certify that, based on my findings, the following services are medically necessary home health services: Physical Therapy.    My clinical findings support the need for the above services because: Physical Therapy Services are needed to assess and treat the following functional impairments: impaired mobility s/p left tka.    Further, I certify that my clinical findings support that this patient is homebound (i.e. absences from home require considerable and taxing effort and are for medical reasons or [employer] services or infrequently or of short duration when for other reasons) because: Requires assistance of another person or specialized equipment to access medical services because patient: Has prohibitive pain during ambulation. and Range of motion limitations prevents ability to exit home safely...    Based on the above findings. I certify that this patient is confined to the  home and needs intermittent skilled nursing care, physical therapy and/or speech therapy.  The patient is under my care, and I have initiated the establishment of the plan of care.  This patient will be followed by a physician who will periodically review the plan of care.  Physician/Provider to provide follow up care: Mary Wooten    Attending hospital physician (the Medicare certified [place] provider): Dr. Conley  Physician Signature: See electronic signature associated with these discharge orders.  Date: 4/13/2019     Full Code     Diet    Follow this diet upon discharge: Orders Placed This Encounter      Advance Diet as Tolerated: Regular Diet Adult     Discharge Medications   Current Discharge Medication List      START taking these medications    Details   aspirin (ASA) 325 MG EC tablet Take 1 tablet (325 mg) by mouth 2 times daily  Qty: 60 tablet, Refills: 0    Associated Diagnoses: Status post total left knee replacement      oxyCODONE (ROXICODONE) 5 MG tablet Take 1-2 tablets (5-10 mg) by mouth every 4 hours as needed for moderate to severe pain  Qty: 40 tablet, Refills: 0    Associated Diagnoses: Status post total left knee replacement      senna-docusate (SENOKOT-S/PERICOLACE) 8.6-50 MG tablet Take 1 tablet by mouth 2 times daily as needed for constipation    Associated Diagnoses: Status post total left knee replacement         CONTINUE these medications which have NOT CHANGED    Details   acetaminophen (TYLENOL) 325 MG tablet Take 2 tablets by mouth every 6 hours as needed.      IBUPROFEN PO Take 200 mg by mouth every 6 hours as needed for moderate pain      lisinopril (PRINIVIL,ZESTRIL) 20 MG tablet Take 1 tablet (20 mg) by mouth daily for blood pressure.  Qty: 90 tablet, Refills: 1    Associated Diagnoses: Essential hypertension, benign      methocarbamol (ROBAXIN) 750 MG tablet Take 1-2 tablets (750-1,500 mg) by mouth every 6 hours as needed for muscle spasm.  Qty: 90 tablet, Refills: 1     Associated Diagnoses: Essential hypertension, benign      MULTI VITAMIN MENS OR TABS 1 TABLET DAILY      Naproxen Sodium (ALEVE PO) Take 220 mg by mouth 2 times daily as needed for moderate pain      omeprazole (PRILOSEC) 40 MG capsule Take 1 capsule (40 mg) by  mouth daily before a meal  for reflux.  Qty: 90 capsule, Refills: 1    Associated Diagnoses: GERD (gastroesophageal reflux disease)      oxybutynin (DITROPAN) 5 MG tablet Take 1 tablet (5 mg) by  mouth 2 times daily as  needed for overactive  bladder.  Qty: 180 tablet, Refills: 0    Associated Diagnoses: Hypertonicity of bladder      pramipexole (MIRAPEX) 0.5 MG tablet 0.5 mg At Bedtime       sildenafil (VIAGRA) 100 MG tablet Take 0.5-1 tablets ( mg) by mouth daily as needed for erectile dysfunction (1-3 hours before intimacy) Maximum 1 dose per 24 hours.  Qty: 30 tablet, Refills: 12    Comments: May dispense 90-day supply w/ 3 refills per pt request.  Associated Diagnoses: Erectile dysfunction      zolpidem (AMBIEN) 10 MG tablet Take 0.5-1 tablets (5-10 mg) by mouth nightly as needed for sleep (take right at bedtime)  Qty: 90 tablet, Refills: 1    Associated Diagnoses: Insomnia           Allergies   Allergies   Allergen Reactions     No Known Drug Allergies      Data   Most Recent 3 CBC's:  Recent Labs   Lab Test 04/13/19  0555 04/12/19  0513 09/19/17  0820 04/09/16  2147   WBC 9.1  --  5.3 4.3   HGB 12.3* 11.3* 13.0* 10.9*   MCV  --   --  87 76*   PLT  --   --  269 230      Most Recent 3 BMP's:  Recent Labs   Lab Test 04/12/19  0513 01/29/19 09/19/17  0820 06/05/15  0734 09/08/14  0753   NA  --   --  141 140 137   POTASSIUM  --  4.4 4.3 4.5 4.5   CHLORIDE  --   --  106 104 103   CO2  --   --  27 27 27   BUN  --   --  25 18 19   CR  --  0.99 0.88 1.10 1.06   ANIONGAP  --   --  8 9 7   RACHEL  --   --  9.1 9.4 9.2   * 75 76 90 93     Most Recent 2 LFT's:  Recent Labs   Lab Test 01/29/19 09/19/17  0820   AST 23 21   ALT 19 32   ALKPHOS  --  102    BILITOTAL  --  0.5     Most Recent INR's and Anticoagulation Dosing History:  Anticoagulation Dose History     Recent Dosing and Labs Latest Ref Rng & Units 2/26/2013    INR 0.86 - 1.14 0.96        Most Recent 3 Troponin's:No lab results found.  Most Recent Cholesterol Panel:  Recent Labs   Lab Test 09/19/17  0820   CHOL 195   *   HDL 63   TRIG 80     Most Recent 6 Bacteria Isolates From Any Culture (See EPIC Reports for Culture Details):No lab results found.  Most Recent TSH, T4 and A1c Labs:  Recent Labs   Lab Test 09/19/17  0820   TSH 1.35     Results for orders placed or performed during the hospital encounter of 04/11/19   XR Knee Port Left 1/2 Views    Narrative    PROCEDURE:  XR KNEE PORT LT 1/2 VW    HISTORY: postop TKR    COMPARISON:  None.    TECHNIQUE:  2 views of the left knee were obtained.    FINDINGS:  There is a knee prosthesis in place. Gas is seen within the  joint postoperatively. The distal femur and the proximal tibia and  fibula and patella appear intact.       Impression    IMPRESSION: Knee prosthesis in place      CARTER CURRY MD

## 2019-04-13 NOTE — DISCHARGE INSTRUCTIONS
YOU ARE TO FOLLOW UP AT [facility] [place] ON THE 23RD OF April AT 10 AM WITH OLINDA .

## 2019-04-13 NOTE — PLAN OF CARE
"Reason for hospital stay:  L TKA  Most recent vitals: /66   Pulse 72   Temp 99.8  F (37.7  C) (Tympanic)   Resp 18   Ht 1.803 m (5' 11\")   Wt 107.1 kg (236 lb 1.8 oz)   SpO2 95%   BMI 32.93 kg/m    Pain Management:  Scheduled Tylenol et Oxycodone q3hrs with good relief, Cryo-cuff in place  Orientation:  A/O  Cardiac:  WDL  Respiratory:  Lung sounds clear bilat  GI:  Bowel sounds audible et active x4, denies n/v  :  WDL  Skin Issues:  Aquacel dressing has scant amt, approx pea sized, area of drainage. Denies numbness or tingling.   IVF:  Saline locked  ABX:  n/a  Mobility:  SBA with gait belt et walker to ambulate to BR  Safety:  A/O calls for assist appropriately   Comments:        4/13/2019  4:14 AM  Rika Landin  Face to face report given with opportunity to observe patient.    Report given to  Cristina Landin   4/13/2019  7:31 AM      "

## 2019-04-13 NOTE — PLAN OF CARE
Face to face report given with opportunity to observe patient.    Report given to Rika MOHAN RN.    Leydi Shaffer   4/12/2019  11:30 PM

## 2019-04-13 NOTE — PROGRESS NOTES
Name: Ramy Guillermo    MRN#: [id number]    Reason for Hospitalization: Left knee pain [M25.562]    PEDRO: low    Discharge Date: 4/13/2019    Patient / Family response to discharge plan: agreeable     Follow-Up Appt:   Future Appointments   Date Time Provider Department Center   4/13/2019 10:00 AM Balwinder Norris, PT [facility]   4/24/2019  8:00 AM Mae Almaraz, PT [facility]       Other Providers (Care Coordinator, County Services, PCA services etc): Yes: [facility], orders faxed    Discharge Disposition: home via wife, Haylie Garduno Libby

## 2019-04-13 NOTE — PLAN OF CARE
Vitals stable. Alert and oriented. Complained of left knee pain and received PRN oxycodone at 0800 and 1150 with adequate relief. Dressing shows no increase in shadowing. Left knee warm to touch and slightly red. IV saline locked and removed. Tolerated regular diet with no nausea or vomiting. Ambulated assist of 1 with gait belt and walker. Discharge instructions reviewed with patient and spouse. Follow up appointment made. Call light within reach and makes needs known.     Patient discharged at 12:05 PM via wheel chair accompanied by spouse and staff. Prescriptions sent with patient to fill . All belongings sent with patient.     Discharge instructions reviewed with patient and wife. Listed belongings gathered and returned to patient.     Patient discharged to home.   Surgical Patient   Surgical Procedures during stay: left total knee  Did patient receive discharge instruction on wound care and recognition of infection symptoms? Yes    MISC  Follow up appointment made:  Yes  Home and hospital aquired medications returned to patient: Yes  Patient reports pain was well managed at discharge: Yes

## 2019-04-15 ENCOUNTER — TELEPHONE (OUTPATIENT)
Dept: CASE MANAGEMENT | Facility: HOSPITAL | Age: 64
End: 2019-04-15

## 2019-04-15 NOTE — PROGRESS NOTES
Physical Therapy Discharge Summary    Reason for therapy discharge:    All goals and outcomes met, no further needs identified.    Progress towards therapy goal(s). See goals on Care Plan in [facility] electronic health record for goal details.  Goals met    Therapy recommendation(s):    Continue home exercise program.

## 2019-04-15 NOTE — TELEPHONE ENCOUNTER
Ramy Guillermo, was discharged to home on 4/13/19  from [facility]. I spoke today with him regarding his discharge.   He indicates he has receive(d) sufficient information upon discharge. Medications were reviewed in full on discharge, including: Medications to be started; medications to be continued from preadmission and any side effects. Prescriptions were received at discharge and were able to be filled. Medications are being taken as prescribed.   He indicates he has  an appointment scheduled for April 23  and has  transportation available. He was  able to confirm his  appointment time and has  it written down.   He did not have any questions regarding discharge instructions or condition.  Per their request, the following employee (s) can be recognized for their outstanding services delivered:  Very impressed with the care.   Suggestions to improve service: Nothing indicated.   He was informed they may receive a survey in the mail from the hospital. Asked if they would kindly complete the survey in order for [facility] to know if services fully met patient needs.

## 2019-04-24 ENCOUNTER — HOSPITAL ENCOUNTER (OUTPATIENT)
Dept: PHYSICAL THERAPY | Facility: HOSPITAL | Age: 64
Setting detail: THERAPIES SERIES
End: 2019-04-24
Attending: ORTHOPAEDIC SURGERY
Payer: COMMERCIAL

## 2019-04-24 PROCEDURE — 97110 THERAPEUTIC EXERCISES: CPT | Mod: GP | Performed by: PHYSICAL THERAPIST

## 2019-04-24 PROCEDURE — 97161 PT EVAL LOW COMPLEX 20 MIN: CPT | Mod: GP | Performed by: PHYSICAL THERAPIST

## 2019-04-24 ASSESSMENT — ACTIVITIES OF DAILY LIVING (ADL)
RISE FROM A CHAIR: ACTIVITY IS MINIMALLY DIFFICULT
GIVING WAY, BUCKLING OR SHIFTING OF KNEE: THE SYMPTOM AFFECTS MY ACTIVITY SLIGHTLY
KNEE_ACTIVITY_OF_DAILY_LIVING_SCORE: 48.57
GO DOWN STAIRS: ACTIVITY IS SOMEWHAT DIFFICULT
SQUAT: ACTIVITY IS SOMEWHAT DIFFICULT
RAW_SCORE: 34
SIT WITH YOUR KNEE BENT: ACTIVITY IS FAIRLY DIFFICULT
PAIN: THE SYMPTOM AFFECTS MY ACTIVITY MODERATELY
HOW_WOULD_YOU_RATE_THE_CURRENT_FUNCTION_OF_YOUR_KNEE_DURING_YOUR_USUAL_DAILY_ACTIVITIES_ON_A_SCALE_FROM_0_TO_100_WITH_100_BEING_YOUR_LEVEL_OF_KNEE_FUNCTION_PRIOR_TO_YOUR_INJURY_AND_0_BEING_THE_INABILITY_TO_PERFORM_ANY_OF_YOUR_USUAL_DAILY_ACTIVITIES?: 60
GO UP STAIRS: ACTIVITY IS SOMEWHAT DIFFICULT
WALK: ACTIVITY IS FAIRLY DIFFICULT
AS_A_RESULT_OF_YOUR_KNEE_INJURY,_HOW_WOULD_YOU_RATE_YOUR_CURRENT_LEVEL_OF_DAILY_ACTIVITY?: ABNORMAL
STAND: ACTIVITY IS SOMEWHAT DIFFICULT
KNEE_ACTIVITY_OF_DAILY_LIVING_SUM: 34
KNEEL ON THE FRONT OF YOUR KNEE: I AM UNABLE TO DO THE ACTIVITY
WEAKNESS: THE SYMPTOM AFFECTS MY ACTIVITY MODERATELY
HOW_WOULD_YOU_RATE_THE_OVERALL_FUNCTION_OF_YOUR_KNEE_DURING_YOUR_USUAL_DAILY_ACTIVITIES?: ABNORMAL
SWELLING: THE SYMPTOM AFFECTS MY ACTIVITY MODERATELY
STIFFNESS: THE SYMPTOM AFFECTS MY ACTIVITY MODERATELY
LIMPING: THE SYMPTOM AFFECTS MY ACTIVITY SLIGHTLY

## 2019-04-24 NOTE — PROGRESS NOTES
04/24/19 0801   General Information   Type of Visit Initial OP Ortho PT Evaluation   Start of Care Date 04/24/19   Referring Physician Dr. Cunha   Patient/Family Goals Statement get strength back, get rid of SEC, make sure he is progressing correctly, be able to complete yard work   Orders Evaluate and Treat   Orders Comment TKR protocol, 1-2x/week over 6 weeks   Date of Order 04/11/19   Insurance Type Other   Insurance Comments/Visits Authorized [employer]   Medical Diagnosis left TKA   Surgical/Medical history reviewed Yes   Weight-Bearing Status - LLE weight-bearing as tolerated   Body Part(s)   Body Part(s) Knee   Presentation and Etiology   Pertinent history of current problem (include personal factors and/or comorbidities that impact the POC) Pt underwent L TKA on 4/11/19 by Dr. Cunha.  Pt was discharged to home with home PT.  Pt had follow up with surgeon yesterday and had staples removed.  Surgeon reported things were looking good.  Pt has transitioned to a SEC.  Pt is still waking due to pain at night.  Pt states he is taking pain [id number]/day at night before bed.  Pt has had both hips replaced, R knee replaced, and R shoulder replaced.  Pt is still icing, was using it continuously but was advised by surgeon yesterday to stop using continuously and use only with activity or when having increased pain.  Pt has been doing exercises 3x/day as advised by home PT.      Impairments B. Decreased WB tolerance;A. Pain;C. Swelling;D. Decreased ROM;E. Decreased flexibility;F. Decreased strength and endurance;H. Impaired gait;I. Impaired skin integrity   Functional Limitations perform activities of daily living;perform desired leisure / sports activities   Symptom Location left knee   How/Where did it occur From Degenerative Joint Disease   Onset date of current episode/exacerbation 04/11/19   Chronicity New   Pain rating (0-10 point scale) Best (/10);Worst (/10)   Best (/10) 1   Worst (/10) 6   Pain  quality C. Aching   Frequency of pain/symptoms C. With activity   Pain/symptoms are: Other   Pain symptoms comment dependent upon time between pain medications, sleeping   Pain/symptoms exacerbated by A. Sitting;B. Walking;I. Bending;K. Home tasks;J. ADL   Pain/symptoms eased by A. Sitting;H. Cold;E. Changing positions;F. Certain positions;I. OTC medication(s)   Progression of symptoms since onset: Improved   Prior Level of Function   Prior Level of Function-Mobility independent   Prior Level of Function-ADLs independent   Current Level of Function   Patient role/employment history F. Retired   [facility]   Home/community accessibility stairs within home, has second floor.  Bedroom, bathroom, shower are all located on the main floor.     Current equipment-Gait/Locomotion Standard cane   Fall Risk Screen   Fall screen completed by PT   Have you fallen 2 or more times in the past year? No   Have you fallen and had an injury in the past year? No   Is patient a fall risk? No   Knee Objective Findings   Side (if bilateral, select both right and left) Left   Observation readjusts frequently in chair due to knee discomfort   Integumentary  healing incision with steri-strips present on left knee   Posture forward head, rounded posture   Gait/Locomotion ambulates with antalgic gait with SEC, step-to    Balance/Proprioception (Single Leg Stance) NT   Knee ROM Comment pain noted at end range flexion and extension   Knee/Hip Strength Comments Weakness noted in quad/VMO   Left Knee Extension AROM 8 from 0   Left Knee Extension PROM 6 from 0   Left Knee Flexion AROM 80   Left Knee Flexion PROM 84   Left Knee Flexion Strength 4/5   Left Knee Extension Strength 3+/5   Left Hip Abduction Strength 4-/5   Left Quad Set Strength 3/5   Left Gastrocnemius Flexibility tightness present   Left Hamstring Flexibility tightness present   Planned Therapy Interventions   Planned Therapy Interventions balance training;gait  training;joint mobilization;manual therapy;neuromuscular re-education;ROM;strengthening;stretching   Clinical Impression   Criteria for Skilled Therapeutic Interventions Met yes, treatment indicated   PT Diagnosis gait disturbance s/p L TKA   Influenced by the following impairments pain, decreased ROM, decreased strength, poor quad firing, edema   Functional limitations due to impairments Decreased tolerance for sleeping, decreased safety with ambulation, limited tolerance for stairs and community distance ambulation   Clinical Presentation Stable/Uncomplicated   Clinical Presentation Rationale clinical judgement   Clinical Decision Making (Complexity) Low complexity   Therapy Frequency 2 times/Week   Predicted Duration of Therapy Intervention (days/wks) 10 weeks   Risk & Benefits of therapy have been explained Yes   Patient, Family & other staff in agreement with plan of care Yes   Clinical Impression Comments Pt s/p L TKA on 4/11/19 by Dr. Cunha.  Pt has transitioned to use of SEC with ambulation.  Pt has impaired ROM post surgical as well as impaired quad firing and strength.  Pt would benefit from skilled PT to improve ROM, strength, and progress to ambulation without AD.   Education Assessment   Preferred Learning Style Listening;Reading   Barriers to Learning No barriers   ORTHO GOALS   PT Ortho Eval Goals 1;2;3   Ortho Goal 1   Goal Identifier STG 1   Goal Description Pt to be independent and compliant with HEP.   Target Date 05/08/19   Ortho Goal 2   Goal Identifier LTG 1   Goal Description Pt to demonstrate improved AROM of L knee to 0-120 degrees to manage stairs without difficulty.   Target Date 07/03/19   Ortho Goal 3   Goal Identifier LTG 2   Goal Description Pt to tolerate community distance ambulation without AD without pain.   Target Date 07/03/19   Total Evaluation Time   PT Eval, Low Complexity Minutes (12247) 17

## 2019-04-26 ENCOUNTER — HOSPITAL ENCOUNTER (OUTPATIENT)
Dept: PHYSICAL THERAPY | Facility: HOSPITAL | Age: 64
Setting detail: THERAPIES SERIES
End: 2019-04-26
Attending: ORTHOPAEDIC SURGERY
Payer: COMMERCIAL

## 2019-04-26 PROCEDURE — 97110 THERAPEUTIC EXERCISES: CPT | Mod: GP

## 2019-04-29 ENCOUNTER — HOSPITAL ENCOUNTER (OUTPATIENT)
Dept: PHYSICAL THERAPY | Facility: HOSPITAL | Age: 64
Setting detail: THERAPIES SERIES
End: 2019-04-29
Attending: ORTHOPAEDIC SURGERY
Payer: COMMERCIAL

## 2019-04-29 PROCEDURE — 97110 THERAPEUTIC EXERCISES: CPT | Mod: GP

## 2019-05-01 ENCOUNTER — HOSPITAL ENCOUNTER (OUTPATIENT)
Dept: PHYSICAL THERAPY | Facility: HOSPITAL | Age: 64
Setting detail: THERAPIES SERIES
End: 2019-05-01
Attending: ORTHOPAEDIC SURGERY
Payer: COMMERCIAL

## 2019-05-01 PROCEDURE — 97110 THERAPEUTIC EXERCISES: CPT | Mod: GP | Performed by: PHYSICAL THERAPIST

## 2019-05-06 ENCOUNTER — HOSPITAL ENCOUNTER (OUTPATIENT)
Dept: PHYSICAL THERAPY | Facility: HOSPITAL | Age: 64
Setting detail: THERAPIES SERIES
End: 2019-05-06
Attending: ORTHOPAEDIC SURGERY
Payer: COMMERCIAL

## 2019-05-06 PROCEDURE — 97110 THERAPEUTIC EXERCISES: CPT | Mod: GP

## 2019-05-09 ENCOUNTER — HOSPITAL ENCOUNTER (OUTPATIENT)
Dept: PHYSICAL THERAPY | Facility: HOSPITAL | Age: 64
Setting detail: THERAPIES SERIES
End: 2019-05-09
Attending: ORTHOPAEDIC SURGERY
Payer: COMMERCIAL

## 2019-05-09 PROCEDURE — 97110 THERAPEUTIC EXERCISES: CPT | Mod: GP | Performed by: PHYSICAL THERAPIST

## 2019-05-13 ENCOUNTER — HOSPITAL ENCOUNTER (OUTPATIENT)
Dept: PHYSICAL THERAPY | Facility: HOSPITAL | Age: 64
Setting detail: THERAPIES SERIES
End: 2019-05-13
Attending: ORTHOPAEDIC SURGERY
Payer: COMMERCIAL

## 2019-05-13 PROCEDURE — 97110 THERAPEUTIC EXERCISES: CPT | Mod: GP

## 2019-05-15 ENCOUNTER — HOSPITAL ENCOUNTER (OUTPATIENT)
Dept: PHYSICAL THERAPY | Facility: HOSPITAL | Age: 64
Setting detail: THERAPIES SERIES
End: 2019-05-15
Attending: ORTHOPAEDIC SURGERY
Payer: COMMERCIAL

## 2019-05-15 PROCEDURE — 97110 THERAPEUTIC EXERCISES: CPT | Mod: GP | Performed by: PHYSICAL THERAPIST

## 2019-05-15 NOTE — PROGRESS NOTES
Outpatient Physical Therapy Progress Note     Patient: Ramy Guillermo  : 1955    Beginning/End Dates of Reporting Period:  2019 to 5/15/2019    Referring Provider: Dr. Cunha    Therapy Diagnosis: gait disturbance s/p L TKA     Client Self Report: Pt reports he has had increased pain the past couple of days while sleeping.  Pt states his pain with walking is getting better as well.  Pt states he has not used his SEC for the past couple of days.    Objective Measurements:  Objective Measure: PROM   Details: 4-109  Objective Measure: Strength  Details: Quad set: fair, still demonstrates extensor lag with SLR, HS strength 4+/5,               Outcome Measures (most recent score):      Goals:  Goal Identifier STG 1   Goal Description Pt to be independent and compliant with HEP.   Target Date 19   Date Met  19   Progress:     Goal Identifier LTG 1   Goal Description Pt to demonstrate improved AROM of L knee to 0-120 degrees to manage stairs without difficulty.   Target Date 19   Date Met      Progress:     Goal Identifier LTG 2   Goal Description Pt to tolerate community distance ambulation without AD without pain.   Target Date 19   Date Met      Progress:     Goal Identifier     Goal Description     Target Date     Date Met      Progress:     Goal Identifier     Goal Description     Target Date     Date Met      Progress:     Goal Identifier     Goal Description     Target Date     Date Met      Progress:     Goal Identifier     Goal Description     Target Date     Date Met      Progress:     Goal Identifier     Goal Description     Target Date     Date Met      Progress:     Progress Toward Goals:   Progress this reporting period: Pt has made fairly good progress with ROM over just the past week increasing nearly 12 degrees of flexion in the last week.  Pt still has a quad lag with SLR and have been working on improving quad strength firing.  Pt has progressed to ambulating without  assistive device for the most part except when out on uneven surfaces.  Have not progressed strengthening significantly up to this point as we have been working on improving ROM.  Pt would benefit from continued PT to progress ROM and progress strengthening.            Plan:  Continue therapy per current plan of care.    Discharge:  No

## 2019-05-20 ENCOUNTER — HOSPITAL ENCOUNTER (OUTPATIENT)
Dept: PHYSICAL THERAPY | Facility: HOSPITAL | Age: 64
Setting detail: THERAPIES SERIES
End: 2019-05-20
Attending: ORTHOPAEDIC SURGERY
Payer: COMMERCIAL

## 2019-05-20 PROCEDURE — 97110 THERAPEUTIC EXERCISES: CPT | Mod: GP

## 2019-05-23 ENCOUNTER — HOSPITAL ENCOUNTER (OUTPATIENT)
Dept: PHYSICAL THERAPY | Facility: HOSPITAL | Age: 64
Setting detail: THERAPIES SERIES
End: 2019-05-23
Attending: ORTHOPAEDIC SURGERY
Payer: COMMERCIAL

## 2019-05-23 PROCEDURE — 97110 THERAPEUTIC EXERCISES: CPT | Mod: GP | Performed by: PHYSICAL THERAPIST

## 2019-05-29 ENCOUNTER — HOSPITAL ENCOUNTER (OUTPATIENT)
Dept: PHYSICAL THERAPY | Facility: HOSPITAL | Age: 64
Setting detail: THERAPIES SERIES
End: 2019-05-29
Attending: ORTHOPAEDIC SURGERY
Payer: COMMERCIAL

## 2019-05-29 PROCEDURE — 97110 THERAPEUTIC EXERCISES: CPT | Mod: GP | Performed by: PHYSICAL THERAPIST

## 2019-07-26 ENCOUNTER — ANCILLARY PROCEDURE (OUTPATIENT)
Dept: GENERAL RADIOLOGY | Facility: OTHER | Age: 64
End: 2019-07-26
Attending: FAMILY MEDICINE
Payer: COMMERCIAL

## 2019-07-26 PROCEDURE — 73030 X-RAY EXAM OF SHOULDER: CPT | Mod: TC

## 2019-08-06 ENCOUNTER — TRANSFERRED RECORDS (OUTPATIENT)
Dept: HEALTH INFORMATION MANAGEMENT | Facility: HOSPITAL | Age: 64
End: 2019-08-06

## 2019-08-27 ENCOUNTER — TELEPHONE (OUTPATIENT)
Dept: INTERVENTIONAL RADIOLOGY/VASCULAR | Facility: HOSPITAL | Age: 64
End: 2019-08-27

## 2019-08-27 ENCOUNTER — HOSPITAL ENCOUNTER (OUTPATIENT)
Dept: CT IMAGING | Facility: HOSPITAL | Age: 64
End: 2019-08-27
Attending: ORTHOPAEDIC SURGERY
Payer: COMMERCIAL

## 2019-08-27 ENCOUNTER — HOSPITAL ENCOUNTER (OUTPATIENT)
Dept: INTERVENTIONAL RADIOLOGY/VASCULAR | Facility: HOSPITAL | Age: 64
End: 2019-08-27
Attending: ORTHOPAEDIC SURGERY
Payer: COMMERCIAL

## 2019-08-27 ENCOUNTER — HOSPITAL ENCOUNTER (OUTPATIENT)
Dept: INTERVENTIONAL RADIOLOGY/VASCULAR | Facility: HOSPITAL | Age: 64
Discharge: HOME | End: 2019-08-27
Attending: ORTHOPAEDIC SURGERY | Admitting: ORTHOPAEDIC SURGERY
Payer: COMMERCIAL

## 2019-08-27 DIAGNOSIS — M25.512 LEFT SHOULDER PAIN: ICD-10-CM

## 2019-08-27 DIAGNOSIS — M25.612 SHOULDER STIFFNESS, LEFT: ICD-10-CM

## 2019-08-27 DIAGNOSIS — M25.519 SHOULDER PAIN: Primary | ICD-10-CM

## 2019-08-27 LAB
GRAM STN SPEC: NORMAL
Lab: NORMAL
SPECIMEN SOURCE: NORMAL

## 2019-08-27 PROCEDURE — 25500064 ZZH RX 255 OP 636: Performed by: RADIOLOGY

## 2019-08-27 PROCEDURE — 87205 SMEAR GRAM STAIN: CPT | Performed by: ORTHOPAEDIC SURGERY

## 2019-08-27 PROCEDURE — 73201 CT UPPER EXTREMITY W/DYE: CPT | Mod: TC,LT

## 2019-08-27 PROCEDURE — 20610 DRAIN/INJ JOINT/BURSA W/O US: CPT | Mod: TC,LT,59

## 2019-08-27 PROCEDURE — 87070 CULTURE OTHR SPECIMN AEROBIC: CPT | Performed by: ORTHOPAEDIC SURGERY

## 2019-08-27 PROCEDURE — 25000125 ZZHC RX 250: Performed by: RADIOLOGY

## 2019-08-27 PROCEDURE — 23350 INJECTION FOR SHOULDER X-RAY: CPT | Mod: TC

## 2019-08-27 RX ORDER — LIDOCAINE HYDROCHLORIDE 10 MG/ML
INJECTION, SOLUTION EPIDURAL; INFILTRATION; INTRACAUDAL; PERINEURAL
Status: DISPENSED
Start: 2019-08-27 | End: 2019-08-27

## 2019-08-27 RX ORDER — LIDOCAINE HYDROCHLORIDE 10 MG/ML
10 INJECTION, SOLUTION INFILTRATION; PERINEURAL ONCE
Status: COMPLETED | OUTPATIENT
Start: 2019-08-27 | End: 2019-08-27

## 2019-08-27 RX ORDER — IOPAMIDOL 612 MG/ML
30 INJECTION, SOLUTION INTRAVASCULAR ONCE
Status: COMPLETED | OUTPATIENT
Start: 2019-08-27 | End: 2019-08-27

## 2019-08-27 RX ADMIN — IOPAMIDOL 5 ML: 612 INJECTION, SOLUTION INTRAVENOUS at 10:58

## 2019-08-27 RX ADMIN — LIDOCAINE HYDROCHLORIDE 3 ML: 10 INJECTION, SOLUTION EPIDURAL; INFILTRATION; INTRACAUDAL; PERINEURAL at 10:59

## 2019-09-10 LAB
BACTERIA SPEC CULT: NO GROWTH
Lab: NORMAL
SPECIMEN SOURCE: NORMAL

## 2019-09-18 NOTE — PROGRESS NOTES
Outpatient Physical Therapy Discharge Note     Patient: Ramy Guillermo  : 1955    Beginning/End Dates of Reporting Period:  2019 to 2019    Referring Provider: Dr. Cunha    Therapy Diagnosis: gait disturbance s/p L TKA     Client Self Report: Patient states he really feels like he has made a lot of progress over the past week, states his sleeping has improved significantly in the past couple of days.  Pt is able to manage steps with step-through pattern.      Objective Measurements:  Objective Measure: AROM  Details: 4-120  Objective Measure: Strength  Details: Quad set: fair +, HS 4+/5, hip abd 4+/5          Outcome Measures (most recent score):      Goals:  Goal Identifier STG 1   Goal Description Pt to be independent and compliant with HEP.   Target Date 19   Date Met  19   Progress:     Goal Identifier LTG 1   Goal Description Pt to demonstrate improved AROM of L knee to 0-120 degrees to manage stairs without difficulty.   Target Date 19   Date Met      Progress:     Goal Identifier LTG 2   Goal Description Pt to tolerate community distance ambulation without AD without pain.   Target Date 19   Date Met  19   Progress:       Progress Toward Goals:   Progress this reporting period: Pt continues to make gains in knee flexion ROM improvement.  Pt also making in gains in strength and reporting a noticable improvement in sleep tolerance.           Plan:  Discharge from therapy.    Discharge:    Reason for Discharge: Patient has met all goals.    Equipment Issued:     Discharge Plan: Patient to continue home program.

## 2019-09-24 ENCOUNTER — HOSPITAL ENCOUNTER (INPATIENT)
Facility: HOSPITAL | Age: 64
Setting detail: SURGERY ADMIT
End: 2019-09-24
Attending: ORTHOPAEDIC SURGERY | Admitting: ORTHOPAEDIC SURGERY
Payer: COMMERCIAL

## 2019-10-02 ENCOUNTER — HEALTH MAINTENANCE LETTER (OUTPATIENT)
Age: 64
End: 2019-10-02

## 2019-10-08 ENCOUNTER — TRANSFERRED RECORDS (OUTPATIENT)
Dept: HEALTH INFORMATION MANAGEMENT | Facility: HOSPITAL | Age: 64
End: 2019-10-08

## 2019-10-09 ENCOUNTER — ANESTHESIA EVENT (OUTPATIENT)
Dept: SURGERY | Facility: HOSPITAL | Age: 64
End: 2019-10-09

## 2019-10-09 RX ORDER — ALBUTEROL SULFATE 90 UG/1
1 AEROSOL, METERED RESPIRATORY (INHALATION) EVERY 4 HOURS PRN
COMMUNITY
End: 2019-10-14 | Stop reason: HOSPADM

## 2019-10-09 RX ORDER — OMEGA-3 FATTY ACIDS/FISH OIL 300-1000MG
1000 CAPSULE ORAL 2 TIMES DAILY
COMMUNITY
End: 2019-10-14 | Stop reason: HOSPADM

## 2019-10-09 NOTE — OR NURSING
Sent to [facility]  RE:  Ramy Guillermo [birth year] is coming in on 10/16/19 for a left total shoulder arthroplasty with Dr. Guillory.  PCP is Dr. Carla Garner.  He will be staying overnight one night and home the next day with his wife, Haylie who is a [employer] R.N.  Hx of HTN, having an echo (10/10/19) and stress test done (10/14/19)  before surgery ordered by Dr. Carla Garner for ? A fib.  Ramy has had both hips and knees replaced, and right shoulder replaced.      Thank you!  NURIA Walters.  PAT/Surgery Education  [phone]

## 2019-10-09 NOTE — OR NURSING
Anesthesia notified of new Atrial fib diagnosis and request cardiology clearance.  Dr. Carla Garner office notified and Najma nurse called and message relayed.  Also notified Dr. Guillory's office that he needs cardiology clearance by leaving message with Shaneka and email at [email or web address].    Selena Mcgee RN

## 2019-10-10 ENCOUNTER — HOSPITAL ENCOUNTER (OUTPATIENT)
Dept: CARDIOLOGY | Facility: HOSPITAL | Age: 64
Discharge: HOME | End: 2019-10-10
Attending: FAMILY MEDICINE | Admitting: FAMILY MEDICINE
Payer: COMMERCIAL

## 2019-10-10 DIAGNOSIS — R94.31 ABNORMAL EKG: ICD-10-CM

## 2019-10-10 DIAGNOSIS — I48.91 NEW ONSET A-FIB (H): ICD-10-CM

## 2019-10-10 PROCEDURE — 93306 TTE W/DOPPLER COMPLETE: CPT | Mod: TC

## 2019-10-10 PROCEDURE — 93306 TTE W/DOPPLER COMPLETE: CPT | Mod: 26 | Performed by: INTERNAL MEDICINE

## 2019-10-11 ENCOUNTER — TELEPHONE (OUTPATIENT)
Dept: NUCLEAR MEDICINE | Facility: HOSPITAL | Age: 64
End: 2019-10-11

## 2019-10-11 RX ORDER — NALOXONE HYDROCHLORIDE 0.4 MG/ML
.1-.4 INJECTION, SOLUTION INTRAMUSCULAR; INTRAVENOUS; SUBCUTANEOUS
Status: CANCELLED | OUTPATIENT
Start: 2019-10-11 | End: 2019-10-12

## 2019-10-11 RX ORDER — ONDANSETRON 4 MG/1
4 TABLET, ORALLY DISINTEGRATING ORAL EVERY 30 MIN PRN
Status: CANCELLED | OUTPATIENT
Start: 2019-10-11

## 2019-10-11 RX ORDER — SODIUM CHLORIDE, SODIUM LACTATE, POTASSIUM CHLORIDE, CALCIUM CHLORIDE 600; 310; 30; 20 MG/100ML; MG/100ML; MG/100ML; MG/100ML
INJECTION, SOLUTION INTRAVENOUS CONTINUOUS
Status: CANCELLED | OUTPATIENT
Start: 2019-10-11

## 2019-10-11 RX ORDER — DEXAMETHASONE SODIUM PHOSPHATE 4 MG/ML
4 INJECTION, SOLUTION INTRA-ARTICULAR; INTRALESIONAL; INTRAMUSCULAR; INTRAVENOUS; SOFT TISSUE
Status: CANCELLED | OUTPATIENT
Start: 2019-10-11

## 2019-10-11 RX ORDER — FENTANYL CITRATE 50 UG/ML
25-50 INJECTION, SOLUTION INTRAMUSCULAR; INTRAVENOUS
Status: CANCELLED | OUTPATIENT
Start: 2019-10-11

## 2019-10-11 RX ORDER — HYDROMORPHONE HYDROCHLORIDE 1 MG/ML
.3-.5 INJECTION, SOLUTION INTRAMUSCULAR; INTRAVENOUS; SUBCUTANEOUS EVERY 5 MIN PRN
Status: CANCELLED | OUTPATIENT
Start: 2019-10-11

## 2019-10-11 RX ORDER — MEPERIDINE HYDROCHLORIDE 25 MG/ML
12.5 INJECTION INTRAMUSCULAR; INTRAVENOUS; SUBCUTANEOUS EVERY 5 MIN PRN
Status: CANCELLED | OUTPATIENT
Start: 2019-10-11

## 2019-10-11 RX ORDER — LABETALOL 20 MG/4 ML (5 MG/ML) INTRAVENOUS SYRINGE
10
Status: CANCELLED | OUTPATIENT
Start: 2019-10-11

## 2019-10-11 RX ORDER — LIDOCAINE 40 MG/G
CREAM TOPICAL
Status: CANCELLED | OUTPATIENT
Start: 2019-10-11

## 2019-10-11 RX ORDER — ONDANSETRON 2 MG/ML
4 INJECTION INTRAMUSCULAR; INTRAVENOUS EVERY 30 MIN PRN
Status: CANCELLED | OUTPATIENT
Start: 2019-10-11

## 2019-10-11 ASSESSMENT — ENCOUNTER SYMPTOMS: DYSRHYTHMIAS: 1

## 2019-10-11 ASSESSMENT — LIFESTYLE VARIABLES: TOBACCO_USE: 1

## 2019-10-11 NOTE — TELEPHONE ENCOUNTER
Appointment Reminder Call    Spoke with patient at length.    -Exam prep  -When and where to register  -Appointment length.  Recommended book, cell phone, crossword puzzle.  - Explained entire test in length  -Discussed leaving arm(s) down for images due to shoulder pain  -Discussed upcoming appointment with Cardiologist at [facility] and getting results to this provider.

## 2019-10-11 NOTE — ANESTHESIA PREPROCEDURE EVALUATION
Anesthesia Pre-Procedure Evaluation    Patient: Ramy Guillermo   MRN: [id number] : 1955          Preoperative Diagnosis: Left shoulder pain, unspecified chronicity [M25.512]    Procedure(s):  LEFT TOTAL SHOULDER ARTHROPLASTY    Past Medical History:   Diagnosis Date     Allergic rhinitis      Certain adverse effects, not elsewhere classified, other     required more sedation preop R vein ablation     Colon adenoma 8/3/2012    colonoscopy due 2017      DJD (degenerative joint disease) of lumbar spine     L4-L5 & L5-S1     Duodenal ulcer, unspecified as acute or chronic, without hemorrhage, perforation, or obstruction      Essential hypertension, benign      GERD (gastroesophageal reflux disease)      PONV (postoperative nausea and vomiting)      Past Surgical History:   Procedure Laterality Date     ABLATE VEIN VARICOSE RADIO FREQUENCY WITH PHLEBECTOMY MULTIPLE STAB  10/7/11, 11    RT, LT Dr. Cruz Robert     ABLATE VEIN VARICOSE RADIO FREQUENCY WITH PHLEBECTOMY MULTIPLE STAB  10/7/2011    Procedure:ABLATE VEIN VARICOSE RADIO FREQUENCY WITH PHLEBECTOMY MULTIPLE STAB; Right Radio Frenquency with Greater Saphenous with  Phlebectomy Multiple Stab; Surgeon:CRUZ ROBERT; [facility]     ABLATE VEIN VARICOSE RADIO FREQUENCY WITH PHLEBECTOMY MULTIPLE STAB  2011    Procedure:ABLATE VEIN VARICOSE RADIO FREQUENCY WITH PHLEBECTOMY MULTIPLE STAB; Left Radio Frenquency with Greater Saphenous Vein with  Bilateral Phlebectomy Multiple Stab Varicose Veins.; Surgeon:CRUZ ROBERT; [facility]     ABLATE VEIN VARICOSE RADIO FREQUENCY WITH PHLEBECTOMY MULTIPLE STAB  2012    Procedure: ABLATE VEIN VARICOSE RADIO FREQUENCY WITH PHLEBECTOMY MULTIPLE STAB;   Bilateral stab Micro- phlebectomies;  Surgeon: Dany Mascorro MD;  Location: [facility]     ARTHROPLASTY KNEE Left 2019    Procedure: LEFT TOTAL KNEE ARTHROPLASTY/PERSONA;  Surgeon: Lamonte Cunha MD;  [facility]: [facility]     ARTHROSCOPY  KNEE  3/1/2013    Procedure: ARTHROSCOPY KNEE;  Right knee arthoscopy w/ medial menisectomy and chondroplasty;  Surgeon: Abiodun Jacobson MD;  Location: [facility]     C EXCIS BOWEL LESION(S),SINGLE ENTEROTOMY  1968    small intestine     C RECONSTR TOTAL SHOULDER IMPLANT Right 10/24/2017     COLONOSCOPY N/A 8/30/2016    Procedure: COLONOSCOPY;  Surgeon: Calos Post MD;  [facility]: [facility]     COLONOSCOPY N/A 12/8/2017    Procedure: COLONOSCOPY;  COLONOSCOPY;  Surgeon: Calos Post MD;  [facility]     FLEXIBLE SIGMOIDOSCOPY  1/9/07     HC COLONOSCOPY W BIOPSY  8/3/12    proximal ascending, tubular adenoma     HC EXCISION OF UVULA  2001    for snoring     HC KNEE SCOPE,MED/LAT MENISECTOMY  7/11/05    RT + chondroplasty of medial femoral condyl & patellofemoral joint, Dr. Verma     HC PARTIAL REMOVAL/REPAIR,ACROMION  12/20/13    Left, Sachin     HC REPAIR BICEPS LONG TENDON  12/20/13    Left     HERNIA REPAIR, INGUINAL RT/LT  01/10/08    LT w/onlay mesh patch, Dr. Mchugh     JOINT REPLACEMENT, HIP RT/LT  9/06    RT hip RESURFACING arthroplasty, Dr. Ra Valdivia @[facility]       Anesthesia Evaluation     . Pt has had prior anesthetic.     History of anesthetic complications   - PONV        ROS/MED HX    ENT/Pulmonary:     (+)allergic rhinitis, tobacco use, Past use , . .    Neurologic:       Cardiovascular:     (+) Dyslipidemia, hypertension----. : . . . :. dysrhythmias a-fib, Irregular Heartbeat/Palpitations, . Previous cardiac testing date:results:date: results:ECG reviewed date:10-8-19 results:Atrial fib date: results:          METS/Exercise Tolerance:     Hematologic:         Musculoskeletal: Comment: Lumbar spondylosis and cervical disc disease and osteoarthritis and S/P right TKA  (+) arthritis,  -       GI/Hepatic: Comment: LPR    (+) GERD       Renal/Genitourinary: Comment: Urge incontinence        Endo:         Psychiatric:         Infectious Disease:         Malignancy:         Other:                            "      Lab Results   Component Value Date    WBC 9.1 04/13/2019    HGB 12.3 (L) 04/13/2019    HCT 37.6 (L) 09/19/2017     09/19/2017    SED 7 09/19/2017     09/19/2017    POTASSIUM 4.4 01/29/2019    CHLORIDE 106 09/19/2017    CO2 27 09/19/2017    BUN 25 09/19/2017    CR 0.99 01/29/2019     (H) 04/12/2019    RACHEL 9.1 09/19/2017    ALBUMIN 3.7 09/19/2017    PROTTOTAL 7.1 09/19/2017    ALT 19 01/29/2019    AST 23 01/29/2019    ALKPHOS 102 09/19/2017    BILITOTAL 0.5 09/19/2017    INR 0.96 02/26/2013    TSH 1.35 09/19/2017       Preop Vitals  BP Readings from Last 3 Encounters:   04/13/19 171/87   09/04/18 (!) 203/141   12/08/17 167/98    Pulse Readings from Last 3 Encounters:   04/12/19 72   09/04/18 80   12/08/17 96      Resp Readings from Last 3 Encounters:   04/13/19 16   09/04/18 13   12/08/17 18    SpO2 Readings from Last 3 Encounters:   04/13/19 95%   09/04/18 97%   12/08/17 99%      Temp Readings from Last 1 Encounters:   04/13/19 98.3  F (36.8  C) (Tympanic)    Ht Readings from Last 1 Encounters:   04/11/19 1.803 m (5' 11\")      Wt Readings from Last 1 Encounters:   04/11/19 107.1 kg (236 lb 1.8 oz)    Estimated body mass index is 32.93 kg/m  as calculated from the following:    Height as of 4/11/19: 1.803 m (5' 11\").    Weight as of 4/11/19: 107.1 kg (236 lb 1.8 oz).       Anesthesia Plan          Plan for General and ETT with Propofol induction. Maintenance will be Balanced.    PONV prophylaxis:  Ondansetron (or other 5HT-3) and Dexamethasone or Solumedrol  Needs heart tones and echocardiogram and stress test pending.  His atrial fib is new onset      Postoperative Care      Consents                 Balwinder Thomas, APRN CRNA  "

## 2019-10-14 ENCOUNTER — HOSPITAL ENCOUNTER (OUTPATIENT)
Dept: CARDIOLOGY | Facility: HOSPITAL | Age: 64
Setting detail: NUCLEAR MEDICINE
End: 2019-10-14
Attending: FAMILY MEDICINE
Payer: COMMERCIAL

## 2019-10-14 ENCOUNTER — HOSPITAL ENCOUNTER (OUTPATIENT)
Dept: NUCLEAR MEDICINE | Facility: HOSPITAL | Age: 64
Setting detail: NUCLEAR MEDICINE
Discharge: HOME | End: 2019-10-14
Attending: FAMILY MEDICINE | Admitting: FAMILY MEDICINE
Payer: COMMERCIAL

## 2019-10-14 ENCOUNTER — HOSPITAL ENCOUNTER (OUTPATIENT)
Dept: NUCLEAR MEDICINE | Facility: HOSPITAL | Age: 64
Setting detail: NUCLEAR MEDICINE
End: 2019-10-14
Attending: FAMILY MEDICINE
Payer: COMMERCIAL

## 2019-10-14 DIAGNOSIS — I48.91 NEW ONSET A-FIB (H): ICD-10-CM

## 2019-10-14 DIAGNOSIS — R94.31 ABNORMAL EKG: ICD-10-CM

## 2019-10-14 PROCEDURE — 93018 CV STRESS TEST I&R ONLY: CPT | Performed by: INTERNAL MEDICINE

## 2019-10-14 PROCEDURE — 34300033 ZZH RX 343: Performed by: RADIOLOGY

## 2019-10-14 PROCEDURE — A9500 TC99M SESTAMIBI: HCPCS | Performed by: RADIOLOGY

## 2019-10-14 PROCEDURE — 93017 CV STRESS TEST TRACING ONLY: CPT | Performed by: INTERNAL MEDICINE

## 2019-10-14 PROCEDURE — 25800030 ZZH RX IP 258 OP 636: Performed by: INTERNAL MEDICINE

## 2019-10-14 PROCEDURE — 93016 CV STRESS TEST SUPVJ ONLY: CPT | Performed by: INTERNAL MEDICINE

## 2019-10-14 PROCEDURE — 78452 HT MUSCLE IMAGE SPECT MULT: CPT | Mod: TC

## 2019-10-14 RX ORDER — SODIUM CHLORIDE 9 MG/ML
INJECTION, SOLUTION INTRAVENOUS ONCE
Status: COMPLETED | OUTPATIENT
Start: 2019-10-14 | End: 2019-10-14

## 2019-10-14 RX ADMIN — Medication 30 MILLICURIE: at 09:14

## 2019-10-14 RX ADMIN — SODIUM CHLORIDE: 9 INJECTION, SOLUTION INTRAVENOUS at 09:02

## 2019-10-14 RX ADMIN — Medication 10 MILLICURIE: at 07:35

## 2019-10-16 ENCOUNTER — ANESTHESIA (OUTPATIENT)
Dept: SURGERY | Facility: HOSPITAL | Age: 64
End: 2019-10-16

## 2019-10-17 ENCOUNTER — TRANSFERRED RECORDS (OUTPATIENT)
Dept: HEALTH INFORMATION MANAGEMENT | Facility: HOSPITAL | Age: 64
End: 2019-10-17

## 2019-11-07 ENCOUNTER — TRANSFERRED RECORDS (OUTPATIENT)
Dept: HEALTH INFORMATION MANAGEMENT | Facility: HOSPITAL | Age: 64
End: 2019-11-07

## 2019-11-08 ENCOUNTER — ANESTHESIA EVENT (OUTPATIENT)
Dept: SURGERY | Facility: HOSPITAL | Age: 64
DRG: 483 | End: 2019-11-08
Payer: COMMERCIAL

## 2019-11-08 RX ORDER — FENTANYL CITRATE 50 UG/ML
25-50 INJECTION, SOLUTION INTRAMUSCULAR; INTRAVENOUS
Status: CANCELLED | OUTPATIENT
Start: 2019-11-08

## 2019-11-08 ASSESSMENT — ENCOUNTER SYMPTOMS: DYSRHYTHMIAS: 1

## 2019-11-08 ASSESSMENT — LIFESTYLE VARIABLES: TOBACCO_USE: 1

## 2019-11-08 NOTE — ANESTHESIA PREPROCEDURE EVALUATION
Anesthesia Pre-Procedure Evaluation    Patient: Ramy Guillermo   MRN: [id number] : 1955          Preoperative Diagnosis: Left shoulder pain, unspecified chronicity [M25.512]    Procedure(s):  LEFT TOTAL SHOULDER ARTHROPLASTY    Past Medical History:   Diagnosis Date     Allergic rhinitis      Certain adverse effects, not elsewhere classified, other     required more sedation preop R vein ablation     Colon adenoma 8/3/2012    colonoscopy due 2017      DJD (degenerative joint disease) of lumbar spine     L4-L5 & L5-S1     Duodenal ulcer, unspecified as acute or chronic, without hemorrhage, perforation, or obstruction      Essential hypertension, benign      GERD (gastroesophageal reflux disease)      PONV (postoperative nausea and vomiting)      Past Surgical History:   Procedure Laterality Date     ABLATE VEIN VARICOSE RADIO FREQUENCY WITH PHLEBECTOMY MULTIPLE STAB  10/7/11, 11    RT, LT Dr. Cruz Robert     ABLATE VEIN VARICOSE RADIO FREQUENCY WITH PHLEBECTOMY MULTIPLE STAB  10/7/2011    Procedure:ABLATE VEIN VARICOSE RADIO FREQUENCY WITH PHLEBECTOMY MULTIPLE STAB; Right Radio Frenquency with Greater Saphenous with  Phlebectomy Multiple Stab; Surgeon:CRUZ ROBERT; [facility]     ABLATE VEIN VARICOSE RADIO FREQUENCY WITH PHLEBECTOMY MULTIPLE STAB  2011    Procedure:ABLATE VEIN VARICOSE RADIO FREQUENCY WITH PHLEBECTOMY MULTIPLE STAB; Left Radio Frenquency with Greater Saphenous Vein with  Bilateral Phlebectomy Multiple Stab Varicose Veins.; Surgeon:CRUZ ROBERT; [facility]     ABLATE VEIN VARICOSE RADIO FREQUENCY WITH PHLEBECTOMY MULTIPLE STAB  2012    Procedure: ABLATE VEIN VARICOSE RADIO FREQUENCY WITH PHLEBECTOMY MULTIPLE STAB;   Bilateral stab Micro- phlebectomies;  Surgeon: Dany Mascorro MD;  [facility]: [facility]     ARTHROPLASTY KNEE Left 2019    Procedure: LEFT TOTAL KNEE ARTHROPLASTY/PERSONA;  Surgeon: Lamonte Cunha MD;  [facility]     ARTHROSCOPY  KNEE  3/1/2013    Procedure: ARTHROSCOPY KNEE;  Right knee arthoscopy w/ medial menisectomy and chondroplasty;  Surgeon: Abiodun Jacobson MD;  [facility]     C EXCIS BOWEL LESION(S),SINGLE ENTEROTOMY  1968    small intestine     C RECONSTR TOTAL SHOULDER IMPLANT Right 10/24/2017     COLONOSCOPY N/A 8/30/2016    Procedure: COLONOSCOPY;  Surgeon: Calos Post MD;  [facility]     COLONOSCOPY N/A 12/8/2017    Procedure: COLONOSCOPY;  COLONOSCOPY;  Surgeon: Calos Post MD;  [facility]     FLEXIBLE SIGMOIDOSCOPY  1/9/07     HC COLONOSCOPY W BIOPSY  8/3/12    proximal ascending, tubular adenoma     HC EXCISION OF UVULA  2001    for snoring     HC KNEE SCOPE,MED/LAT MENISECTOMY  7/11/05    RT + chondroplasty of medial femoral condyl & patellofemoral joint, Dr. Verma     HC PARTIAL REMOVAL/REPAIR,ACROMION  12/20/13    Left, Sachin     HC REPAIR BICEPS LONG TENDON  12/20/13    Left     HERNIA REPAIR, INGUINAL RT/LT  01/10/08    LT w/onlay mesh patch, Dr. Mchugh     JOINT REPLACEMENT, HIP RT/LT  9/06    RT hip RESURFACING arthroplasty, Dr. Ra Valdivia @[facility]       Anesthesia Evaluation     . Pt has had prior anesthetic. Type: General    History of anesthetic complications   - PONV        ROS/MED HX    ENT/Pulmonary:     (+)allergic rhinitis, tobacco use, Past use , . .    Neurologic:       Cardiovascular: Comment: Patient previously cancelled for procedure due to recent new onset persistent atrial fibrillation. Per recent cardiology note (10/17/2019), patient is in persistent atrial fibrillation with controlled ventricular rate without AV blocking agents. Recent echo showed normal LV function. Per cardiology note, patient to be anticoagulated after surgery, and plan for cardioversion.  Per Cardiology Note: Avoid volume overload    (+) Dyslipidemia, hypertension----. : . . . :. dysrhythmias a-fib, Irregular Heartbeat/Palpitations, . Previous cardiac testing Echodate:10/14/2019results:EF 60-65%Stress  Testdate:10/15/2019 results:This is an exercise Cardiolite study on patient Ramy Guillermo ordered by  Dr. Melania Dubois for new onset A. fib.     The patient was placed upon the treadmill using the [place] protocol.  Went for a total time of 7 minutes into stage III at which time  discontinued secondary to fatigue. Resting heart was 70 tayla to 185.  Resting blood pressure 136/82 went to 185/85 for a double product of  30,000. He achieved 7 METs and 118% of maximum. Heart rate.     Review electrocardiogram shows the patient has atrial fibrillation  throughout the entire study. Baseline he is some nonspecific minor  ST-T changes in leads II, III, aVF and V5 and V6. He does develop some  increased ST depression in the same leads actually 1.5 to 2 mm in  leads II, III, aVF. Also has 1 mm in leads V5 and V6. Does have an  occasional PVC couplet.     ASSESSMENT: Exercise Cardiolite study which the patient was limited by  fatigue. Objectively adequate double product. Does develop A. Fib RVR.  Does have some ST depression in the inferior leads II, III, aVF and V5  and V6 suggestive of ischemia but the baseline presence of  abnormalities make it somewhat equivocal for ischemia. The Cardiolite  scans are pending.ECG reviewed date:10/17/2019 results:Atrial fibrillation with slow ventricular response (v rate 59) date: results:          METS/Exercise Tolerance:     Hematologic:         Musculoskeletal: Comment: Lumbar spondylosis and cervical disc disease and osteoarthritis and S/P right TKA  (+) arthritis,  -       GI/Hepatic: Comment: LPR    (+) GERD       Renal/Genitourinary: Comment: Urge incontinence        Endo:         Psychiatric:         Infectious Disease:         Malignancy:         Other:    - neg other ROS                        Physical Exam  Normal systems: cardiovascular, pulmonary and dental    Airway   Mallampati: I  TM distance: >3 FB  Neck ROM: full    Dental     Cardiovascular       Pulmonary  "            Lab Results   Component Value Date    WBC 9.1 04/13/2019    HGB 12.3 (L) 04/13/2019    HCT 37.6 (L) 09/19/2017     09/19/2017    SED 7 09/19/2017     09/19/2017    POTASSIUM 4.4 01/29/2019    CHLORIDE 106 09/19/2017    CO2 27 09/19/2017    BUN 25 09/19/2017    CR 0.99 01/29/2019     (H) 04/12/2019    RACHEL 9.1 09/19/2017    ALBUMIN 3.7 09/19/2017    PROTTOTAL 7.1 09/19/2017    ALT 19 01/29/2019    AST 23 01/29/2019    ALKPHOS 102 09/19/2017    BILITOTAL 0.5 09/19/2017    INR 0.96 02/26/2013    TSH 1.35 09/19/2017       Preop Vitals  BP Readings from Last 3 Encounters:   04/13/19 171/87   09/04/18 (!) 203/141   12/08/17 167/98    Pulse Readings from Last 3 Encounters:   04/12/19 72   09/04/18 80   12/08/17 96      Resp Readings from Last 3 Encounters:   04/13/19 16   09/04/18 13   12/08/17 18    SpO2 Readings from Last 3 Encounters:   04/13/19 95%   09/04/18 97%   12/08/17 99%      Temp Readings from Last 1 Encounters:   04/13/19 98.3  F (36.8  C) (Tympanic)    Ht Readings from Last 1 Encounters:   04/11/19 1.803 m (5' 11\")      Wt Readings from Last 1 Encounters:   04/11/19 107.1 kg (236 lb 1.8 oz)    Estimated body mass index is 32.93 kg/m  as calculated from the following:    Height as of 4/11/19: 1.803 m (5' 11\").    Weight as of 4/11/19: 107.1 kg (236 lb 1.8 oz).       Anesthesia Plan      History & Physical Review  History and physical reviewed and following examination; no interval change.    ASA Status:  3 .    NPO Status:  > 8 hours    Plan for General, Peripheral Nerve Block, For Post-op pain in coordination with surgeon and LMA with Propofol induction. Maintenance will be Balanced.    PONV prophylaxis:  Ondansetron (or other 5HT-3) and Dexamethasone or Solumedrol  Cardiology note 10/17   H&P 11-11-19      Postoperative Care  Postoperative pain management:  Peripheral nerve block (Single Shot).      Consents  Anesthetic plan, risks, benefits and alternatives discussed with:  " Patient..                   ABRAHAM Hughes CRNA

## 2019-11-12 NOTE — OR NURSING
RE:  Ramy Guillermo [birth year] is coming in on 11/13/19 for a left total shoulder arthroplasty with Dr. Guillory.  PCP is Dr. Carla Garner.  He has cardiology clearance done for new Atrial Fib diagnosis.  Ramy has had both hips and knees replaced along with the right shoulder.  His wife Haylie is a SDS/PACU RN.  His goal is to go home the next day.    Selena Mcgee RN  [address]/ [place]  [address]  [email or web address]  Office: [phone]

## 2019-11-13 ENCOUNTER — ANESTHESIA (OUTPATIENT)
Dept: SURGERY | Facility: HOSPITAL | Age: 64
DRG: 483 | End: 2019-11-13
Payer: COMMERCIAL

## 2019-11-13 ENCOUNTER — APPOINTMENT (OUTPATIENT)
Dept: GENERAL RADIOLOGY | Facility: HOSPITAL | Age: 64
DRG: 483 | End: 2019-11-13
Attending: ORTHOPAEDIC SURGERY
Payer: COMMERCIAL

## 2019-11-13 ENCOUNTER — HOSPITAL ENCOUNTER (INPATIENT)
Facility: HOSPITAL | Age: 64
LOS: 2 days | Discharge: HOME | DRG: 483 | End: 2019-11-15
Attending: ORTHOPAEDIC SURGERY | Admitting: ORTHOPAEDIC SURGERY
Payer: COMMERCIAL

## 2019-11-13 DIAGNOSIS — Z96.612 S/P SHOULDER REPLACEMENT, LEFT: Primary | ICD-10-CM

## 2019-11-13 DIAGNOSIS — Z96.652 STATUS POST TOTAL LEFT KNEE REPLACEMENT: ICD-10-CM

## 2019-11-13 PROCEDURE — 99231 SBSQ HOSP IP/OBS SF/LOW 25: CPT | Mod: 25 | Performed by: NURSE PRACTITIONER

## 2019-11-13 PROCEDURE — 25000132 ZZH RX MED GY IP 250 OP 250 PS 637: Performed by: ORTHOPAEDIC SURGERY

## 2019-11-13 PROCEDURE — 36000063 ZZH SURGERY LEVEL 4 EA 15 ADDTL MIN: Performed by: ORTHOPAEDIC SURGERY

## 2019-11-13 PROCEDURE — 37000009 ZZH ANESTHESIA TECHNICAL FEE, EACH ADDTL 15 MIN: Performed by: ORTHOPAEDIC SURGERY

## 2019-11-13 PROCEDURE — 3E0T3BZ INTRODUCTION OF ANESTHETIC AGENT INTO PERIPHERAL NERVES AND PLEXI, PERCUTANEOUS APPROACH: ICD-10-PCS | Performed by: NURSE ANESTHETIST, CERTIFIED REGISTERED

## 2019-11-13 PROCEDURE — 25000128 H RX IP 250 OP 636: Performed by: NURSE ANESTHETIST, CERTIFIED REGISTERED

## 2019-11-13 PROCEDURE — 27210794 ZZH OR GENERAL SUPPLY STERILE: Performed by: ORTHOPAEDIC SURGERY

## 2019-11-13 PROCEDURE — 37000008 ZZH ANESTHESIA TECHNICAL FEE, 1ST 30 MIN: Performed by: ORTHOPAEDIC SURGERY

## 2019-11-13 PROCEDURE — 25800030 ZZH RX IP 258 OP 636: Performed by: ORTHOPAEDIC SURGERY

## 2019-11-13 PROCEDURE — 40000986 XR SHOULDER LT PORT G/E 2 VW: Mod: TC,LT

## 2019-11-13 PROCEDURE — 71000015 ZZH RECOVERY PHASE 1 LEVEL 2 EA ADDTL HR: Performed by: ORTHOPAEDIC SURGERY

## 2019-11-13 PROCEDURE — 25000125 ZZHC RX 250: Performed by: ORTHOPAEDIC SURGERY

## 2019-11-13 PROCEDURE — 23472 RECONSTRUCT SHOULDER JOINT: CPT | Performed by: NURSE ANESTHETIST, CERTIFIED REGISTERED

## 2019-11-13 PROCEDURE — 25000128 H RX IP 250 OP 636: Performed by: ORTHOPAEDIC SURGERY

## 2019-11-13 PROCEDURE — 27810169 ZZH OR IMPLANT GENERAL: Performed by: ORTHOPAEDIC SURGERY

## 2019-11-13 PROCEDURE — 64415 NJX AA&/STRD BRCH PLXS IMG: CPT | Mod: 59 | Performed by: NURSE ANESTHETIST, CERTIFIED REGISTERED

## 2019-11-13 PROCEDURE — 36000093 ZZH SURGERY LEVEL 4 1ST 30 MIN: Performed by: ORTHOPAEDIC SURGERY

## 2019-11-13 PROCEDURE — 25000125 ZZHC RX 250: Performed by: NURSE ANESTHETIST, CERTIFIED REGISTERED

## 2019-11-13 PROCEDURE — 40000305 ZZH STATISTIC PRE PROC ASSESS I: Performed by: ORTHOPAEDIC SURGERY

## 2019-11-13 PROCEDURE — 88300 SURGICAL PATH GROSS: CPT | Mod: TC | Performed by: ORTHOPAEDIC SURGERY

## 2019-11-13 PROCEDURE — 27110028 ZZH OR GENERAL SUPPLY NON-STERILE: Performed by: ORTHOPAEDIC SURGERY

## 2019-11-13 PROCEDURE — 12000000 ZZH R&B MED SURG/OB

## 2019-11-13 PROCEDURE — 25000128 H RX IP 250 OP 636

## 2019-11-13 PROCEDURE — C1776 JOINT DEVICE (IMPLANTABLE): HCPCS | Performed by: ORTHOPAEDIC SURGERY

## 2019-11-13 PROCEDURE — 25800030 ZZH RX IP 258 OP 636: Performed by: NURSE ANESTHETIST, CERTIFIED REGISTERED

## 2019-11-13 PROCEDURE — 71000014 ZZH RECOVERY PHASE 1 LEVEL 2 FIRST HR: Performed by: ORTHOPAEDIC SURGERY

## 2019-11-13 PROCEDURE — 76942 ECHO GUIDE FOR BIOPSY: CPT | Mod: 26 | Performed by: NURSE ANESTHETIST, CERTIFIED REGISTERED

## 2019-11-13 PROCEDURE — 0RRK0JZ REPLACEMENT OF LEFT SHOULDER JOINT WITH SYNTHETIC SUBSTITUTE, OPEN APPROACH: ICD-10-PCS | Performed by: ORTHOPAEDIC SURGERY

## 2019-11-13 PROCEDURE — C9290 INJ, BUPIVACAINE LIPOSOME: HCPCS | Performed by: ORTHOPAEDIC SURGERY

## 2019-11-13 DEVICE — BONE CEMENT-SIMPLEX: Type: IMPLANTABLE DEVICE | Site: SHOULDER | Status: FUNCTIONAL

## 2019-11-13 DEVICE — IMPLANTABLE DEVICE: Type: IMPLANTABLE DEVICE | Site: SHOULDER | Status: FUNCTIONAL

## 2019-11-13 RX ORDER — ALBUTEROL SULFATE 90 UG/1
1 AEROSOL, METERED RESPIRATORY (INHALATION) EVERY 4 HOURS PRN
Refills: 0 | COMMUNITY
Start: 2019-10-01

## 2019-11-13 RX ORDER — LIDOCAINE 40 MG/G
CREAM TOPICAL
Status: DISCONTINUED | OUTPATIENT
Start: 2019-11-13 | End: 2019-11-13 | Stop reason: HOSPADM

## 2019-11-13 RX ORDER — DEXAMETHASONE SODIUM PHOSPHATE 10 MG/ML
INJECTION, SOLUTION INTRAMUSCULAR; INTRAVENOUS PRN
Status: DISCONTINUED | OUTPATIENT
Start: 2019-11-13 | End: 2019-11-13

## 2019-11-13 RX ORDER — OXYCODONE HYDROCHLORIDE 5 MG/1
5-10 TABLET ORAL
Status: DISCONTINUED | OUTPATIENT
Start: 2019-11-13 | End: 2019-11-15 | Stop reason: HOSPADM

## 2019-11-13 RX ORDER — ACETAMINOPHEN 325 MG/1
650 TABLET ORAL EVERY 4 HOURS PRN
Status: DISCONTINUED | OUTPATIENT
Start: 2019-11-13 | End: 2019-11-15 | Stop reason: HOSPADM

## 2019-11-13 RX ORDER — OXYBUTYNIN CHLORIDE 5 MG/1
5 TABLET ORAL DAILY
Status: DISCONTINUED | OUTPATIENT
Start: 2019-11-13 | End: 2019-11-13

## 2019-11-13 RX ORDER — ONDANSETRON 4 MG/1
4 TABLET, ORALLY DISINTEGRATING ORAL EVERY 30 MIN PRN
Status: DISCONTINUED | OUTPATIENT
Start: 2019-11-13 | End: 2019-11-13 | Stop reason: HOSPADM

## 2019-11-13 RX ORDER — KETAMINE HYDROCHLORIDE 10 MG/ML
INJECTION INTRAMUSCULAR; INTRAVENOUS PRN
Status: DISCONTINUED | OUTPATIENT
Start: 2019-11-13 | End: 2019-11-13

## 2019-11-13 RX ORDER — LISINOPRIL 20 MG/1
20 TABLET ORAL DAILY
Status: DISCONTINUED | OUTPATIENT
Start: 2019-11-13 | End: 2019-11-15 | Stop reason: HOSPADM

## 2019-11-13 RX ORDER — SODIUM CHLORIDE, SODIUM LACTATE, POTASSIUM CHLORIDE, CALCIUM CHLORIDE 600; 310; 30; 20 MG/100ML; MG/100ML; MG/100ML; MG/100ML
INJECTION, SOLUTION INTRAVENOUS CONTINUOUS
Status: DISCONTINUED | OUTPATIENT
Start: 2019-11-13 | End: 2019-11-13 | Stop reason: HOSPADM

## 2019-11-13 RX ORDER — HYDROMORPHONE HYDROCHLORIDE 1 MG/ML
.3-.5 INJECTION, SOLUTION INTRAMUSCULAR; INTRAVENOUS; SUBCUTANEOUS EVERY 10 MIN PRN
Status: DISCONTINUED | OUTPATIENT
Start: 2019-11-13 | End: 2019-11-13 | Stop reason: HOSPADM

## 2019-11-13 RX ORDER — OXYBUTYNIN CHLORIDE 5 MG/1
10 TABLET ORAL DAILY
Status: DISCONTINUED | OUTPATIENT
Start: 2019-11-13 | End: 2019-11-13

## 2019-11-13 RX ORDER — OXYBUTYNIN CHLORIDE 5 MG/1
5 TABLET ORAL DAILY
Status: DISCONTINUED | OUTPATIENT
Start: 2019-11-14 | End: 2019-11-15 | Stop reason: HOSPADM

## 2019-11-13 RX ORDER — NALOXONE HYDROCHLORIDE 0.4 MG/ML
.1-.4 INJECTION, SOLUTION INTRAMUSCULAR; INTRAVENOUS; SUBCUTANEOUS
Status: DISCONTINUED | OUTPATIENT
Start: 2019-11-13 | End: 2019-11-13 | Stop reason: HOSPADM

## 2019-11-13 RX ORDER — FERROUS SULFATE 324(65)MG
324 TABLET, DELAYED RELEASE (ENTERIC COATED) ORAL DAILY
Status: ON HOLD | COMMUNITY
End: 2019-11-14

## 2019-11-13 RX ORDER — BUPIVACAINE HYDROCHLORIDE AND EPINEPHRINE 2.5; 5 MG/ML; UG/ML
INJECTION, SOLUTION EPIDURAL; INFILTRATION; INTRACAUDAL; PERINEURAL PRN
Status: DISCONTINUED | OUTPATIENT
Start: 2019-11-13 | End: 2019-11-13 | Stop reason: HOSPADM

## 2019-11-13 RX ORDER — SODIUM CHLORIDE 9 MG/ML
INJECTION, SOLUTION INTRAVENOUS CONTINUOUS
Status: DISCONTINUED | OUTPATIENT
Start: 2019-11-13 | End: 2019-11-14

## 2019-11-13 RX ORDER — ONDANSETRON 2 MG/ML
4 INJECTION INTRAMUSCULAR; INTRAVENOUS EVERY 6 HOURS PRN
Status: DISCONTINUED | OUTPATIENT
Start: 2019-11-13 | End: 2019-11-15 | Stop reason: HOSPADM

## 2019-11-13 RX ORDER — FENTANYL CITRATE 50 UG/ML
25-50 INJECTION, SOLUTION INTRAMUSCULAR; INTRAVENOUS EVERY 5 MIN PRN
Status: DISCONTINUED | OUTPATIENT
Start: 2019-11-13 | End: 2019-11-13 | Stop reason: HOSPADM

## 2019-11-13 RX ORDER — CEFAZOLIN SODIUM 2 G/100ML
2 INJECTION, SOLUTION INTRAVENOUS EVERY 8 HOURS
Status: COMPLETED | OUTPATIENT
Start: 2019-11-13 | End: 2019-11-14

## 2019-11-13 RX ORDER — NALOXONE HYDROCHLORIDE 0.4 MG/ML
.1-.4 INJECTION, SOLUTION INTRAMUSCULAR; INTRAVENOUS; SUBCUTANEOUS
Status: DISCONTINUED | OUTPATIENT
Start: 2019-11-13 | End: 2019-11-15 | Stop reason: HOSPADM

## 2019-11-13 RX ORDER — LIDOCAINE 40 MG/G
CREAM TOPICAL
Status: DISCONTINUED | OUTPATIENT
Start: 2019-11-13 | End: 2019-11-15 | Stop reason: HOSPADM

## 2019-11-13 RX ORDER — ONDANSETRON 2 MG/ML
4 INJECTION INTRAMUSCULAR; INTRAVENOUS EVERY 30 MIN PRN
Status: DISCONTINUED | OUTPATIENT
Start: 2019-11-13 | End: 2019-11-13 | Stop reason: HOSPADM

## 2019-11-13 RX ORDER — CEFAZOLIN SODIUM 2 G/100ML
2 INJECTION, SOLUTION INTRAVENOUS
Status: COMPLETED | OUTPATIENT
Start: 2019-11-13 | End: 2019-11-13

## 2019-11-13 RX ORDER — SWAB
1 SWAB, NON-MEDICATED MISCELLANEOUS DAILY
COMMUNITY

## 2019-11-13 RX ORDER — MEPERIDINE HYDROCHLORIDE 50 MG/ML
12.5 INJECTION INTRAMUSCULAR; INTRAVENOUS; SUBCUTANEOUS
Status: DISCONTINUED | OUTPATIENT
Start: 2019-11-13 | End: 2019-11-13 | Stop reason: HOSPADM

## 2019-11-13 RX ORDER — ROPIVACAINE HYDROCHLORIDE 5 MG/ML
INJECTION, SOLUTION EPIDURAL; INFILTRATION; PERINEURAL PRN
Status: DISCONTINUED | OUTPATIENT
Start: 2019-11-13 | End: 2019-11-13

## 2019-11-13 RX ORDER — EPHEDRINE SULFATE 50 MG/ML
INJECTION, SOLUTION INTRAMUSCULAR; INTRAVENOUS; SUBCUTANEOUS PRN
Status: DISCONTINUED | OUTPATIENT
Start: 2019-11-13 | End: 2019-11-13

## 2019-11-13 RX ORDER — DIPHENHYDRAMINE HYDROCHLORIDE 50 MG/ML
25 INJECTION INTRAMUSCULAR; INTRAVENOUS EVERY 6 HOURS PRN
Status: DISCONTINUED | OUTPATIENT
Start: 2019-11-13 | End: 2019-11-15 | Stop reason: HOSPADM

## 2019-11-13 RX ORDER — DIPHENHYDRAMINE HCL 25 MG
25 CAPSULE ORAL EVERY 6 HOURS PRN
Status: DISCONTINUED | OUTPATIENT
Start: 2019-11-13 | End: 2019-11-15 | Stop reason: HOSPADM

## 2019-11-13 RX ORDER — HYDRALAZINE HYDROCHLORIDE 20 MG/ML
2.5-5 INJECTION INTRAMUSCULAR; INTRAVENOUS EVERY 10 MIN PRN
Status: DISCONTINUED | OUTPATIENT
Start: 2019-11-13 | End: 2019-11-13 | Stop reason: HOSPADM

## 2019-11-13 RX ORDER — PROPOFOL 10 MG/ML
INJECTION, EMULSION INTRAVENOUS PRN
Status: DISCONTINUED | OUTPATIENT
Start: 2019-11-13 | End: 2019-11-13

## 2019-11-13 RX ORDER — CEFAZOLIN SODIUM 1 G/3ML
1 INJECTION, POWDER, FOR SOLUTION INTRAMUSCULAR; INTRAVENOUS SEE ADMIN INSTRUCTIONS
Status: DISCONTINUED | OUTPATIENT
Start: 2019-11-13 | End: 2019-11-13 | Stop reason: HOSPADM

## 2019-11-13 RX ORDER — OXYBUTYNIN CHLORIDE 5 MG/1
10 TABLET ORAL DAILY
COMMUNITY

## 2019-11-13 RX ORDER — PHENYLEPHRINE HCL IN 0.9% NACL 1 MG/10 ML
SYRINGE (ML) INTRAVENOUS PRN
Status: DISCONTINUED | OUTPATIENT
Start: 2019-11-13 | End: 2019-11-13

## 2019-11-13 RX ORDER — LABETALOL 20 MG/4 ML (5 MG/ML) INTRAVENOUS SYRINGE
10
Status: COMPLETED | OUTPATIENT
Start: 2019-11-13 | End: 2019-11-13

## 2019-11-13 RX ORDER — AMOXICILLIN 250 MG
1 CAPSULE ORAL 2 TIMES DAILY PRN
Status: DISCONTINUED | OUTPATIENT
Start: 2019-11-13 | End: 2019-11-15 | Stop reason: HOSPADM

## 2019-11-13 RX ORDER — HYDRALAZINE HYDROCHLORIDE 20 MG/ML
INJECTION INTRAMUSCULAR; INTRAVENOUS
Status: COMPLETED
Start: 2019-11-13 | End: 2019-11-13

## 2019-11-13 RX ORDER — ONDANSETRON 4 MG/1
4 TABLET, ORALLY DISINTEGRATING ORAL EVERY 6 HOURS PRN
Status: DISCONTINUED | OUTPATIENT
Start: 2019-11-13 | End: 2019-11-15 | Stop reason: HOSPADM

## 2019-11-13 RX ORDER — PRAMIPEXOLE DIHYDROCHLORIDE 0.5 MG/1
0.5 TABLET ORAL AT BEDTIME
Status: DISCONTINUED | OUTPATIENT
Start: 2019-11-13 | End: 2019-11-15 | Stop reason: HOSPADM

## 2019-11-13 RX ORDER — ZOLPIDEM TARTRATE 5 MG/1
5-10 TABLET ORAL
Status: DISCONTINUED | OUTPATIENT
Start: 2019-11-13 | End: 2019-11-15 | Stop reason: HOSPADM

## 2019-11-13 RX ADMIN — KETAMINE HYDROCHLORIDE 30 MG: 10 INJECTION, SOLUTION INTRAMUSCULAR; INTRAVENOUS at 07:53

## 2019-11-13 RX ADMIN — OXYBUTYNIN CHLORIDE 5 MG: 5 TABLET ORAL at 14:59

## 2019-11-13 RX ADMIN — HYDRALAZINE HYDROCHLORIDE 5 MG: 20 INJECTION INTRAMUSCULAR; INTRAVENOUS at 12:25

## 2019-11-13 RX ADMIN — ROPIVACAINE HYDROCHLORIDE 25 ML: 5 INJECTION, SOLUTION EPIDURAL; INFILTRATION; PERINEURAL at 07:42

## 2019-11-13 RX ADMIN — Medication 10 MG: at 08:45

## 2019-11-13 RX ADMIN — LABETALOL 20 MG/4 ML (5 MG/ML) INTRAVENOUS SYRINGE 10 MG: at 11:20

## 2019-11-13 RX ADMIN — PRAMIPEXOLE DIHYDROCHLORIDE 0.5 MG: 0.5 TABLET ORAL at 20:53

## 2019-11-13 RX ADMIN — OXYCODONE HYDROCHLORIDE 5 MG: 5 TABLET ORAL at 20:53

## 2019-11-13 RX ADMIN — Medication 10 MG: at 08:14

## 2019-11-13 RX ADMIN — PROPOFOL 50 MG: 10 INJECTION, EMULSION INTRAVENOUS at 08:26

## 2019-11-13 RX ADMIN — SODIUM CHLORIDE: 9 INJECTION, SOLUTION INTRAVENOUS at 13:47

## 2019-11-13 RX ADMIN — CEFAZOLIN SODIUM 2 G: 2 INJECTION, SOLUTION INTRAVENOUS at 23:50

## 2019-11-13 RX ADMIN — LISINOPRIL 20 MG: 20 TABLET ORAL at 15:00

## 2019-11-13 RX ADMIN — CEFAZOLIN SODIUM 2 G: 2 INJECTION, SOLUTION INTRAVENOUS at 07:50

## 2019-11-13 RX ADMIN — CEFAZOLIN SODIUM 2 G: 2 INJECTION, SOLUTION INTRAVENOUS at 16:31

## 2019-11-13 RX ADMIN — SODIUM CHLORIDE, POTASSIUM CHLORIDE, SODIUM LACTATE AND CALCIUM CHLORIDE: 600; 310; 30; 20 INJECTION, SOLUTION INTRAVENOUS at 07:48

## 2019-11-13 RX ADMIN — DEXAMETHASONE SODIUM PHOSPHATE 10 MG: 10 INJECTION, SOLUTION INTRAMUSCULAR; INTRAVENOUS at 07:42

## 2019-11-13 RX ADMIN — PROPOFOL 200 MG: 10 INJECTION, EMULSION INTRAVENOUS at 07:53

## 2019-11-13 RX ADMIN — OXYCODONE HYDROCHLORIDE 5 MG: 5 TABLET ORAL at 16:30

## 2019-11-13 RX ADMIN — Medication 100 MCG: at 08:45

## 2019-11-13 ASSESSMENT — ACTIVITIES OF DAILY LIVING (ADL)
ADLS_ACUITY_SCORE: 13
ADLS_ACUITY_SCORE: 15

## 2019-11-13 ASSESSMENT — MIFFLIN-ST. JEOR: SCORE: 1903.49

## 2019-11-13 NOTE — PLAN OF CARE
[facility] med list reviewed and went over with patient.     Patient does not want methocarbamol on his file anymore and doesn't want it ordered. He said he hasn't gotten it for a year so I deleted it from his list.     Additions: medical marijuana, vit d, proair and iron.     See notes in med list.     [place] notified of reconciliation.     Racheal Chu on 11/13/2019 at 2:34 PM

## 2019-11-13 NOTE — OR NURSING
Pateint discharged to 3112.  Julio score 10. Pain level 0/10.  Discharged from unit via bed.  Hand off report given to Laureen and Lobo Zheng

## 2019-11-13 NOTE — ANESTHESIA POSTPROCEDURE EVALUATION
Patient: Ramy Guillermo    Procedure(s):  LEFT TOTAL SHOULDER ARTHROPLASTY    Diagnosis:Left shoulder pain [M25.512]  Diagnosis Additional Information: No value filed.    Anesthesia Type:  General, Peripheral Nerve Block, For Post-op pain in coordination with surgeon, LMA    Note:  Anesthesia Post Evaluation    Patient location during evaluation: PACU  Patient participation: Able to fully participate in evaluation  Level of consciousness: awake and alert  Pain management: adequate  Airway patency: patent  Cardiovascular status: acceptable  Respiratory status: room air  Hydration status: acceptable  PONV: none       Comments: Patient is awake and alert in PACU the entire stay. His block is working great and his pain is 0/10 and he hs no nausea. We have been treating his blood pressure, however. He did not take his bp meds today so we have had to treat with IV antihypertensives. He has had a longer PACU stay as a result.         Last vitals:  Vitals:    11/13/19 1205 11/13/19 1210 11/13/19 1215   BP: (!) 141/103 137/96 (!) 142/101   Pulse: 75 80 72   Resp: 16 19 15   Temp:      SpO2: 98% 97% 97%         Electronically Signed By: ABRAHAM Ordonez CRNA  November 13, 2019  12:25 PM

## 2019-11-13 NOTE — PLAN OF CARE
Reason for hospital stay:  Left shoulder Arthro    Most recent vitals: /88   Pulse 79   Temp 97.6  F (36.4  C) (Tympanic)   Resp 18   Ht 1.829 m (6')   Wt 107 kg (236 lb)   SpO2 97%   BMI 32.01 kg/m    Pain Management: No complaints of pain this shift.  Orientation:  A/O x3, mood and behavior appropriate  Cardiac:  Pt in A.fib with HR 60-70's  Respiratory:  LS clear  GI:  Bowel tones present x 4 quads  :  No issues voiding  Diet: Clear liquid  Skin Issues: Left shoulder Aquacel drsg, sling in place  IVF:  NS infusing at 75 mL/hr.  ABX:  None during this shift.  Mobility:  Not out of bed this shift. Indep repositions in bed.  Safety: Items placed on right side of bed on table, call light within reach    Face to face report given with opportunity to observe patient.    Report given to Naomi MANCILLA and Tricia MANCILLA.    Lobo Kaye RN   11/13/2019  4:04 PM

## 2019-11-13 NOTE — ANESTHESIA PROCEDURE NOTES
Peripheral nerve/Neuraxial procedure note : Interscalene  Pre-Procedure  Performed by  Brett Yuen APRN CRNA   Location: pre-op    Procedure Times:11/13/2019 7:35 AM and 11/13/2019 7:42 AM  Pre-Anesthestic Checklist: patient identified, IV checked, site marked, risks and benefits discussed, informed consent, monitors and equipment checked, pre-op evaluation, at physician/surgeon's request and post-op pain management    Timeout  Correct Patient: Yes   Correct Procedure: Yes   Correct Site: Yes   Correct Laterality: Yes   Correct Position: Yes   Site Marked: Yes   .   Procedure Documentation    .    Procedure: Interscalene, left.   Patient Position:sitting   Ultrasound used to identify targeted nerve, plexus, or vascular marker and placed a needle adjacent to it., Ultrasound was used to visualize the spread of the anesthetic in close proximity to the above stated nerve. A permanent image is entered into the patient's record.  Patient Prep/Sterile Barriers; chlorhexidine gluconate and isopropyl alcohol.  .  Needle:  Needle Gauge: 22.    Needle Length (Inches) 2   .        Assessment/Narrative  Paresthesias: No.  .  The placement was negative for: blood aspirated, painful injection and site bleeding.  Bolus given via..   Secured via.   Complications: none.     11/13/2019 7:42 AM

## 2019-11-13 NOTE — BRIEF OP NOTE
[facility]    Brief Operative Note    Pre-operative diagnosis: Left shoulder pain [M25.512]  Post-operative diagnosis Same as pre-operative diagnosis    Procedure: Procedure(s):  LEFT TOTAL SHOULDER ARTHROPLASTY  Surgeon: Surgeon(s) and Role:     * Alexandre Guillory MD - Primary  Anesthesia: General   Estimated blood loss: * No values recorded between 11/13/2019  8:24 AM and 11/13/2019 11:07 AM *  Drains: None  Specimens:   ID Type Source Tests Collected by Time Destination   A : left shoulder bone and tissue Tissue Shoulder SURGICAL PATHOLOGY EXAM Alexandre Guillory MD 11/13/2019 10:28 AM      Findings:   None.  Complications: None.  Implants:   Implant Name Type Inv. Item Serial No.  Lot No. LRB No. Used   Bone Cement-Simplex  Bone Cement-Simplex  FRANCISCA [id number] Left 1   corti;oc pegged glenoid   [id number] [employer]  Left 1   humeral head   [id number] [employer]  Left 1   humeral stem   [id number] [employer]  Left 1

## 2019-11-13 NOTE — ANESTHESIA CARE TRANSFER NOTE
Patient: Ramy Guillermo    Procedure(s):  LEFT TOTAL SHOULDER ARTHROPLASTY    Diagnosis: Left shoulder pain [M25.512]  Diagnosis Additional Information: No value filed.    Anesthesia Type:   General, Peripheral Nerve Block, For Post-op pain in coordination with surgeon, LMA     Note:  Anesthesia Care Transfer Notewriter    Vitals: (Last set prior to Anesthesia Care Transfer)    CRNA VITALS  11/13/2019 1029 - 11/13/2019 1118      11/13/2019             Pulse:  88    SpO2:  98 %    Resp Rate (set):  8                Electronically Signed By: ABRAHAM Ordonez CRNA  November 13, 2019  11:18 AM

## 2019-11-13 NOTE — OP NOTE
Procedure Date: 11/13/2019      PREOPERATIVE DIAGNOSIS:  Left shoulder osteoarthritis.      POSTOPERATIVE DIAGNOSIS:  Left shoulder osteoarthritis.      PROCEDURE PERFORMED:  Left total shoulder arthroplasty.      SURGEON:  Alexandre uGillory MD      ASSISTANT SURGEON:  None.      ANESTHESIA:  General.      IMPLANTS:  A Tornier Aequalis Perform CortiLoc pegged glenoid, medium.  The stem was Aequalis Ascend Flex standard humeral stem in the alpha neck cut.  The humeral head was a 51 x 20 high-offset head implanted between the 5 and 6 position.      INDICATIONS:  Ramy Guillermo is a 64-year-old gentleman with end-stage osteoarthritis of his left shoulder.  He can no longer tolerate his shoulder in this fashion.  All of the risks and benefits of surgical intervention were discussed with him.  He has exhausted all conservative measures for treatment.  He wished to proceed.      DESCRIPTION OF PROCEDURE:  The patient was taken to the operating room.  After adequate induction of anesthesia, was positioned, prepped and draped in the usual sterile fashion on the operative table.  A universal protocol timeout was initiated and once all in the room were in agreement, the incision was made overlying the deltopectoral interval.  This was made from the coracoid down to the midline of the humerus.  This was carried down through the subcutaneous tissues and down to the deltoid.  The deltoid was skirted medially.  The cephaliv vein was taken medially.  Crossing vessels were cauterized.  A Kolbel retractor was placed between the deltoid and the pectoralis.  The lateral border of the short head of the biceps was identified and the Kolbel retractor was placed in between the conjoined tendon and the deltoid.  Three crossing vessels were cauterized and the biceps was released off of the groove.  The biceps was noted to be tenodesed at the top of the groove.  The biceps was then released from the groove and the rotator interval was entered.  A  1-inch curved osteotome was then used to perform an osteotomy of the lesser tuberosity.  A complete capsulotomy was performed.  However, this was extremely difficult as he had extremely large osteophytes both anteriorly and inferiorly as well as posteriorly, so obtaining external rotation was extremely difficult with the shoulder.  Once we were able to unlock the humeral head from the posterior glenoid, we were able to osteotomize the humeral head.  The osteophytes were then cleared both inferiorly, anteriorly and posteriorly in their entirety.  He still had a very tight posterior capsule.  The humerus was then instrumented up to a size 4 alpha stem and the broach was left in place.  The cup protector was not applied as this was an extremely difficult shoulder to gain access to.  A dura retractor was placed posteriorly.  A Batman retractor was placed anteriorly.  There was a complete calcification of the anterior labrum and the anterior labrum was subsequently removed, staying on bone and peeling the capsule off of the anterior labrum.  This was carefully done using the Bovie cautery inferiorly.  At this point, we were able to julio cesar and drill, sizing the humerus to a size medium.  We marked and drilled the center hole and subsequently reamed to a size medium for a size medium glenoid.  Once this was accomplished, the hole was widened the peripheral pegs were drilled and cement was mixed on the back table.  The reamings from the glenoid were then packed into the flutes and the peripheral peg holes received cement.  The glenoid was then impacted into place and found to fit perfectly.  The humerus was then delivered back out of the wound, taking care not to bottle cap the glenoid component off the glenoid face.  The humeral heads were trialled at which point we came up with a 51 mm x 20 mm high-offset head, which fit the proximal humeral head.  Attention was then turned to the final construct.  He had excellent range of  motion with that humeral head in place.  The final construct was then removed from the proximal humerus and 2 mm drill holes were then used and FiberWire was placed for a lateral row and a medial row repair of the lesser tuberosity back to the anterior humerus.  The final implants were then impacted together on the back table and these were impacted into the humerus and found to fit nicely.  The humerus was then delivered back into the wound.  A Kolbel retractor was placed between the conjoined tendon and the deltoid.  The #2 FiberWire was then passed through the subscapularis tendon around the tuberosity.  The wound was copiously irrigated.  At this point, the lesser tuberosity was reduced back to its bony bed.  The lateral sutures were tied.  The medial row sutures were then tied.  The inferior most lateral row suture was used to tenodese the biceps and the superior most lateral row suture was used to run the rotator interval.  This concluded the total shoulder arthroplasty.  The anterior deltoid was noted to have been lacerated and so this was repaired with #1 Vicryl, #1 Vicryl was then used to reapproximate the deltopectoral interval and 2-0 Vicryl was used in subcutaneous.  Skin staples were used and then an Aquacel was applied to the anterior of the left shoulder.  The patient tolerated the procedure well.  Counts were correct at the end of procedure.  He was taken in stable condition to postanesthesia care unit.         GLORIA CANCHOLA MD             D: 2019   T: 2019   MT: SIMI      Name:     CHADWICK HERNANDEZ   MRN:      [id number]        Account:        [id number]   :      1955           Procedure Date: 2019      Document: [id number]

## 2019-11-13 NOTE — PROGRESS NOTES
[facility]    Hospitalist Progress Note    Date of Service (when I saw the patient): 11/13/2019    Assessment & Plan       S/P shoulder replacement, left: Please see procedure note. No immediate complications noted.       Essential hypertension, benign: Restart home dose lisinopril      GERD (gastroesophageal reflux disease): tolerated daily ASA well after TKA, will not start PPI unless he develops symptoms      Overactive bladder: Continue oxybutinin      DVT Prophylaxis: Defer to primary service-per ASA  Code Status: Full Code    Disposition: Expected discharge in 1-2 days once pain controlled eating/drinking well.    America Cruz, CNP    Subjective:  Awake,alert, no acute distress. He denies nausea, chest pain, shortness of breath. Minimal pain to shoulder at this time.     -Data reviewed today: I reviewed all new labs and imaging results over the last 24 hours.    Physical Exam   Temp: 97.6  F (36.4  C) Temp src: Tympanic BP: (!) 152/101 Pulse: 79 Heart Rate: 82 Resp: 18 SpO2: 98 % O2 Device: None (Room air)    Vitals:    11/13/19 0707   Weight: 107 kg (236 lb)     Vital Signs with Ranges  Temp:  [97.6  F (36.4  C)-98  F (36.7  C)] 97.6  F (36.4  C)  Pulse:  [62-89] 79  Heart Rate:  [67-85] 82  Resp:  [12-23] 18  BP: (130-178)/() 152/101  SpO2:  [81 %-98 %] 98 %  No intake/output data recorded.    Peripheral IV 11/13/19 Right;Dorsal Hand (Active)   Site Assessment WDL 11/13/2019 12:27 PM   Line Status Infusing 11/13/2019 12:27 PM   Phlebitis Scale 0-->no symptoms 11/13/2019 12:27 PM   Infiltration Scale 0 11/13/2019 11:10 AM   Infiltration Site Treatment Method  None 11/13/2019 12:27 PM   Number of days: 0       Incision/Surgical Site 04/11/19 Left Knee (Active)   Number of days: 216       Incision/Surgical Site 11/13/19 Left Shoulder (Active)   Incision Assessment UTV 11/13/2019 12:36 PM   Closure Staples 11/13/2019 11:13 AM   Dressing Intervention Clean, dry, intact 11/13/2019 12:36 PM    Number of days: 0     Line/device assessment(s) completed for medical necessity    Constitutional: Awake,alert, no acute distress  Respiratory: Clear bilaterally, no wheezes, crackles, rhonchi.  Cardiovascular: HRR, no murmurs, rubs,thrills  GI: Soft,nontender, bowel sounds positive  Skin/Integumen: No rashes, bruising or open areas.       Medications     sodium chloride 75 mL/hr at 11/13/19 1347       [START ON 11/14/2019] aspirin  325 mg Oral Daily     ceFAZolin  2 g Intravenous Q8H     lisinopril  20 mg Oral Daily     oxybutynin  5 mg Oral Daily     pramipexole  0.5 mg Oral At Bedtime     sodium chloride (PF)  3 mL Intracatheter Q8H       Data   No lab results found in last 7 days.    Recent Results (from the past 24 hour(s))   XR Shoulder Left Port G/E 2 Views    Narrative    XR SHOULDER LT PORT G/E 2 VW, 11/13/2019 12:07 PM    History: Male, age 64 years; S/P L TSA    Comparison: Left shoulder x-ray 7/26/2019    Technique: Two views were obtained.    FINDINGS: The metallic arthroplasty device is satisfactorily  positioned. Surrounding bone and overlying soft tissues appear grossly  intact.      Impression    IMPRESSION:   1.  Status post metallic arthroplasty. No acute complication.    PEÑA CARTER MD

## 2019-11-13 NOTE — PLAN OF CARE
[facility] Inpatient Admission Note:    Patient admitted to 3112/3112-1 at approximately 1250 via bed accompanied by nurse from surgery . Report received from [facility] in SBAR format at 1255 via face to face in room. Patient in bed via surgery. Patient is alert and oriented X 3, denies pain; rates at 0 on 0-10 scale.  Patient oriented to room, unit, hourly rounding, and plan of care. Explained admission packet and patient handbook with patient bill of rights brochure. Will continue to monitor and document as needed.     Inpatient Nursing criteria listed below was met:    Health care directives status obtained and documented: Yes    Care Everywhere authorization obtained No    MRSA swab completed for patient 65 years and older: N/A    Patient identifies a surrogate decision maker: Yes If yes, who:Haylie (Wife) Contact Information:See Facesheet     If initial lactic acid >2.0, repeat lactic acid drawn within one hour of arrival to unit: NA. If no, state reason: N/A    Vaccination assessment and education completed: Yes   Vaccinations received prior to admission: Pneumovax no  Influenza(seasonal)  YES   Vaccination(s) ordered: not given today because up to date.    Clergy visit ordered if patient requests: N/A    Skin issues/needs documented: Yes    Isolation Patient: no Education given, correct sign in place and documentation row added to PCS:  N/A    Fall Prevention Yes: Care plan updated, education given and documented, sticker and magnet in place: Yes    Care Plan initiated: Yes    Education Documented (including assessment): Yes    Patient has discharge needs : No If yes, please explain:N/A

## 2019-11-14 ENCOUNTER — ANESTHESIA (OUTPATIENT)
Dept: MEDSURG UNIT | Facility: HOSPITAL | Age: 64
DRG: 483 | End: 2019-11-14
Payer: COMMERCIAL

## 2019-11-14 ENCOUNTER — ANESTHESIA EVENT (OUTPATIENT)
Dept: MEDSURG UNIT | Facility: HOSPITAL | Age: 64
DRG: 483 | End: 2019-11-14
Payer: COMMERCIAL

## 2019-11-14 ENCOUNTER — APPOINTMENT (OUTPATIENT)
Dept: OCCUPATIONAL THERAPY | Facility: HOSPITAL | Age: 64
DRG: 483 | End: 2019-11-14
Attending: ORTHOPAEDIC SURGERY
Payer: COMMERCIAL

## 2019-11-14 LAB
COPATH REPORT: NORMAL
GLUCOSE SERPL-MCNC: 132 MG/DL (ref 70–99)
HGB BLD-MCNC: 12.4 G/DL (ref 13.3–17.7)

## 2019-11-14 PROCEDURE — 82947 ASSAY GLUCOSE BLOOD QUANT: CPT | Performed by: ORTHOPAEDIC SURGERY

## 2019-11-14 PROCEDURE — 93010 ELECTROCARDIOGRAM REPORT: CPT | Performed by: INTERNAL MEDICINE

## 2019-11-14 PROCEDURE — 85018 HEMOGLOBIN: CPT | Performed by: ORTHOPAEDIC SURGERY

## 2019-11-14 PROCEDURE — 3E0T3BZ INTRODUCTION OF ANESTHETIC AGENT INTO PERIPHERAL NERVES AND PLEXI, PERCUTANEOUS APPROACH: ICD-10-PCS | Performed by: NURSE ANESTHETIST, CERTIFIED REGISTERED

## 2019-11-14 PROCEDURE — 99232 SBSQ HOSP IP/OBS MODERATE 35: CPT | Mod: 24 | Performed by: NURSE PRACTITIONER

## 2019-11-14 PROCEDURE — 97165 OT EVAL LOW COMPLEX 30 MIN: CPT | Mod: GO

## 2019-11-14 PROCEDURE — 64415 NJX AA&/STRD BRCH PLXS IMG: CPT | Performed by: NURSE ANESTHETIST, CERTIFIED REGISTERED

## 2019-11-14 PROCEDURE — 97530 THERAPEUTIC ACTIVITIES: CPT | Mod: GO

## 2019-11-14 PROCEDURE — 12000000 ZZH R&B MED SURG/OB

## 2019-11-14 PROCEDURE — 36415 COLL VENOUS BLD VENIPUNCTURE: CPT | Performed by: ORTHOPAEDIC SURGERY

## 2019-11-14 PROCEDURE — 25000132 ZZH RX MED GY IP 250 OP 250 PS 637: Performed by: NURSE PRACTITIONER

## 2019-11-14 PROCEDURE — 93005 ELECTROCARDIOGRAM TRACING: CPT

## 2019-11-14 PROCEDURE — 76942 ECHO GUIDE FOR BIOPSY: CPT | Mod: 26 | Performed by: NURSE ANESTHETIST, CERTIFIED REGISTERED

## 2019-11-14 PROCEDURE — 25000128 H RX IP 250 OP 636: Performed by: NURSE ANESTHETIST, CERTIFIED REGISTERED

## 2019-11-14 PROCEDURE — 25000132 ZZH RX MED GY IP 250 OP 250 PS 637: Performed by: ORTHOPAEDIC SURGERY

## 2019-11-14 PROCEDURE — 40000275 ZZH STATISTIC RCP TIME EA 10 MIN

## 2019-11-14 PROCEDURE — 25000128 H RX IP 250 OP 636: Performed by: ORTHOPAEDIC SURGERY

## 2019-11-14 PROCEDURE — 25000128 H RX IP 250 OP 636: Performed by: NURSE PRACTITIONER

## 2019-11-14 RX ORDER — BUPIVACAINE HYDROCHLORIDE 2.5 MG/ML
INJECTION, SOLUTION EPIDURAL; INFILTRATION; INTRACAUDAL PRN
Status: DISCONTINUED | OUTPATIENT
Start: 2019-11-14 | End: 2019-11-14

## 2019-11-14 RX ORDER — HYDRALAZINE HYDROCHLORIDE 20 MG/ML
10 INJECTION INTRAMUSCULAR; INTRAVENOUS EVERY 6 HOURS PRN
Status: DISCONTINUED | OUTPATIENT
Start: 2019-11-14 | End: 2019-11-15 | Stop reason: HOSPADM

## 2019-11-14 RX ORDER — HYDROMORPHONE HYDROCHLORIDE 1 MG/ML
0.5 INJECTION, SOLUTION INTRAMUSCULAR; INTRAVENOUS; SUBCUTANEOUS EVERY 4 HOURS PRN
Status: DISCONTINUED | OUTPATIENT
Start: 2019-11-14 | End: 2019-11-15 | Stop reason: HOSPADM

## 2019-11-14 RX ORDER — KETOROLAC TROMETHAMINE 30 MG/ML
30 INJECTION, SOLUTION INTRAMUSCULAR; INTRAVENOUS EVERY 6 HOURS PRN
Status: DISCONTINUED | OUTPATIENT
Start: 2019-11-14 | End: 2019-11-15 | Stop reason: HOSPADM

## 2019-11-14 RX ADMIN — ACETAMINOPHEN 650 MG: 325 TABLET, FILM COATED ORAL at 10:51

## 2019-11-14 RX ADMIN — OXYCODONE HYDROCHLORIDE 10 MG: 5 TABLET ORAL at 08:02

## 2019-11-14 RX ADMIN — ACETAMINOPHEN 650 MG: 325 TABLET, FILM COATED ORAL at 06:45

## 2019-11-14 RX ADMIN — LISINOPRIL 20 MG: 20 TABLET ORAL at 08:03

## 2019-11-14 RX ADMIN — OXYCODONE HYDROCHLORIDE 5 MG: 5 TABLET ORAL at 23:53

## 2019-11-14 RX ADMIN — OXYCODONE HYDROCHLORIDE 10 MG: 5 TABLET ORAL at 00:28

## 2019-11-14 RX ADMIN — ASPIRIN 325 MG: 325 TABLET, DELAYED RELEASE ORAL at 08:03

## 2019-11-14 RX ADMIN — OXYBUTYNIN CHLORIDE 5 MG: 5 TABLET ORAL at 08:02

## 2019-11-14 RX ADMIN — ACETAMINOPHEN 650 MG: 325 TABLET, FILM COATED ORAL at 20:16

## 2019-11-14 RX ADMIN — OMEPRAZOLE 40 MG: 20 CAPSULE, DELAYED RELEASE ORAL at 08:03

## 2019-11-14 RX ADMIN — ONDANSETRON 4 MG: 4 TABLET, ORALLY DISINTEGRATING ORAL at 11:04

## 2019-11-14 RX ADMIN — OXYCODONE HYDROCHLORIDE 10 MG: 5 TABLET ORAL at 04:47

## 2019-11-14 RX ADMIN — PRAMIPEXOLE DIHYDROCHLORIDE 0.5 MG: 0.5 TABLET ORAL at 21:19

## 2019-11-14 RX ADMIN — OXYCODONE HYDROCHLORIDE 10 MG: 5 TABLET ORAL at 10:51

## 2019-11-14 RX ADMIN — KETOROLAC TROMETHAMINE 30 MG: 30 INJECTION, SOLUTION INTRAMUSCULAR; INTRAVENOUS at 13:20

## 2019-11-14 RX ADMIN — BUPIVACAINE HYDROCHLORIDE 10 ML: 2.5 INJECTION, SOLUTION EPIDURAL; INFILTRATION; INTRACAUDAL at 14:16

## 2019-11-14 ASSESSMENT — ACTIVITIES OF DAILY LIVING (ADL)
ADLS_ACUITY_SCORE: 13

## 2019-11-14 ASSESSMENT — LIFESTYLE VARIABLES: TOBACCO_USE: 1

## 2019-11-14 ASSESSMENT — ENCOUNTER SYMPTOMS: DYSRHYTHMIAS: 1

## 2019-11-14 NOTE — PROVIDER NOTIFICATION
America Cruz updated on Pt's BP elevated again 176/107 this am with lisinopril as scheduled and now 170/100. Pain is 5/10 with Gita 10 mg given q3h and Tylenol. New orders received.

## 2019-11-14 NOTE — PROGRESS NOTES
[facility]    Hospitalist Progress Note    Date of Service (when I saw the patient): 11/14/2019    Assessment & Plan       S/P shoulder replacement, left: Please see procedure note. No immediate complications noted.       Essential hypertension, benign: Running higher during the night due to uncontrolled pain. Will increase pain medication, add toradol, hydralazine PRN      GERD (gastroesophageal reflux disease): tolerated daily ASA well after TKA, will not start PPI unless he develops symptoms      Overactive bladder: Continue oxybutinin      DVT Prophylaxis: Defer to primary service-per ASA  Code Status: Full Code    Disposition: Expected discharge in 1-2 days once pain controlled eating/drinking well.    America Cruz, CNP    Subjective:  Awake,alert, no acute distress. He denies nausea, chest pain, shortness of breath. Pain to shoulder severe over night, a bit better this morning.     -Data reviewed today: I reviewed all new labs and imaging results over the last 24 hours.    Physical Exam   Temp: 97.4  F (36.3  C) Temp src: Tympanic BP: 170/100 Pulse: 69 Heart Rate: 77 Resp: 16 SpO2: 99 % O2 Device: None (Room air)    Vitals:    11/13/19 0707   Weight: 107 kg (236 lb)     Vital Signs with Ranges  Temp:  [96  F (35.6  C)-98.6  F (37  C)] 97.4  F (36.3  C)  Pulse:  [69-96] 69  Heart Rate:  [67-91] 77  Resp:  [12-23] 16  BP: (120-176)/() 170/100  SpO2:  [81 %-99 %] 99 %  I/O last 3 completed shifts:  In: 2387 [P.O.:1470; I.V.:917]  Out: 2575 [Urine:2575]    Peripheral IV 11/13/19 Right;Dorsal Hand (Active)   Site Assessment WDL 11/14/2019  7:54 AM   Line Status Saline locked;Checked every 1-2 hour 11/14/2019  7:54 AM   Phlebitis Scale 0-->no symptoms 11/14/2019  7:54 AM   Infiltration Scale 0 11/14/2019  7:54 AM   Infiltration Site Treatment Method  None 11/13/2019 12:27 PM   Extravasation? No 11/14/2019 12:43 AM   Number of days: 1       Incision/Surgical Site 04/11/19 Left Knee (Active)    Number of days: 217       Incision/Surgical Site 11/13/19 Left Shoulder (Active)   Incision Assessment RUST 11/14/2019  7:55 AM   Ivon-Incision Assessment RUST 11/14/2019  7:55 AM   Closure IVETT 11/13/2019 11:40 PM   Incision Drainage Amount Scant 11/14/2019  7:55 AM   Drainage Description RUST 11/14/2019  7:55 AM   Dressing Intervention Dressing marked - No change 11/14/2019  7:55 AM   Number of days: 1     Line/device assessment(s) completed for medical necessity    Constitutional: Awake,alert, no acute distress  Respiratory: Clear bilaterally, no wheezes, crackles, rhonchi.  Cardiovascular: HRR, no murmurs, rubs,thrills  GI: Soft,nontender, bowel sounds positive  Skin/Integumen: No rashes, bruising or open areas.       Medications       aspirin  325 mg Oral Daily     lisinopril  20 mg Oral Daily     omeprazole  40 mg Oral QAM     oxybutynin  5 mg Oral Daily     pramipexole  0.5 mg Oral At Bedtime     sodium chloride (PF)  3 mL Intracatheter Q8H       Data   Recent Labs   Lab 11/14/19  0517   HGB 12.4*   *       Recent Results (from the past 24 hour(s))   XR Shoulder Left Port G/E 2 Views    Narrative    XR SHOULDER LT PORT G/E 2 VW, 11/13/2019 12:07 PM    History: Male, age 64 years; S/P L TSA    Comparison: Left shoulder x-ray 7/26/2019    Technique: Two views were obtained.    FINDINGS: The metallic arthroplasty device is satisfactorily  positioned. Surrounding bone and overlying soft tissues appear grossly  intact.      Impression    IMPRESSION:   1.  Status post metallic arthroplasty. No acute complication.    PEÑA CARTER MD

## 2019-11-14 NOTE — ANESTHESIA PROCEDURE NOTES
Peripheral nerve/Neuraxial procedure note : Interscalene  Pre-Procedure    Location: floor    Procedure Times:11/14/2019 2:10 PM and 11/14/2019 2:16 PM  Pre-Anesthestic Checklist: patient identified, IV checked, site marked, risks and benefits discussed, informed consent, monitors and equipment checked, pre-op evaluation, at physician/surgeon's request and post-op pain management    Timeout  Correct Patient: Yes   Correct Procedure: Yes   Correct Site: Yes   Correct Laterality: Yes   Correct Position: Yes   Site Marked: Yes   .   Procedure Documentation    .    Procedure: Interscalene, left.   Patient Position:supine   Ultrasound used to identify targeted nerve, plexus, or vascular marker and placed a needle adjacent to it., Ultrasound was used to visualize the spread of the anesthetic in close proximity to the above stated nerve. A permanent image is entered into the patient's record.  Patient Prep/Sterile Barriers; chlorhexidine gluconate and isopropyl alcohol.  .  Needle: insulated   Needle Gauge: 22.    Needle Length (Inches) 2   Insertion Method: Single Shot.        Assessment/Narrative  Paresthesias: No.  Injection made incrementally with aspirations every 5 mL..  The placement was negative for: blood aspirated, painful injection and site bleeding.  Bolus given via needle..   Secured via.   Complications: none. Comments:  Assisted by Brett VALDEZ

## 2019-11-14 NOTE — PROGRESS NOTES
Medical record and Parminder Score reviewed. Participated in interdisciplinary rounds.  No apparent needs noted at this time. Care Transitions will remain available if needs arise.

## 2019-11-14 NOTE — PROGRESS NOTES
Inpatient Occupational Therapy Evaluation    Name: Ramy Guillermo MRN# [id number]   Age: 64 year old YOB: 1955     Date of Consultation: 2019  Consultation is requested by: Dr. Guillory  Specific Consultation Request: Total Shoulder Post-op  Primary care provider: Mray Wooten    General Information:   Onset Date: 19 (DOS)    History of Current Problem/Admission: Left shoulder pain [M25.512]    Prior Level of Function: Pt previously independent in ADLs and did not use an assistive device for functional mobility. Pt has had both hips and knees replaced, as well as his R shoulder, therefore, is not new to this.   Ambulation: 0 - Independent   Transferrin - Independent   Toiletin - Assistive Equipment    Bathin - Independent   Dressin - Independent   Eatin - Independent   Communication: 0 - Understands/communicates without difficulty  Swallowin - swallows foods/liquids without difficulty  Cognition: 0 - no cognitive issues reported    Fall history within the last 6 months: No    Current Living Situation: Pt lives with his wife in a house with 2 steps to enter. Bedroom and office are upstairs, but there is a bedroom on the main floor and pt can bring his laptop down to the main floor. Bathroom on the main floor is attached to the bedroom and has a high rise toilet with grab bars and a ;arge walk in shower with a small lip (no grab bars in the shower). Pt's wife is an RN here at [employer].     Current Equipment Used at Home: None - has reacher and sock aid    Patient & Family Goals: Return home with wife     Past Medical History:   Past Medical History:   Diagnosis Date     Allergic rhinitis      Certain adverse effects, not elsewhere classified, other     required more sedation preop R vein ablation     Colon adenoma 8/3/2012    colonoscopy due 2017      DJD (degenerative joint disease) of lumbar spine     L4-L5 & L5-S1     Duodenal ulcer, unspecified as  acute or chronic, without hemorrhage, perforation, or obstruction      Essential hypertension, benign 2006     GERD (gastroesophageal reflux disease)      PONV (postoperative nausea and vomiting)        Past Surgical History:  Past Surgical History:   Procedure Laterality Date     ABLATE VEIN VARICOSE RADIO FREQUENCY WITH PHLEBECTOMY MULTIPLE STAB  10/7/11, 11/4/11    RT, LT Dr. Cruz Robert     ABLATE VEIN VARICOSE RADIO FREQUENCY WITH PHLEBECTOMY MULTIPLE STAB  10/7/2011    Procedure:ABLATE VEIN VARICOSE RADIO FREQUENCY WITH PHLEBECTOMY MULTIPLE STAB; Right Radio Frenquency with Greater Saphenous with  Phlebectomy Multiple Stab; Surgeon:CRUZ ROBERT; [facility]     ABLATE VEIN VARICOSE RADIO FREQUENCY WITH PHLEBECTOMY MULTIPLE STAB  11/4/2011    Procedure:ABLATE VEIN VARICOSE RADIO FREQUENCY WITH PHLEBECTOMY MULTIPLE STAB; Left Radio Frenquency with Greater Saphenous Vein with  Bilateral Phlebectomy Multiple Stab Varicose Veins.; Surgeon:CRUZ ROBERT; [facility]     ABLATE VEIN VARICOSE RADIO FREQUENCY WITH PHLEBECTOMY MULTIPLE STAB  11/28/2012    Procedure: ABLATE VEIN VARICOSE RADIO FREQUENCY WITH PHLEBECTOMY MULTIPLE STAB;   Bilateral stab Micro- phlebectomies;  Surgeon: Dany Mascorro MD;  Location: [facility]     ARTHROPLASTY KNEE Left 4/11/2019    Procedure: LEFT TOTAL KNEE ARTHROPLASTY/PERSONA;  Surgeon: Lamonte Cunha MD;  Location: [facility]     ARTHROSCOPY KNEE  3/1/2013    Procedure: ARTHROSCOPY KNEE;  Right knee arthoscopy w/ medial menisectomy and chondroplasty;  Surgeon: Abiodun Jacobson MD;  Location: [facility]     C EXCIS BOWEL LESION(S),SINGLE ENTEROTOMY  1968    small intestine     C RECONSTR TOTAL SHOULDER IMPLANT Right 10/24/2017     COLONOSCOPY N/A 8/30/2016    Procedure: COLONOSCOPY;  Surgeon: Calos Post MD;  Location: [facility]     COLONOSCOPY N/A 12/8/2017    Procedure: COLONOSCOPY;  COLONOSCOPY;  Surgeon: Calos Post MD;  Location: [facility]     FLEXIBLE SIGMOIDOSCOPY  1/9/07      HC COLONOSCOPY W BIOPSY  8/3/12    proximal ascending, tubular adenoma     HC EXCISION OF UVULA      for snoring     HC KNEE SCOPE,MED/LAT MENISECTOMY  05    RT + chondroplasty of medial femoral condyl & patellofemoral joint, Dr. Verma     HC PARTIAL REMOVAL/REPAIR,ACROMION  13    Left, Sachin     HC REPAIR BICEPS LONG TENDON  13    Left     HERNIA REPAIR, INGUINAL RT/LT  01/10/08    LT w/onlay mesh patch, Dr. Mchugh     JOINT REPLACEMENT, HIP RT/LT      RT hip RESURFACING arthroplasty, Dr. Ra Valdivia @[facility]       Medications:   Current Facility-Administered Medications   Medication     acetaminophen (TYLENOL) tablet 650 mg     aspirin (ASA) EC tablet 325 mg     diphenhydrAMINE (BENADRYL) capsule 25 mg    Or     diphenhydrAMINE (BENADRYL) injection 25 mg     hydrALAZINE (APRESOLINE) injection 10 mg     HYDROmorphone (PF) (DILAUDID) injection 0.5 mg     ketorolac (TORADOL) injection 30 mg     lidocaine (LMX4) kit     lidocaine 1 % 0.1-1 mL     lisinopril (PRINIVIL/ZESTRIL) tablet 20 mg     naloxone (NARCAN) injection 0.1-0.4 mg     omeprazole (priLOSEC) CR capsule 40 mg     ondansetron (ZOFRAN-ODT) ODT tab 4 mg    Or     ondansetron (ZOFRAN) injection 4 mg     oxybutynin (DITROPAN) tablet 5 mg     oxyCODONE (ROXICODONE) tablet 5-10 mg     pramipexole (MIRAPEX) tablet 0.5 mg     senna-docusate (SENOKOT-S/PERICOLACE) 8.6-50 MG per tablet 1 tablet     sodium chloride (PF) 0.9% PF flush 3 mL     sodium chloride (PF) 0.9% PF flush 3 mL     zolpidem (AMBIEN) tablet 5-10 mg       Weight Bearing Status: NWB LUE (no use of L shoulder)      Cognitive Status Examination:  Orientation: awake and alert  Level of Consciousness: alert  Follows Commands and Answers Questions: 100% of the time  Personal Safety and Judgement: Intact  Memory: Immediate recall intact and Long-term memory intact    Pain:   Currently in pain? Yes  Pain Location? left shoulder  Pain Ratin    Occupational Therapy Evaluation:    Integumentary/Edema: Incision covered L shoulder. No other issues observed   Hand Dominance: Right  Range of Motion: L fingers WFL, LUE sling in place. RUE WFL   Strength: LUE deferred due to recent surgery. RUE grossly 4/5  Muscle Tone Assessment: No issues observed  Coordination: Normal    Mobility:   Bed Mobility: Supine to sit EOB IND  Transfer Skills: SBA sit to stand from EOB and stand to sit in chair  Toilet Transfer: SBA sit<>stand from toilet using grab bars with RUE   Balance: Normal    Pt ambulated to/from the BR without assistive device with SBA.    ADLs:  Lower Body Dressing: IND donning lounger/pajama pants  Toileting: IND including clothing management after urinating   Grooming: IND standing at the sink to wash his hands and brush his teeth    IADLs:   Previous Responsibilities of the Patient: Meal Prep, Housekeeping, Laundry, Yard Work, Medication Management, Finances, and Driving   Comments: Pt is retired. His wife completes some meal prep, housekeeping, her own laundry, and drives. She will assist with yard work.     Activities of Daily Living Analysis:   Impairments Contributing to Impaired Activities of Daily Living: pain, post surgical precautions, and ROM decreased    Occupational Therapy Interventions: Evaluation + 1 treatment session only    Clinical Impressions:  Criteria for Skilled Therapeutic Intervention Met: Evaluation Only and No problems identified which require skilled intervention  OT Diagnosis: s/p L TSA  Influenced by the following impairments: Pain, post surgical precautions, and LUE ROM decreased  Functional limitations due to impairment: None today. Anticipate UB dressing and bathing might be challenging for pt  Clinical presentation: Stable/Uncomplicated  Clinical presentation rationale: Clinical judgement  Clinical Decision making (complexity): Low Complexity  Frequency: 1x  Predicted Duration of Therapy Intervention (days/wks): Today    Anticipated Discharge Disposition:  Home with Assist  Anticipated Equipment Needs at Discharge: None  Risks and Benefits of therapy have been explained: Yes  Patient, Family & other staff in agreement with plan of care: Yes  Clinical Impression Comments: Pt is 1 day s/p L TSA. He performed ADLs today with SBA to independently without issues. Pt's wife can assist with activities pt may have difficulties with at time of discharge. No concerns with home accessibility. Pt is not new to joint replacement, as this is his 6th one. No further skilled OT needs identified at this time. Will complete OT order.     Total Eval Time: 15

## 2019-11-14 NOTE — PLAN OF CARE
Updated the Pt on the policy for use of mariajuana in the hospital if he wanted to bring in and use his Rx with the Pt reporting that he felt he'd be fine without it till he was discharged home.

## 2019-11-14 NOTE — PLAN OF CARE
Reason for hospital stay: Left shoulder replacement    Most recent vitals: /98   Pulse 82   Temp 96  F (35.6  C) (Temporal)   Resp 18   Ht 1.829 m (6')   Wt 107 kg (236 lb)   SpO2 96%   BMI 32.01 kg/m      Pain Management:  Pain managed well with PRN Oxycodone 5 mg-- given 2x at 1630, 2053 with adequate results. Numbness to L fingers subsided. Helped pt reposition pillows     Orientation:  A&O x4    Cardiac:  Apical pulse regular-- no edema noted    Respiratory:  Lungs clear bilaterally, no cough     GI:  Bowel sounds hypoactive in all four quadrants    :  Reports no issues voiding    Nutrition:  Advanced to full diet tolerated well no N/V. Advanced to regular diet and tolerated that well- No N/V    Skin Issues: Aquacell to L shoulder--UTV. No open areas or bruising noted     IVF:  NS 75 ml/hr    ABX: Ancef    Mobility:  Stand by assist    Safety:  Call light within reach    Comments:    Face to face report given with opportunity to observe patient.    Report given to Belen PELAEZ RN    11/13/2019  6:22 PM  Tricia Morse

## 2019-11-14 NOTE — PLAN OF CARE
Pt is A&O, independent, regular diet.  VS /75 HR 96 RR 16 T 98.1 O2 96% on RA, c/o left shoulder pain 4/10.  Pt medicated w/ PRN Oxy x2 this shift w/ relief noted, ice packs used for comfort as well.  Lungs are CTA, bowels active x4, passing flatus.  Pt voiding adequately in urinal.  Dressing to left should is CDI w/ scant shadowing noted, outlined shadowing.  Pt c/o some tingling in a couple digits on left hand but states it has gotten better, otherwise CMS intact.  Denies N/V, had boxed lunch and tolerated well.  IV is SL, tolerating oral intake.  Brakes locked, call light within reach, makes needs known.  Frequent rounding done, free from falls.    Face to face report given with opportunity to observe patient.    Report given to LAURENT Garduno RN   11/14/2019  5:13 AM

## 2019-11-14 NOTE — ANESTHESIA POSTPROCEDURE EVALUATION
Patient: Ramy Guillermo    * No procedures listed *    Diagnosis:* No pre-op diagnosis entered *  Diagnosis Additional Information: No value filed.    Anesthesia Type:  Peripheral Nerve Block    Note:  Anesthesia Post Evaluation    Patient location during evaluation: Bedside  Patient participation: Able to participate in evaluation but full recovery from regional anesthesia has not yet ocurrred but is anticipated to occur within 48 hours  Level of consciousness: awake  Pain management: adequate  Airway patency: patent  Cardiovascular status: acceptable  Respiratory status: acceptable  Hydration status: acceptable  PONV: none     Anesthetic complications: None          Last vitals:  Vitals:    11/14/19 1049 11/14/19 1100 11/14/19 1158   BP: 170/100  168/94   Pulse:      Resp:  18 18   Temp:   98.3  F (36.8  C)   SpO2:            Electronically Signed By: ABRAHAM Diaz CRNA  November 14, 2019  2:30 PM

## 2019-11-14 NOTE — PLAN OF CARE
Reason for hospital stay:  Left shoulder replacement    Most recent vitals: /94   Pulse 69   Temp 98.3  F (36.8  C) (Tympanic)   Resp 18   Ht 1.829 m (6')   Wt 107 kg (236 lb)   SpO2 99%   BMI 32.01 kg/m    Pain Management:  C/o 6-3/10 shoulder pain, administered Gita 10 mg x2, tylenol, IV Toradol, and ice pack to shoulder with decrease. Anesthesia preformed a numbing block to the left shoulder to assist with pain control  Orientation:  A&O  Cardiac:  HR irr. EKG preformed this am. BP's elevated this shift, provider aware with prn Apresoline available, none given this shift  Respiratory:  LS clear. Denies sob  GI:  BS active. Slight nausea sensation, administered Zofran OTD with relief.  :  Voids without difficulty into the urinal  Diet: Reg. Well tolerated  Skin Issues:  Incision to left shoulder. Aqua Cell in place small marked shadowing noted without change. CMS intact, palpable pulse.  IVF:  IV SL  ABX:  Completed  Mobility:  Ind in room to transfer, steady on his feet  Safety:   Call light within reach.    Comments:    11/14/2019  2:51 PM  Laureen Jain RN      Face to face report given with opportunity to observe patient.    Report given to LAURENT Villalpando RN   11/14/2019  3:40 PM

## 2019-11-14 NOTE — PLAN OF CARE
Orders for PT evaluation. Pt moving very well according to OT with no mobility concerns. Attempted to see pt this afternoon however he was with anesthesia. Will attempt again when able.

## 2019-11-14 NOTE — PLAN OF CARE
Discharge Planner OT   Patient plan for discharge: Home with wife  Current status: Bed Mobility: Supine to sit EOB IND  Transfer Skills: SBA sit to stand from EOB and stand to sit in chair  Toilet Transfer: SBA sit<>stand from toilet using grab bars with RUE  Pt ambulated to/from the BR without assistive device with SBA  Lower Body Dressing: IND donning lounger/pajama pants  Toileting: IND including clothing management after urinating   Grooming: IND standing at the sink to wash his hands and brush his teeth  Barriers to return to prior living situation: None from an OT perspective  Recommendations for discharge: Home with Assist (wife)  Rationale for recommendations: Pt is 1 day s/p L TSA. He performed ADLs today with SBA to independently without issues. Pt's wife can assist with activities pt may have difficulties with at time of discharge. No concerns with home accessibility. Pt is not new to joint replacement, as this is his 6th one. No further skilled OT needs identified at this time. Will complete OT order.        Entered by: Jackie Harris 11/14/2019 1:28 PM

## 2019-11-14 NOTE — ANESTHESIA PREPROCEDURE EVALUATION
Anesthesia Pre-Procedure Evaluation    Patient: Ramy Guillermo   MRN: [id number] : 1955          Preoperative Diagnosis: * No pre-op diagnosis entered *    * No procedures listed *    Past Medical History:   Diagnosis Date     Allergic rhinitis      Certain adverse effects, not elsewhere classified, other     required more sedation preop R vein ablation     Colon adenoma 8/3/2012    colonoscopy due 2017      DJD (degenerative joint disease) of lumbar spine     L4-L5 & L5-S1     Duodenal ulcer, unspecified as acute or chronic, without hemorrhage, perforation, or obstruction      Essential hypertension, benign      GERD (gastroesophageal reflux disease)      PONV (postoperative nausea and vomiting)      Past Surgical History:   Procedure Laterality Date     ABLATE VEIN VARICOSE RADIO FREQUENCY WITH PHLEBECTOMY MULTIPLE STAB  10/7/11, 11    RT, LT Dr. Cruz Robert     ABLATE VEIN VARICOSE RADIO FREQUENCY WITH PHLEBECTOMY MULTIPLE STAB  10/7/2011    Procedure:ABLATE VEIN VARICOSE RADIO FREQUENCY WITH PHLEBECTOMY MULTIPLE STAB; Right Radio Frenquency with Greater Saphenous with  Phlebectomy Multiple Stab; Surgeon:CRUZ ROBERT; [facility]     ABLATE VEIN VARICOSE RADIO FREQUENCY WITH PHLEBECTOMY MULTIPLE STAB  2011    Procedure:ABLATE VEIN VARICOSE RADIO FREQUENCY WITH PHLEBECTOMY MULTIPLE STAB; Left Radio Frenquency with Greater Saphenous Vein with  Bilateral Phlebectomy Multiple Stab Varicose Veins.; Surgeon:CRUZ ROBERT; Location[facility]     ABLATE VEIN VARICOSE RADIO FREQUENCY WITH PHLEBECTOMY MULTIPLE STAB  2012    Procedure: ABLATE VEIN VARICOSE RADIO FREQUENCY WITH PHLEBECTOMY MULTIPLE STAB;   Bilateral stab Micro- phlebectomies;  Surgeon: Dany Mascorro MD;  Location: [facility]     ARTHROPLASTY KNEE Left 2019    Procedure: LEFT TOTAL KNEE ARTHROPLASTY/PERSONA;  Surgeon: Lamonte Cunha MD;  Location: [facility]     ARTHROSCOPY KNEE  3/1/2013    Procedure: ARTHROSCOPY  KNEE;  Right knee arthoscopy w/ medial menisectomy and chondroplasty;  Surgeon: Abiodun Jacobson MD;  Location: [facility]     C EXCIS BOWEL LESION(S),SINGLE ENTEROTOMY  1968    small intestine     C RECONSTR TOTAL SHOULDER IMPLANT Right 10/24/2017     COLONOSCOPY N/A 8/30/2016    Procedure: COLONOSCOPY;  Surgeon: Calos Post MD;  Location: [facility]     COLONOSCOPY N/A 12/8/2017    Procedure: COLONOSCOPY;  COLONOSCOPY;  Surgeon: Calos Post MD;  Location: [facility]     FLEXIBLE SIGMOIDOSCOPY  1/9/07     HC COLONOSCOPY W BIOPSY  8/3/12    proximal ascending, tubular adenoma     HC EXCISION OF UVULA  2001    for snoring     HC KNEE SCOPE,MED/LAT MENISECTOMY  7/11/05    RT + chondroplasty of medial femoral condyl & patellofemoral joint, Dr. Verma     HC PARTIAL REMOVAL/REPAIR,ACROMION  12/20/13    Left, Sachin     HC REPAIR BICEPS LONG TENDON  12/20/13    Left     HERNIA REPAIR, INGUINAL RT/LT  01/10/08    LT w/onlay mesh patch, Dr. Mchugh     JOINT REPLACEMENT, HIP RT/LT  9/06    RT hip RESURFACING arthroplasty, Dr. Ra Valdivia @[facility]       Anesthesia Evaluation     . Pt has had prior anesthetic. Type: General    History of anesthetic complications   - PONV        ROS/MED HX    ENT/Pulmonary:     (+)allergic rhinitis, tobacco use, Past use , . .    Neurologic:       Cardiovascular: Comment: Patient previously cancelled for procedure due to recent new onset persistent atrial fibrillation. Per recent cardiology note (10/17/2019), patient is in persistent atrial fibrillation with controlled ventricular rate without AV blocking agents. Recent echo showed normal LV function. Per cardiology note, patient to be anticoagulated after surgery, and plan for cardioversion.  Per Cardiology Note: Avoid volume overload - neg cardiovascular ROS   (+) Dyslipidemia, hypertension----. : . . . :. dysrhythmias a-fib, Irregular Heartbeat/Palpitations, . Previous cardiac testing Echodate:10/14/2019results:EF 60-65%Stress Testdate:10/15/2019  results:This is an exercise Cardiolite study on patient Ramy Guillermo ordered by  Dr. Melania Dubois for new onset A. fib.     The patient was placed upon the treadmill using the [place] protocol.  Went for a total time of 7 minutes into stage III at which time  discontinued secondary to fatigue. Resting heart was 70 tayla to 185.  Resting blood pressure 136/82 went to 185/85 for a double product of  30,000. He achieved 7 METs and 118% of maximum. Heart rate.     Review electrocardiogram shows the patient has atrial fibrillation  throughout the entire study. Baseline he is some nonspecific minor  ST-T changes in leads II, III, aVF and V5 and V6. He does develop some  increased ST depression in the same leads actually 1.5 to 2 mm in  leads II, III, aVF. Also has 1 mm in leads V5 and V6. Does have an  occasional PVC couplet.     ASSESSMENT: Exercise Cardiolite study which the patient was limited by  fatigue. Objectively adequate double product. Does develop A. Fib RVR.  Does have some ST depression in the inferior leads II, III, aVF and V5  and V6 suggestive of ischemia but the baseline presence of  abnormalities make it somewhat equivocal for ischemia. The Cardiolite  scans are pending.ECG reviewed date:10/17/2019 results:Atrial fibrillation with slow ventricular response (v rate 59) date: results:          METS/Exercise Tolerance:     Hematologic:         Musculoskeletal: Comment: Lumbar spondylosis and cervical disc disease and osteoarthritis and S/P right TKA  (+) arthritis,  -       GI/Hepatic: Comment: LPR    (+) GERD       Renal/Genitourinary: Comment: Urge incontinence        Endo:         Psychiatric:         Infectious Disease:         Malignancy:         Other:    - neg other ROS                      Physical Exam  Normal systems: cardiovascular and pulmonary    Airway   Mallampati: II  TM distance: >3 FB  Neck ROM: full    Dental     Cardiovascular   Rhythm and rate: regular and  normal      Pulmonary    breath sounds clear to auscultation            Lab Results   Component Value Date    WBC 9.1 04/13/2019    HGB 12.4 (L) 11/14/2019    HCT 37.6 (L) 09/19/2017     09/19/2017    SED 7 09/19/2017     09/19/2017    POTASSIUM 4.4 01/29/2019    CHLORIDE 106 09/19/2017    CO2 27 09/19/2017    BUN 25 09/19/2017    CR 0.99 01/29/2019     (H) 11/14/2019    RACHEL 9.1 09/19/2017    ALBUMIN 3.7 09/19/2017    PROTTOTAL 7.1 09/19/2017    ALT 19 01/29/2019    AST 23 01/29/2019    ALKPHOS 102 09/19/2017    BILITOTAL 0.5 09/19/2017    INR 0.96 02/26/2013    TSH 1.35 09/19/2017       Preop Vitals  BP Readings from Last 3 Encounters:   11/14/19 168/94   04/13/19 171/87   09/04/18 (!) 203/141    Pulse Readings from Last 3 Encounters:   11/14/19 69   04/12/19 72   09/04/18 80      Resp Readings from Last 3 Encounters:   11/14/19 18   04/13/19 16   09/04/18 13    SpO2 Readings from Last 3 Encounters:   11/14/19 99%   04/13/19 95%   09/04/18 97%      Temp Readings from Last 1 Encounters:   11/14/19 98.3  F (36.8  C) (Tympanic)    Ht Readings from Last 1 Encounters:   11/13/19 1.829 m (6')      Wt Readings from Last 1 Encounters:   11/13/19 107 kg (236 lb)    Estimated body mass index is 32.01 kg/m  as calculated from the following:    Height as of this encounter: 1.829 m (6').    Weight as of this encounter: 107 kg (236 lb).       Anesthesia Plan      History & Physical Review  History and physical reviewed and following examination; no interval change.    ASA Status:  3 .        Plan for Peripheral Nerve Block          Postoperative Care  Postoperative pain management:  Peripheral nerve block (Single Shot).      Consents  Anesthetic plan, risks, benefits and alternatives discussed with:  Patient..                 ABRAHAM Diaz CRNA

## 2019-11-15 VITALS
BODY MASS INDEX: 31.97 KG/M2 | HEART RATE: 69 BPM | OXYGEN SATURATION: 96 % | HEIGHT: 72 IN | DIASTOLIC BLOOD PRESSURE: 84 MMHG | TEMPERATURE: 97.6 F | WEIGHT: 236 LBS | SYSTOLIC BLOOD PRESSURE: 112 MMHG | RESPIRATION RATE: 18 BRPM

## 2019-11-15 LAB
GLUCOSE SERPL-MCNC: 126 MG/DL (ref 70–99)
HGB BLD-MCNC: 13 G/DL (ref 13.3–17.7)

## 2019-11-15 PROCEDURE — 25000132 ZZH RX MED GY IP 250 OP 250 PS 637: Performed by: NURSE PRACTITIONER

## 2019-11-15 PROCEDURE — 40000275 ZZH STATISTIC RCP TIME EA 10 MIN

## 2019-11-15 PROCEDURE — 82947 ASSAY GLUCOSE BLOOD QUANT: CPT | Performed by: ORTHOPAEDIC SURGERY

## 2019-11-15 PROCEDURE — 99239 HOSP IP/OBS DSCHRG MGMT >30: CPT | Mod: 24 | Performed by: NURSE PRACTITIONER

## 2019-11-15 PROCEDURE — 36415 COLL VENOUS BLD VENIPUNCTURE: CPT | Performed by: ORTHOPAEDIC SURGERY

## 2019-11-15 PROCEDURE — 25000132 ZZH RX MED GY IP 250 OP 250 PS 637: Performed by: ORTHOPAEDIC SURGERY

## 2019-11-15 PROCEDURE — 85018 HEMOGLOBIN: CPT | Performed by: ORTHOPAEDIC SURGERY

## 2019-11-15 RX ORDER — OXYCODONE HYDROCHLORIDE 5 MG/1
5-10 TABLET ORAL EVERY 4 HOURS PRN
Qty: 30 TABLET | Refills: 0 | Status: SHIPPED | OUTPATIENT
Start: 2019-11-15 | End: 2020-09-01

## 2019-11-15 RX ORDER — FERROUS SULFATE 325(65) MG
325 TABLET ORAL
COMMUNITY
Start: 2019-11-15

## 2019-11-15 RX ORDER — ASPIRIN 325 MG
325 TABLET, DELAYED RELEASE (ENTERIC COATED) ORAL DAILY
Qty: 28 TABLET | Refills: 0 | Status: SHIPPED | OUTPATIENT
Start: 2019-11-16 | End: 2019-12-14

## 2019-11-15 RX ADMIN — OXYBUTYNIN CHLORIDE 5 MG: 5 TABLET ORAL at 09:14

## 2019-11-15 RX ADMIN — OXYCODONE HYDROCHLORIDE 5 MG: 5 TABLET ORAL at 03:08

## 2019-11-15 RX ADMIN — ASPIRIN 325 MG: 325 TABLET, DELAYED RELEASE ORAL at 09:15

## 2019-11-15 RX ADMIN — OMEPRAZOLE 40 MG: 20 CAPSULE, DELAYED RELEASE ORAL at 09:14

## 2019-11-15 RX ADMIN — OXYCODONE HYDROCHLORIDE 5 MG: 5 TABLET ORAL at 13:47

## 2019-11-15 RX ADMIN — LISINOPRIL 20 MG: 20 TABLET ORAL at 09:14

## 2019-11-15 RX ADMIN — OXYCODONE HYDROCHLORIDE 5 MG: 5 TABLET ORAL at 07:01

## 2019-11-15 ASSESSMENT — ACTIVITIES OF DAILY LIVING (ADL)
ADLS_ACUITY_SCORE: 11
ADLS_ACUITY_SCORE: 12
ADLS_ACUITY_SCORE: 11

## 2019-11-15 NOTE — PLAN OF CARE
Patient discharged at 4:45 PM via wheel chair accompanied by spouse and staff. All belongings sent with patient.

## 2019-11-15 NOTE — DISCHARGE INSTRUCTIONS
You have a follow up appointment on Wednesday 11/20/19  with Dr. Guillory at [facility]. Check-in time 10:15am. If you need to reschedule please call [phone]

## 2019-11-15 NOTE — PLAN OF CARE
Reason for hospital stay:  Left shoulder replacement    Most recent vitals: /62   Pulse 69   Temp 100  F (37.8  C) (Tympanic)   Resp 18   Ht 1.829 m (6')   Wt 107 kg (236 lb)   SpO2 93%   BMI 32.01 kg/m      Pain Management:  Patient received numbing block from anesthesia prior to shift. Has reported no pain this shift. Received acetaminophen x1 this shift for temp of 101.2. Decreased to 100 upon reassessment.     Orientation:  Alert and oriented x 4.     Cardiac:  HRR.     Respiratory:  Lungs clear in all bases.     GI:  Bowel sounds active in all quadrants. No n/v.    :  Up ad ronald using bathroom. No urinary issues verbalized by patient.    Nutrition:  Regular diet    Skin Issues:  Has aquacel dressing to left shoulder. UTV. Shadowing present, no extending this shift. CMS intact.     IVF:  Saline lock    ABX:  None    Mobility:  Up ad ronald. Steady and balanced.     Safety:  Non-skid socks on. Able to make needs known.         11/14/2019  10:26 PM  Angelica Mcrae RN

## 2019-11-15 NOTE — PLAN OF CARE
Reason for hospital stay:  Left Shoulder Arthoplasty    Most recent vitals: /84   Pulse 69   Temp 97.6  F (36.4  C) (Tympanic)   Resp 18   Ht 1.829 m (6')   Wt 107 kg (236 lb)   SpO2 96%   BMI 32.01 kg/m    Pain Management:  Complains of left shoulder discomfort 3-4/10. PRN oxy 5 mg given  Orientation:  A/O x4, mood and behavior appropriate  Cardiac:  Heart rate irregular(a.fib)  Respiratory: LS clear, pt denies any SOB  GI:  bowel tones active x4 quads, no nausea verbalized  :  No difficulties voiding  Diet: Regular diet  Skin Issues:  Left Shoulder incision, covered with Aquacel drsg with small marked shadowing unchanged  IVF:  IV is  R hand SL  ABX:  None administered this shift  Mobility:  Independent in room with transfers, steady with balance and ambulation  Safety:  Call light within reach, personal items placed in reach on pt right side, non-skid socks    Comments:    11/15/2019  2:15 PM  Lobo Kaye RN

## 2019-11-15 NOTE — PLAN OF CARE
Discharge instructions reviewed with patient. Listed belongings gathered and returned to patient. Yes    Patient discharged to home.   Report called to N/A    Core Measures and Vaccines  Core Measures applicable during stay: Yes. If yes, state diagnosis: Shoulder Replacement   Pneumonia and Influenza given prior to discharge, if indicated: N/A    Surgical Patient   Surgical Procedures during stay: Yes  Did patient receive discharge instruction on wound care and recognition of infection symptoms? Yes    MISC  Follow up appointment made:  Yes  Home and hospital aquired medications returned to patient: N/A  Patient reports pain was well managed at discharge: Yes

## 2019-11-15 NOTE — PLAN OF CARE
Pt is A&O, independent, regular diet.  VS BP 90/59 then re-check was 121/75 HR 80 RR 16 T 98.5 O2 95% on RA, c/o minimal discomfort in left shoulder.  Pt states the block has helped tremendously, does have numbness in his phalanges though.  Pt was medicated preemptively w/ PRN Oxy 5 mg x3 this shift w/ noted relief.  Ice pack to shoulder for comfort, sling in place, aquacell CDI, shadowing to dressing is within boarders.  CMS intact besides numbness in LUE phalanges. Lungs CTA, bowels active x4, passing flatus.  Voiding adequately, denies N/V.  IV is SL, flushes well.  Brakes locked, call light within reach, makes needs known.  Frequent rounding done, free from falls.      Face to face report given with opportunity to observe patient.    Report given to Laureen Diamond RN    Belen Waldron RN   11/15/2019  5:28 AM

## 2019-11-15 NOTE — PLAN OF CARE
Pt is safe and independent with all mobility at this time. Plans to begin outpatient PT for shoulder once okayed by surgeon. Will complete PT order.

## 2019-11-15 NOTE — DISCHARGE SUMMARY
[facility]    Discharge Summary  Hospitalist    Date of Admission:  11/13/2019  Date of Discharge:  11/15/2019  Discharging Provider: America Cruz CNP  Date of Service (when I saw the patient): 11/15/19    Discharge Diagnoses   S/P left total shoulder arthroplasty  Essential hypertension  GERD  Overactive bladder      History of Present Illness   Ramy Guillermo is an 64 year old male who presented for scheduled left total shoulder arthroplasty.    Hospital Course   Ramy Guillermo was admitted on 11/13/2019.  The following problems were addressed during his hospitalization:      S/P shoulder replacement, left: Please see operative note for specifics. Pain control was problematic POD1.5 when block wore off. He received a second block late afternoon 11/14 which has been extremely helpful. He will continue with oral oxycodone and tylenol at home. Follow-up with Dr. Guillory in 7-10 days.       Essential hypertension, benign: running elevated when pain poorly controlled, now better controlled on home medications-no changes made.      GERD (gastroesophageal reflux disease): Advised to notify if he develops worsening symptoms as he made need higher dose while on ASA x1 month.         America Cruz CNP      Unresulted Labs Ordered in the Past 30 Days of this Admission     No orders found from 10/14/2019 to 11/14/2019.          Code Status   Full Code       Primary Care Physician   Mary Wooten        Discharge Disposition   Discharged to home  Condition at discharge: Stable    Consultations This Hospital Stay   OCCUPATIONAL THERAPY ADULT IP CONSULT  PHYSICAL THERAPY ADULT IP CONSULT  HOSPITALIST IP CONSULT    Time Spent on this Encounter   I, America Cruz NP, personally saw the patient today and spent greater than 30 minutes discharging this patient.    Discharge Orders      Reason for your hospital stay    Left total shoulder arthroplasty     Follow-up and recommended labs and tests      Follow up with Dr. Guillory , at [facility], within 7-10days to evaluate after surgery. No follow up labs or test are needed.     Activity    Your activity upon discharge: Follow restrictions as given by Dr. Guillory     When to contact your care team    Call your primary doctor if you have any of the following: fever, redness, drainage from wound.     Wound care and dressings    Instructions to care for your wound at home: Keep current dressing in place unless it becomes saturated or comes loose. If it does need to become loose remove and dress daily with gauze dressing. May shower and pat dry with current bandage on, no tub soaking or sauna's while bandage in place.     Full Code     Diet    Follow this diet upon discharge: Orders Placed This Encounter      Advance Diet as Tolerated: Regular Diet Adult     Discharge Medications   Current Discharge Medication List      START taking these medications    Details   aspirin (ASA) 325 MG EC tablet Take 1 tablet (325 mg) by mouth daily for 28 days  Qty: 28 tablet, Refills: 0    Associated Diagnoses: S/P shoulder replacement, left         CONTINUE these medications which have CHANGED    Details   oxyCODONE (ROXICODONE) 5 MG tablet Take 1-2 tablets (5-10 mg) by mouth every 4 hours as needed for moderate to severe pain  Qty: 30 tablet, Refills: 0    Associated Diagnoses: Status post total left knee replacement         CONTINUE these medications which have NOT CHANGED    Details   acetaminophen (TYLENOL) 325 MG tablet Take 2 tablets by mouth every 6 hours as needed.      ferrous sulfate (FEROSUL) 325 (65 Fe) MG tablet Take 1 tablet (325 mg) by mouth daily (with breakfast)      ibuprofen (ADVIL/MOTRIN) 200 MG tablet Take 200 mg by mouth every 6 hours as needed for moderate pain       lisinopril (PRINIVIL,ZESTRIL) 20 MG tablet Take 1 tablet (20 mg) by mouth daily for blood pressure.  Qty: 90 tablet, Refills: 1    Associated Diagnoses: Essential hypertension, benign       medical cannabis (Patient's own supply) 1 Dose See Admin Instructions (The purpose of this order is to document that the patient reports taking medical cannabis.  This is not a prescription, and is not used to certify that the patient has a qualifying medical condition.) Uses a topical, oral liquid and capsules from [employer]. Uses topical twice daily on his knee, and oral liquid at bedtime for pain management. Used capsules awhile back but doesn't have any more.      Multiple Vitamins-Minerals (MULTIVITAMIN ADULTS 50+ PO) Take 1 tablet by mouth daily      naproxen sodium (ALEVE) 220 MG tablet Take 220 mg by mouth 2 times daily as needed for moderate pain       Omega-3 Fatty Acids (FISH OIL) 600 MG CAPS Take 600 mg by mouth daily      omeprazole (PRILOSEC) 40 MG capsule Take 1 capsule (40 mg) by  mouth daily before a meal  for reflux.  Qty: 90 capsule, Refills: 1    Associated Diagnoses: GERD (gastroesophageal reflux disease)      oxybutynin (DITROPAN) 5 MG tablet Take 10 mg by mouth daily      pramipexole (MIRAPEX) 0.5 MG tablet Take 0.5 mg by mouth At Bedtime       sildenafil (VIAGRA) 100 MG tablet Take 0.5-1 tablets ( mg) by mouth daily as needed for erectile dysfunction (1-3 hours before intimacy) Maximum 1 dose per 24 hours.  Qty: 30 tablet, Refills: 12    Comments: May dispense 90-day supply w/ 3 refills per pt request.  Associated Diagnoses: Erectile dysfunction      vitamin D3 (CHOLECALCIFEROL) 10 MCG (400 UNIT) capsule Take 1 capsule by mouth daily      zolpidem (AMBIEN) 10 MG tablet Take 0.5-1 tablets (5-10 mg) by mouth nightly as needed for sleep (take right at bedtime)  Qty: 90 tablet, Refills: 1    Associated Diagnoses: Insomnia      PROAIR  (90 Base) MCG/ACT inhaler Inhale 1 puff into the lungs every 4 hours as needed  Refills: 0    Comments: Pharmacy may dispense brand covered by insurance (Proair, or proventil or ventolin or generic albuterol inhaler)      senna-docusate  (SENOKOT-S/PERICOLACE) 8.6-50 MG tablet Take 1 tablet by mouth 2 times daily as needed for constipation    Associated Diagnoses: Status post total left knee replacement         STOP taking these medications       Ferrous Sulfate 324 (65 Fe) MG TBEC Comments:   Reason for Stopping:             Allergies   Allergies   Allergen Reactions     No Known Drug Allergies      Data   Most Recent 3 CBC's:  Recent Labs   Lab Test 11/15/19  0525 11/14/19  0517 04/13/19  0555  09/19/17  0820 04/09/16  2147   WBC  --   --  9.1  --  5.3 4.3   HGB 13.0* 12.4* 12.3*   < > 13.0* 10.9*   MCV  --   --   --   --  87 76*   PLT  --   --   --   --  269 230    < > = values in this interval not displayed.      Most Recent 3 BMP's:  Recent Labs   Lab Test 11/15/19  0525 11/14/19  0517 04/12/19  0513 01/29/19 09/19/17  0820 06/05/15  0734 09/08/14  0753   NA  --   --   --   --  141 140 137   POTASSIUM  --   --   --  4.4 4.3 4.5 4.5   CHLORIDE  --   --   --   --  106 104 103   CO2  --   --   --   --  27 27 27   BUN  --   --   --   --  25 18 19   CR  --   --   --  0.99 0.88 1.10 1.06   ANIONGAP  --   --   --   --  8 9 7   RACHEL  --   --   --   --  9.1 9.4 9.2   * 132* 123* 75 76 90 93     Most Recent 2 LFT's:  Recent Labs   Lab Test 01/29/19 09/19/17  0820   AST 23 21   ALT 19 32   ALKPHOS  --  102   BILITOTAL  --  0.5     Most Recent INR's and Anticoagulation Dosing History:  Anticoagulation Dose History     Recent Dosing and Labs Latest Ref Rng & Units 2/26/2013    INR 0.86 - 1.14 0.96        Most Recent 3 Troponin's:No lab results found.  Most Recent Cholesterol Panel:  Recent Labs   Lab Test 09/19/17  0820   CHOL 195   *   HDL 63   TRIG 80     Most Recent 6 Bacteria Isolates From Any Culture (See EPIC Reports for Culture Details):  Recent Labs   Lab Test 08/27/19  1055   CULT No growth     Most Recent TSH, T4 and A1c Labs:  Recent Labs   Lab Test 09/19/17  0820   TSH 1.35     Results for orders placed or performed during the  hospital encounter of 11/13/19   XR Shoulder Left Port G/E 2 Views    Narrative    XR SHOULDER LT PORT G/E 2 VW, 11/13/2019 12:07 PM    History: Male, age 64 years; S/P L TSA    Comparison: Left shoulder x-ray 7/26/2019    Technique: Two views were obtained.    FINDINGS: The metallic arthroplasty device is satisfactorily  positioned. Surrounding bone and overlying soft tissues appear grossly  intact.      Impression    IMPRESSION:   1.  Status post metallic arthroplasty. No acute complication.    PEÑA CARTER MD

## 2019-11-16 ENCOUNTER — TELEPHONE (OUTPATIENT)
Dept: CASE MANAGEMENT | Facility: HOSPITAL | Age: 64
End: 2019-11-16

## 2019-12-04 ENCOUNTER — HOSPITAL ENCOUNTER (OUTPATIENT)
Dept: PHYSICAL THERAPY | Facility: HOSPITAL | Age: 64
Setting detail: THERAPIES SERIES
End: 2019-12-04
Attending: ORTHOPAEDIC SURGERY
Payer: COMMERCIAL

## 2019-12-04 PROCEDURE — 97110 THERAPEUTIC EXERCISES: CPT | Mod: GP

## 2019-12-04 PROCEDURE — 97161 PT EVAL LOW COMPLEX 20 MIN: CPT | Mod: GP

## 2019-12-04 NOTE — PROGRESS NOTES
Initial Physical Therapy Evaluation      Name: Ramy Guillermo MRN# [id number]   Age: 64 year old YOB: 1955     Date of Consultation: December 4, 2019  Primary care provider: Mary Wooten    Referring Physician: Dr. Guillory  Orders: Eval and Treat  Medical Diagnosis: L TSA  Onset of Illness/Injury: 11/14/19    Reason for PT Visit: Patient is a 63 y/o male who presents with a left total shoulder arthroplasty on 11/13/19 with Dr. Guillory. Is 3 weeks post op today. Has been slowly weaning off his sling. Pain level has been minimal. Feels pretty good today. Has only been using tylenol for pain control. Did have his R shoulder replaced roughly 2 years ago, so is aware of rehab and recovery process. Had been dealing with shoulder pain for many years prior to replacement surgery. Patient is R handed. Has a history of both knees and both hips replaced. Did go through cardiovascular testing and found out that he has A-fib prior to surgery on L shoulder.    Meets with Dr. Guillory again on 12/18/19 (5 weeks post op).     Prior Level of Function: n/a   Pain: 0-4/10     Community Support/Living Environment/Employment History: Retired     Patient/Family Goal: decrease pain    Fall Screen:   Have you fallen 2 or more times in the last year? No  Have you fallen and had an injury in the last year? No  Timed up & go:   Is patient a fall risk? No    Past Medical History:   Past Medical History:   Diagnosis Date     Allergic rhinitis      Certain adverse effects, not elsewhere classified, other     required more sedation preop R vein ablation     Colon adenoma 8/3/2012    colonoscopy due 2017      DJD (degenerative joint disease) of lumbar spine 2003    L4-L5 & L5-S1     Duodenal ulcer, unspecified as acute or chronic, without hemorrhage, perforation, or obstruction      Essential hypertension, benign 2006     GERD (gastroesophageal reflux disease)      PONV (postoperative nausea and vomiting)        Past Surgical  History:  Past Surgical History:   Procedure Laterality Date     ABLATE VEIN VARICOSE RADIO FREQUENCY WITH PHLEBECTOMY MULTIPLE STAB  10/7/11, 11/4/11    RT, LT Dr. Cruz Robert     ABLATE VEIN VARICOSE RADIO FREQUENCY WITH PHLEBECTOMY MULTIPLE STAB  10/7/2011    Procedure:ABLATE VEIN VARICOSE RADIO FREQUENCY WITH PHLEBECTOMY MULTIPLE STAB; Right Radio Frenquency with Greater Saphenous with  Phlebectomy Multiple Stab; Surgeon:CRUZ ROBERT; Location:[facility]     ABLATE VEIN VARICOSE RADIO FREQUENCY WITH PHLEBECTOMY MULTIPLE STAB  11/4/2011    Procedure:ABLATE VEIN VARICOSE RADIO FREQUENCY WITH PHLEBECTOMY MULTIPLE STAB; Left Radio Frenquency with Greater Saphenous Vein with  Bilateral Phlebectomy Multiple Stab Varicose Veins.; Surgeon:CRUZ ROBERT; Location:[facility]     ABLATE VEIN VARICOSE RADIO FREQUENCY WITH PHLEBECTOMY MULTIPLE STAB  11/28/2012    Procedure: ABLATE VEIN VARICOSE RADIO FREQUENCY WITH PHLEBECTOMY MULTIPLE STAB;   Bilateral stab Micro- phlebectomies;  Surgeon: Dany Mascorro MD;  Location: [facility]     ARTHROPLASTY KNEE Left 4/11/2019    Procedure: LEFT TOTAL KNEE ARTHROPLASTY/PERSONA;  Surgeon: Lamonte Cunha MD;  Location: [facility]     ARTHROPLASTY SHOULDER Left 11/13/2019    Procedure: LEFT TOTAL SHOULDER ARTHROPLASTY;  Surgeon: Alexandre Guillory MD;  Location: [facility]     ARTHROSCOPY KNEE  3/1/2013    Procedure: ARTHROSCOPY KNEE;  Right knee arthoscopy w/ medial menisectomy and chondroplasty;  Surgeon: Abiodun Jacobson MD;  Location: [facility]     C EXCIS BOWEL LESION(S),SINGLE ENTEROTOMY  1968    small intestine     C RECONSTR TOTAL SHOULDER IMPLANT Right 10/24/2017     COLONOSCOPY N/A 8/30/2016    Procedure: COLONOSCOPY;  Surgeon: Calos Post MD;  Location: [facility]     COLONOSCOPY N/A 12/8/2017    Procedure: COLONOSCOPY;  COLONOSCOPY;  Surgeon: Calos Post MD;  Location: [facility]     FLEXIBLE SIGMOIDOSCOPY  1/9/07     HC COLONOSCOPY W BIOPSY  8/3/12    proximal ascending, tubular adenoma       EXCISION OF UVULA  2001    for snoring      KNEE SCOPE,MED/LAT MENISECTOMY  7/11/05    RT + chondroplasty of medial femoral condyl & patellofemoral joint, Dr. Verma     HC PARTIAL REMOVAL/REPAIR,ACROMION  12/20/13    Left, [facility]     HC REPAIR BICEPS LONG TENDON  12/20/13    Left     HERNIA REPAIR, INGUINAL RT/LT  01/10/08    LT w/onlay mesh patch, Dr. Mchugh     JOINT REPLACEMENT, HIP RT/LT  9/06    RT hip RESURFACING arthroplasty, Dr. Ra Valdivia @[facility]       Medications:   Current Outpatient Medications   Medication Sig     acetaminophen (TYLENOL) 325 MG tablet Take 2 tablets by mouth every 6 hours as needed.     aspirin (ASA) 325 MG EC tablet Take 1 tablet (325 mg) by mouth daily for 28 days     ferrous sulfate (FEROSUL) 325 (65 Fe) MG tablet Take 1 tablet (325 mg) by mouth daily (with breakfast)     ibuprofen (ADVIL/MOTRIN) 200 MG tablet Take 200 mg by mouth every 6 hours as needed for moderate pain      lisinopril (PRINIVIL,ZESTRIL) 20 MG tablet Take 1 tablet (20 mg) by mouth daily for blood pressure.     medical cannabis (Patient's own supply) 1 Dose See Admin Instructions (The purpose of this order is to document that the patient reports taking medical cannabis.  This is not a prescription, and is not used to certify that the patient has a qualifying medical condition.) Uses a topical, oral liquid and capsules from [employer]. Uses topical twice daily on his knee, and oral liquid at bedtime for pain management. Used capsules awhile back but doesn't have any more.     Multiple Vitamins-Minerals (MULTIVITAMIN ADULTS 50+ PO) Take 1 tablet by mouth daily     naproxen sodium (ALEVE) 220 MG tablet Take 220 mg by mouth 2 times daily as needed for moderate pain      Omega-3 Fatty Acids (FISH OIL) 600 MG CAPS Take 600 mg by mouth daily     omeprazole (PRILOSEC) 40 MG capsule Take 1 capsule (40 mg) by  mouth daily before a meal  for reflux.     oxybutynin (DITROPAN) 5 MG tablet Take 10 mg by mouth daily      oxyCODONE (ROXICODONE) 5 MG tablet Take 1-2 tablets (5-10 mg) by mouth every 4 hours as needed for moderate to severe pain     pramipexole (MIRAPEX) 0.5 MG tablet Take 0.5 mg by mouth At Bedtime      PROAIR  (90 Base) MCG/ACT inhaler Inhale 1 puff into the lungs every 4 hours as needed     senna-docusate (SENOKOT-S/PERICOLACE) 8.6-50 MG tablet Take 1 tablet by mouth 2 times daily as needed for constipation     sildenafil (VIAGRA) 100 MG tablet Take 0.5-1 tablets ( mg) by mouth daily as needed for erectile dysfunction (1-3 hours before intimacy) Maximum 1 dose per 24 hours.     vitamin D3 (CHOLECALCIFEROL) 10 MCG (400 UNIT) capsule Take 1 capsule by mouth daily     zolpidem (AMBIEN) 10 MG tablet Take 0.5-1 tablets (5-10 mg) by mouth nightly as needed for sleep (take right at bedtime)     No current facility-administered medications for this encounter.        Musculoskeletal Findings:     OBJECTIVE   Observation: Patient appears to PT in to acute distress  Palpation: Incision site is well healing with no signs of infecion    Posture: forward head, protracted shoulders  Sitting Posture: Fair   Standing Posture: Fair    Neurological Assessment:   -Deferred secondary to no neurological symptoms    Range of Motion/Strength:   Cervical range of motion: within normal limits    Shoulder Range of Motion and Strength    RIGHT Shoulder ROM (Active)  Flexion: 125 degrees  Abduction: 155 degres   Internal Rotation:   Functional IR:  WNLs  External Rotation:   Functional ER:      LEFT Shoulder ROM (Active)  -Deferred secondary to post op status    STRENGTH                                                 Right                       Left  Flexion:                              5/5                           Extension:                          5/5                           IR:                                      5/5                           ER:                                     5/5                           Abduction:                          5/5                             Special Tests:  -Deferred     Short-term goals:  To be achieved in 2-3 weeks:  Instruct in home program    1.) Patient will understand biomechanical stressors of the shoulder joint in order to make modifications to activities to reduce further risk of injury.  2.) Patient will be independent with a short-term home exercise program.  3.) Patient will report 25% improvement of overall symptoms to allow decreased shoulder pain with daily movement and activity.       Long-term goals:  To be achieved in 10 weeks:  Self management of symptoms  Pain free activities of daily living  Return to previous level of function    1.) Improve score on Shoulder Pain and Disability Index by 13 points to correlate with clinically significant change.  2.) Patient will report 50% improvement of overall symptoms to allow decreased shoulder pain with daily movement and activity  3.) Patient will be able to reach to roughly 120 degrees of L shoulder scaption to allow increased ability to reach into cupboards and grab objects.    Discharge goals: Patient will be independent with a home program for self-management of symptoms.    Outcome Measures:   SPADI: 20%    Prognosis/Plan of Care: Good  Appropriate for Physical Therapy Intervention: Yes       Planned Interventions: Therapeutic exercise, manual therapy, therapeutic activity, patient education    Patient presents today with signs and symptoms consistent of L total shoulder arthroplasty with Dr. Guillory on 11/13/19. Therapy today consisted of evaluation, patient education, and therapeutic exercise. Patient would benefit from continued PT sessions addressing overall pain and dysfunction with use of appropriate interventions. Will follow Dr. Guillory's TSA rotator cuff intact protocol guideline    Clinical Impressions:  Criteria for Skilled Therapeutic Intervention Met: Yes  PT Diagnosis: L total shoulder arthroplasy  Influenced by the  following impairments: pain, weakness, decreased mobility  Functional limitations due to impairment: difficulty reaching, lifting, and performing daily movements and activity  Clinical presentation: Stable/Uncomplicated  Clinical presentation rationale: clinical judgement  Clinical Decision making (complexity): Low Complexity  Predicted Duration of Therapy Intervention (days/wks): 8-10 weeks  Risks and Benefits of therapy have been explained: Yes  Patient, Family & other staff in agreement with plan of care: Yes  Comments:       Total Evaluation Time: 45 minutes

## 2019-12-06 ENCOUNTER — HOSPITAL ENCOUNTER (OUTPATIENT)
Dept: PHYSICAL THERAPY | Facility: HOSPITAL | Age: 64
Setting detail: THERAPIES SERIES
End: 2019-12-06
Attending: ORTHOPAEDIC SURGERY
Payer: COMMERCIAL

## 2019-12-06 PROCEDURE — 97110 THERAPEUTIC EXERCISES: CPT | Mod: GP

## 2019-12-10 ENCOUNTER — HOSPITAL ENCOUNTER (OUTPATIENT)
Dept: PHYSICAL THERAPY | Facility: HOSPITAL | Age: 64
Setting detail: THERAPIES SERIES
End: 2019-12-10
Attending: ORTHOPAEDIC SURGERY
Payer: COMMERCIAL

## 2019-12-10 PROCEDURE — 97110 THERAPEUTIC EXERCISES: CPT | Mod: GP

## 2019-12-13 ENCOUNTER — HOSPITAL ENCOUNTER (OUTPATIENT)
Dept: PHYSICAL THERAPY | Facility: HOSPITAL | Age: 64
Setting detail: THERAPIES SERIES
End: 2019-12-13
Attending: FAMILY MEDICINE
Payer: COMMERCIAL

## 2019-12-13 PROCEDURE — 97110 THERAPEUTIC EXERCISES: CPT | Mod: GP

## 2019-12-17 ENCOUNTER — HOSPITAL ENCOUNTER (OUTPATIENT)
Dept: PHYSICAL THERAPY | Facility: HOSPITAL | Age: 64
Setting detail: THERAPIES SERIES
End: 2019-12-17
Attending: FAMILY MEDICINE
Payer: COMMERCIAL

## 2019-12-17 PROCEDURE — 97110 THERAPEUTIC EXERCISES: CPT | Mod: GP

## 2019-12-17 NOTE — PROGRESS NOTES
Outpatient Physical Therapy Progress Note     Patient: Ramy Guillermo  : 1955    Beginning/End Dates of Reporting Period:  19 to 2019    Referring Provider: Dr. Guillory    Therapy Diagnosis: Left total shoulder arthroplasty     Client Self Report: Patient reports today for L TSA rehab. States that things have been feeling good. Has noticed a little pus coming out of incision area. Redness and swelling around scar has improved a lot over the weekend. Meets with Dr. Guillory tomorrow. Pain level has been minimal.    Objective Measurements:  L passive mobility - flexion/scaption to 140 degrees, ER/IR to within tolerance (45 degrees ER), abduction to 90 degrees, no passive extension currently.    Goals:  Short-term goals:  To be achieved in 2-3 weeks:  Instruct in home program     1.) Patient will understand biomechanical stressors of the shoulder joint in order to make modifications to activities to reduce further risk of injury.  2.) Patient will be independent with a short-term home exercise program.  3.) Patient will report 25% improvement of overall symptoms to allow decreased shoulder pain with daily movement and activity.        Long-term goals:  To be achieved in 10 weeks:  Self management of symptoms  Pain free activities of daily living  Return to previous level of function     1.) Improve score on Shoulder Pain and Disability Index by 13 points to correlate with clinically significant change.  2.) Patient will report 50% improvement of overall symptoms to allow decreased shoulder pain with daily movement and activity  3.) Patient will be able to reach to roughly 120 degrees of L shoulder scaption to allow increased ability to reach into cupboards and grab objects.     Assessment:  Patient is making nice progress in terms of ROM movements and has virtual no pain with exercises at this time. Patient is currently performing active assistive motions and therapy will slowly progress patient's  ability to tolerate active movements, then slowly transition into strengthening activities as able. Patient to meet with Dr. Guillory tomorrow.      Plan:  Continue therapy per current plan of care.    Discharge:  No

## 2019-12-19 ENCOUNTER — HOSPITAL ENCOUNTER (OUTPATIENT)
Dept: PHYSICAL THERAPY | Facility: HOSPITAL | Age: 64
Setting detail: THERAPIES SERIES
End: 2019-12-19
Attending: FAMILY MEDICINE
Payer: COMMERCIAL

## 2019-12-19 PROCEDURE — 97110 THERAPEUTIC EXERCISES: CPT | Mod: GP

## 2019-12-24 ENCOUNTER — HOSPITAL ENCOUNTER (OUTPATIENT)
Dept: PHYSICAL THERAPY | Facility: HOSPITAL | Age: 64
Setting detail: THERAPIES SERIES
End: 2019-12-24
Attending: FAMILY MEDICINE
Payer: COMMERCIAL

## 2019-12-24 PROCEDURE — 97110 THERAPEUTIC EXERCISES: CPT | Mod: GP

## 2019-12-27 ENCOUNTER — HOSPITAL ENCOUNTER (OUTPATIENT)
Dept: PHYSICAL THERAPY | Facility: HOSPITAL | Age: 64
Setting detail: THERAPIES SERIES
End: 2019-12-27
Attending: FAMILY MEDICINE
Payer: COMMERCIAL

## 2019-12-27 PROCEDURE — 97110 THERAPEUTIC EXERCISES: CPT | Mod: GP

## 2019-12-31 ENCOUNTER — HOSPITAL ENCOUNTER (OUTPATIENT)
Dept: PHYSICAL THERAPY | Facility: HOSPITAL | Age: 64
Setting detail: THERAPIES SERIES
End: 2019-12-31
Attending: FAMILY MEDICINE
Payer: COMMERCIAL

## 2019-12-31 PROCEDURE — 97110 THERAPEUTIC EXERCISES: CPT | Mod: GP

## 2020-01-07 ENCOUNTER — HOSPITAL ENCOUNTER (OUTPATIENT)
Dept: PHYSICAL THERAPY | Facility: HOSPITAL | Age: 65
Setting detail: THERAPIES SERIES
End: 2020-01-07
Attending: FAMILY MEDICINE
Payer: COMMERCIAL

## 2020-01-07 PROCEDURE — 97110 THERAPEUTIC EXERCISES: CPT | Mod: GP

## 2020-01-09 ENCOUNTER — HOSPITAL ENCOUNTER (OUTPATIENT)
Dept: PHYSICAL THERAPY | Facility: HOSPITAL | Age: 65
Setting detail: THERAPIES SERIES
End: 2020-01-09
Attending: FAMILY MEDICINE
Payer: COMMERCIAL

## 2020-01-09 PROCEDURE — 97110 THERAPEUTIC EXERCISES: CPT | Mod: GP

## 2020-01-14 ENCOUNTER — HOSPITAL ENCOUNTER (OUTPATIENT)
Dept: PHYSICAL THERAPY | Facility: HOSPITAL | Age: 65
Setting detail: THERAPIES SERIES
End: 2020-01-14
Attending: ORTHOPAEDIC SURGERY
Payer: COMMERCIAL

## 2020-01-14 PROCEDURE — 97110 THERAPEUTIC EXERCISES: CPT | Mod: GP

## 2020-01-16 ENCOUNTER — HOSPITAL ENCOUNTER (OUTPATIENT)
Dept: PHYSICAL THERAPY | Facility: HOSPITAL | Age: 65
Setting detail: THERAPIES SERIES
End: 2020-01-16
Attending: ORTHOPAEDIC SURGERY
Payer: COMMERCIAL

## 2020-01-16 PROCEDURE — 97110 THERAPEUTIC EXERCISES: CPT | Mod: GP

## 2020-01-22 ENCOUNTER — HOSPITAL ENCOUNTER (OUTPATIENT)
Dept: PHYSICAL THERAPY | Facility: HOSPITAL | Age: 65
Setting detail: THERAPIES SERIES
End: 2020-01-22
Attending: ORTHOPAEDIC SURGERY
Payer: COMMERCIAL

## 2020-01-22 PROCEDURE — 97110 THERAPEUTIC EXERCISES: CPT | Mod: GP

## 2020-01-24 ENCOUNTER — HOSPITAL ENCOUNTER (OUTPATIENT)
Dept: PHYSICAL THERAPY | Facility: HOSPITAL | Age: 65
Setting detail: THERAPIES SERIES
End: 2020-01-24
Attending: ORTHOPAEDIC SURGERY
Payer: COMMERCIAL

## 2020-01-24 PROCEDURE — 97110 THERAPEUTIC EXERCISES: CPT | Mod: GP

## 2020-01-28 ENCOUNTER — HOSPITAL ENCOUNTER (OUTPATIENT)
Dept: PHYSICAL THERAPY | Facility: HOSPITAL | Age: 65
Setting detail: THERAPIES SERIES
End: 2020-01-28
Attending: ORTHOPAEDIC SURGERY
Payer: COMMERCIAL

## 2020-01-28 PROCEDURE — 97110 THERAPEUTIC EXERCISES: CPT | Mod: GP

## 2020-01-30 ENCOUNTER — HOSPITAL ENCOUNTER (OUTPATIENT)
Dept: PHYSICAL THERAPY | Facility: HOSPITAL | Age: 65
Setting detail: THERAPIES SERIES
End: 2020-01-30
Attending: ORTHOPAEDIC SURGERY
Payer: COMMERCIAL

## 2020-01-30 PROCEDURE — 97110 THERAPEUTIC EXERCISES: CPT | Mod: GP

## 2020-01-30 NOTE — PROGRESS NOTES
Outpatient Physical Therapy Progress Note     Patient: Ramy Guillermo  : 1955    Beginning/End Dates of Reporting Period:  19 to 2020    Referring Provider: Dr. Guillory    Therapy Diagnosis: L shoulder replacement     Client Self Report: Patient reports today for L shoulder rehab. Reports that exercises and movements have been going well. No pain at all. Meets with Dr. Guillory on Monday 2/3/20.    Objective Measurements:  L shoulder active ROM: 115 degrees scaption, ER reach to C4 spinous process, 135 degrees scaption - active assistive      Outcome Measures (most recent score):  SPADI: 4.62% disability    Goals:  Short-term goals:  To be achieved in 2-3 weeks:  Instruct in home program     1.) Patient will understand biomechanical stressors of the shoulder joint in order to make modifications to activities to reduce further risk of injury.  2.) Patient will be independent with a short-term home exercise program.  3.) Patient will report 25% improvement of overall symptoms to allow decreased shoulder pain with daily movement and activity.    ALL ST goals MET        Long-term goals:  To be achieved in 10 weeks:  Self management of symptoms NOT MET  Pain free activities of daily living NOT MET  Return to previous level of function NOT MT     1.) Improve score on Shoulder Pain and Disability Index by 13 points to correlate with clinically significant change. MET  2.) Patient will report 50% improvement of overall symptoms to allow decreased shoulder pain with daily movement and activity MET  3.) Patient will be able to reach to roughly 120 degrees of L shoulder scaption to allow increased ability to reach into cupboards and grab objects. NOT MET    Assessment:  Patient is currently making nice progress with physical therapy. Patient is slowly improving active range of motion, responding well to lighter dumbbell work, and is transitioning into banded exercises. Patient has no pain, but does lack some end  range mobility and end range strength. Patient would benefit from continued PT to maximize shoulder ROM, strength, and stability with appropriate exercises. Patient to meet with Dr. Guillory on 2/3/20.    Plan:  Continue therapy per current plan of care.    Discharge:  No

## 2020-02-04 ENCOUNTER — HOSPITAL ENCOUNTER (OUTPATIENT)
Dept: PHYSICAL THERAPY | Facility: HOSPITAL | Age: 65
Setting detail: THERAPIES SERIES
End: 2020-02-04
Attending: ORTHOPAEDIC SURGERY
Payer: COMMERCIAL

## 2020-02-04 PROCEDURE — 97110 THERAPEUTIC EXERCISES: CPT | Mod: GP

## 2020-02-07 ENCOUNTER — HOSPITAL ENCOUNTER (OUTPATIENT)
Dept: PHYSICAL THERAPY | Facility: HOSPITAL | Age: 65
Setting detail: THERAPIES SERIES
End: 2020-02-07
Attending: ORTHOPAEDIC SURGERY
Payer: COMMERCIAL

## 2020-02-07 PROCEDURE — 97110 THERAPEUTIC EXERCISES: CPT | Mod: GP

## 2020-02-07 NOTE — PROGRESS NOTES
Outpatient Physical Therapy Discharge Note     Patient: Ramy Guillermo  : 1955    Beginning/End Dates of Reporting Period:  19 to 2020    Referring Provider: Dr. Guillory    Therapy Diagnosis: L shoulder replacement     Client Self Report: Patient reports today for L shoulder rehab. States that exercises have been going well. Reports that he met with Dr. Guillory has been pleased with his progress. Puts self at 95% improved today.    Objective Measurements:  127 degrees active L shoulder scaption    Outcome Measures (most recent score):  SPADI: 6.15    Goals:  Short-term goals:  To be achieved in 2-3 weeks:  Instruct in home program     1.) Patient will understand biomechanical stressors of the shoulder joint in order to make modifications to activities to reduce further risk of injury.  2.) Patient will be independent with a short-term home exercise program.  3.) Patient will report 25% improvement of overall symptoms to allow decreased shoulder pain with daily movement and activity.     ALL ST goals MET     Long-term goals:  To be achieved in 10 weeks:  Self management of symptoms MET  Pain free activities of daily living MET  Return to previous level of function MET     1.) Improve score on Shoulder Pain and Disability Index by 13 points to correlate with clinically significant change.  2.) Patient will report 50% improvement of overall symptoms to allow decreased shoulder pain with daily movement and activity  3.) Patient will be able to reach to roughly 120 degrees of L shoulder scaption to allow increased ability to reach into cupboards and grab objects.    ALL LT goals MET    Assessment:  Patient has improved overall L shoulder mobility, strength, and stability with daily movement and activity. Patient has no pain and will continue with HEP provided for effective management of symptoms outside of the clinic.    Plan:  Discharge from therapy.    Discharge:    Reason for Discharge: Patient has met  all goals.    Equipment Issued: none    Discharge Plan: Patient to continue home program.

## 2020-08-18 ENCOUNTER — OFFICE VISIT (OUTPATIENT)
Dept: AUDIOLOGY | Facility: OTHER | Age: 65
End: 2020-08-18
Attending: AUDIOLOGIST
Payer: COMMERCIAL

## 2020-08-18 DIAGNOSIS — H90.3 SENSORINEURAL HEARING LOSS, ASYMMETRICAL: Primary | ICD-10-CM

## 2020-08-18 PROCEDURE — 92557 COMPREHENSIVE HEARING TEST: CPT | Performed by: AUDIOLOGIST

## 2020-08-18 PROCEDURE — 92550 TYMPANOMETRY & REFLEX THRESH: CPT | Performed by: AUDIOLOGIST

## 2020-08-18 NOTE — PROGRESS NOTES
Audiology Evaluation Completed. Please refer SCANNED AUDIOGRAM and/or TYMPANOGRAM for BACKGROUND, RESULTS, RECOMMENDATIONS.      Dalia GAN, [facility]  Audiologist #[id number]

## 2020-08-27 PROBLEM — M19.011 PRIMARY OSTEOARTHRITIS OF RIGHT SHOULDER: Status: ACTIVE | Noted: 2017-05-30

## 2020-08-27 PROBLEM — Z96.651 HISTORY OF TOTAL KNEE ARTHROPLASTY, RIGHT: Status: ACTIVE | Noted: 2017-01-05

## 2020-09-01 ENCOUNTER — OFFICE VISIT (OUTPATIENT)
Dept: OTOLARYNGOLOGY | Facility: OTHER | Age: 65
End: 2020-09-01
Attending: NURSE PRACTITIONER
Payer: COMMERCIAL

## 2020-09-01 VITALS
BODY MASS INDEX: 31.74 KG/M2 | SYSTOLIC BLOOD PRESSURE: 110 MMHG | WEIGHT: 234 LBS | OXYGEN SATURATION: 98 % | TEMPERATURE: 96.7 F | HEART RATE: 53 BPM | DIASTOLIC BLOOD PRESSURE: 80 MMHG

## 2020-09-01 DIAGNOSIS — H93.13 TINNITUS, BILATERAL: ICD-10-CM

## 2020-09-01 DIAGNOSIS — H61.21 CERUMINOSIS, RIGHT: ICD-10-CM

## 2020-09-01 DIAGNOSIS — H90.3 ASNHL (ASYMMETRICAL SENSORINEURAL HEARING LOSS): Primary | ICD-10-CM

## 2020-09-01 PROCEDURE — 92504 EAR MICROSCOPY EXAMINATION: CPT | Performed by: NURSE PRACTITIONER

## 2020-09-01 PROCEDURE — 99213 OFFICE O/P EST LOW 20 MIN: CPT | Mod: 25 | Performed by: NURSE PRACTITIONER

## 2020-09-01 RX ORDER — FLECAINIDE ACETATE 50 MG/1
50 TABLET ORAL 2 TIMES DAILY
COMMUNITY

## 2020-09-01 ASSESSMENT — PAIN SCALES - GENERAL: PAINLEVEL: NO PAIN (0)

## 2020-09-01 NOTE — LETTER
9/1/2020         RE: Ramy Guillermo  [address] [facility] [address]        Dear Colleague,    Thank you for referring your patient, Ramy Guillermo, to the [facility]. Please see a copy of my visit note below.      Otolaryngology Note         Chief Complaint:     Patient presents with:  Hearing Problem: medical clearance           History of Present Illness:     Ramy Guillermo is a 65 year old male seen today for hearing loss.    This has been present for the last 10 years, he notes that left ear seems to be worse than right    There have been not been many infections.  The onset was gradual.    There is no otalgia or otorrhea.  He states it feels like his ears are plugged frequently.    He has CARYL, wears CPAP, uses ear buds at night  No vertigo  + tinnitus, intermittent, not bothersome  No facial numbness or tingling.   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    + family history of hearing loss, father and grandpa, siblings are starting to notice hearing loss    No prior audiograms  No recent inciting events  No history of nasal allergies or recurring sinus infections.      Audiogram completed 8/18/2020:   Tympanograms are Type A for both ears suggesting normal eardrum mobility.  Acoustic Reflex Thresholds at 1000 Hz are present for both ears.  Thresholds are normal sloping to moderate right, moderate-severe left rising to mild right and moderate left-asymmetrical sensorineural  hearing loss. Inserts did not close asymmetry but did improve.  Speech reception thresholds are in good agreement with pure tone average.  Word discrimination scores are excellent at supra-thresholds level/MCL.    Previous imaging:   No recent MRI Brain or IAC         Medications:      Current Outpatient Rx   Medication Sig Dispense Refill     apixaban ANTICOAGULANT (ELIQUIS) 5 MG tablet Take 5 mg by mouth 2 times daily       ferrous sulfate (FEROSUL) 325 (65 Fe) MG tablet Take 1 tablet (325 mg) by mouth daily (with breakfast)       flecainide (TAMBOCOR) 50 MG tablet Take 50 mg by mouth 2 times daily       ibuprofen (ADVIL/MOTRIN) 200 MG tablet Take 200 mg by mouth every 6 hours as needed for moderate pain        lisinopril (PRINIVIL,ZESTRIL) 20 MG tablet Take 1 tablet (20 mg) by mouth daily for blood pressure. 90 tablet 1     medical cannabis (Patient's own supply) 1 Dose See Admin Instructions (The purpose of this order is to document that the patient reports taking medical cannabis.  This is not a prescription, and is not used to certify that the patient has a qualifying medical condition.) Uses a topical, oral liquid and capsules from [employer]. Uses topical twice daily on his knee, and oral liquid at bedtime for pain management. Used capsules awhile back but doesn't have any more.       Multiple Vitamins-Minerals (MULTIVITAMIN ADULTS 50+ PO) Take 1 tablet by mouth daily       naproxen sodium (ALEVE) 220 MG tablet Take 220 mg by mouth 2 times daily as needed for moderate pain        Omega-3 Fatty Acids (FISH OIL) 600 MG CAPS Take 600 mg by mouth daily       omeprazole (PRILOSEC) 40 MG capsule Take 1 capsule (40 mg) by  mouth daily before a meal  for reflux. 90 capsule 1     oxybutynin (DITROPAN) 5 MG tablet Take 10 mg by mouth daily       pramipexole (MIRAPEX) 0.5 MG tablet Take 0.5 mg by mouth At Bedtime        PROAIR  (90 Base) MCG/ACT inhaler Inhale 1 puff into the lungs every 4 hours as needed  0     sildenafil (VIAGRA) 100 MG tablet Take 0.5-1 tablets ( mg) by mouth daily as needed for erectile dysfunction (1-3 hours before intimacy) Maximum 1 dose per 24 hours. 30 tablet 12     vitamin D3 (CHOLECALCIFEROL) 10 MCG (400 UNIT) capsule Take 1 capsule by mouth daily        zolpidem (AMBIEN) 10 MG tablet Take 0.5-1 tablets (5-10 mg) by mouth nightly as needed for sleep (take right at bedtime) 90 tablet 1            Allergies:     Allergies: No known drug allergies          Past Medical History:     Past Medical History:   Diagnosis Date     Allergic rhinitis      Certain adverse effects, not elsewhere classified, other     required more sedation preop R vein ablation     Colon adenoma 8/3/2012    colonoscopy due 2017      DJD (degenerative joint disease) of lumbar spine 2003    L4-L5 & L5-S1     Duodenal ulcer, unspecified as acute or chronic, without hemorrhage, perforation, or obstruction      Essential hypertension, benign 2006     GERD (gastroesophageal reflux disease)      PONV (postoperative nausea and vomiting)             Past Surgical History:     Past Surgical History:   Procedure Laterality Date     ABLATE VEIN VARICOSE RADIO FREQUENCY WITH PHLEBECTOMY MULTIPLE STAB  10/7/11, 11/4/11    RT, LT Dr. Cruz Sung     ABLATE VEIN VARICOSE RADIO FREQUENCY WITH PHLEBECTOMY MULTIPLE STAB  10/7/2011    Procedure:ABLATE VEIN VARICOSE RADIO FREQUENCY WITH PHLEBECTOMY MULTIPLE STAB; Right Radio Frenquency with Greater Saphenous with  Phlebectomy Multiple Stab; Surgeon:CRUZ SUNG; Location:[facility]     ABLATE VEIN VARICOSE RADIO FREQUENCY WITH PHLEBECTOMY MULTIPLE STAB  11/4/2011    Procedure:ABLATE VEIN VARICOSE RADIO FREQUENCY WITH PHLEBECTOMY MULTIPLE STAB; Left Radio Frenquency with Greater Saphenous Vein with  Bilateral Phlebectomy Multiple Stab Varicose Veins.; Surgeon:CRUZ SUNG; Location:[facility]     ABLATE VEIN VARICOSE RADIO FREQUENCY WITH PHLEBECTOMY MULTIPLE STAB  11/28/2012    Procedure: ABLATE VEIN VARICOSE RADIO FREQUENCY WITH PHLEBECTOMY MULTIPLE STAB;   Bilateral stab Micro- phlebectomies;  Surgeon: Dany Mascorro MD;  Location: [facility]     ARTHROPLASTY KNEE Left 4/11/2019    Procedure: LEFT TOTAL KNEE ARTHROPLASTY/PERSONA;  Surgeon: Lamonte Cunha MD;   Location: [facility]     ARTHROPLASTY SHOULDER Left 2019    Procedure: LEFT TOTAL SHOULDER ARTHROPLASTY;  Surgeon: Alexandre Guillory MD;  Location: [facility]     ARTHROSCOPY KNEE  3/1/2013    Procedure: ARTHROSCOPY KNEE;  Right knee arthoscopy w/ medial menisectomy and chondroplasty;  Surgeon: Abiodun Jacobson MD;  Location: [facility]     C EXCIS BOWEL LESION(S),SINGLE ENTEROTOMY  1968    small intestine     C RECONSTR TOTAL SHOULDER IMPLANT Right 10/24/2017     COLONOSCOPY N/A 2016    Procedure: COLONOSCOPY;  Surgeon: Calos Post MD;  Location: [facility]     COLONOSCOPY N/A 2017    Procedure: COLONOSCOPY;  COLONOSCOPY;  Surgeon: Calos Post MD;  Location: [facility]     FLEXIBLE SIGMOIDOSCOPY  07     HC COLONOSCOPY W BIOPSY  8/3/12    proximal ascending, tubular adenoma     HC EXCISION OF UVULA      for snoring     HC KNEE SCOPE,MED/LAT MENISECTOMY  05    RT + chondroplasty of medial femoral condyl & patellofemoral joint, Dr. Verma     HC PARTIAL REMOVAL/REPAIR,ACROMION  13    Left, [place]     HC REPAIR BICEPS LONG TENDON  13    Left     HERNIA REPAIR, INGUINAL RT/LT  01/10/08    LT w/onlay mesh patch, Dr. Mchugh     JOINT REPLACEMENT, HIP RT/LT      RT hip RESURFACING arthroplasty, Dr. Ra Valdivia @[facility]            Social History:     Social History     Tobacco Use     Smoking status: Former Smoker     Last attempt to quit: 2003     Years since quittin.6     Smokeless tobacco: Never Used   Substance Use Topics     Alcohol use: Yes     Comment: 3-4 /week     Drug use: No            Review of Systems:     ROS: See HPI         Physical Exam:     /80   Pulse 53   Temp 96.7  F (35.9  C) (Tympanic)   Wt 106.1 kg (234 lb)   SpO2 98%   BMI 31.74 kg/m       General - The patient is well nourished and well developed, and appears to have good nutritional status.  Alert and oriented to person and place, answers questions and cooperates with examination appropriately.   Head and  Face - Normocephalic and atraumatic, with no gross asymmetry noted.  The facial nerve is intact, with strong symmetric movements.  Voice and Breathing - The patient was breathing comfortably without the use of accessory muscles. There was no wheezing, stridor. The patients voice was clear and strong, and had appropriate pitch and quality.  Ears - The ears were examined under binocular microscopy and with otoscope.  External ear normal. Left canal is patent, right canal with ceruminosis, this was removed with cerumen loop and cupped forceps.  Right tympanic membrane is intact without effusion, retraction or mass. Left tympanic membrane is intact without effusion, retraction or mass.  Eyes - Extraocular movements intact, sclera were not icteric or injected, conjunctiva were pink and moist.  Mouth - Examination of the oral cavity showed pink, healthy oral mucosa. Dentition in good condition. No lesions or ulcerations noted. The tongue was mobile and midline.   Throat - The walls of the oropharynx were smooth, pink, moist, symmetric, and had no lesions or ulcerations.  The tonsillar pillars and soft palate were symmetric. S/P uvulectomy   Neck - Normal midline excursion of the laryngotracheal complex during swallowing.  Full range of motion on passive movement.  Palpation of the occipital, submental, submandibular, internal jugular chain, and supraclavicular nodes did not demonstrate any abnormal lymph nodes or masses.  Palpation of the thyroid was soft and smooth, with no nodules or goiter appreciated.  The trachea was mobile and midline.  Nose - External contour is symmetric, no gross deflection or scars.  Nasal mucosa is pink and moist with no abnormal mucus.  The septum and turbinates were evaluated: grossly normal.  No polyps, masses, or purulence noted on examination.         Assessment and Plan:       ICD-10-CM    1. ASNHL (asymmetrical sensorineural hearing loss)  H90.5    2. Tinnitus, bilateral  H93.13 MR  Internal Auditory Canal wo&w Contrast   3. Ceruminosis, right  H61.21 MR Internal Auditory Canal wo&w Contrast     Complete MRI IAC  Wear hearing protection when indicated  Once MRI complete, will medically clear for hearing aids    The current leading theory on tinnitus is that it originates from the central nervous system itself, and is usually associated with hearing loss and injury to cochlear hair cells.   Advancing age can accompany hearing loss and tinnitus.  If a person is younger without hearing loss, loud noise usually is the leading cause of tinnitus.  Delayed damage to hearing is often seen as well.  Ear protection from noise exposure is important to protect your hearing and decrease the risk for tinnitus.  You can also take steps to mask the noise, such as a low volume de-tuned radio, a fan in the background, a sound tevin, or hearing aids if indicated.  Ways to help prevent or decrease the symptoms of tinnitus include a low salt diet, control of hypertension, avoiding stimulants such as caffeine, tobacco or alcohol and proper sleep, exercise and stress management.  Tinnitus can also be worsened by stress, anxiety, depression, and high blood pressure.  I caution you against numerous expensive non-pharmaceutical options that are advertised, and have no proven benefit.      Please follow up as needed for worsening symptoms, changes in symptoms, or signs of infection.  I recommended yearly audiograms, and consideration of a hearing aid evaluation if indicated.  If there is any dizziness or facial weakness, call for a recheck.     Lori FUNES  [facility]  10:40 AM  September 1, 2020      Again, thank you for allowing me to participate in the care of your patient.        Sincerely,        Lori Back NP

## 2020-09-01 NOTE — PROGRESS NOTES
Otolaryngology Note         Chief Complaint:     Patient presents with:  Hearing Problem: medical clearance           History of Present Illness:     Ramy Guillermo is a 65 year old male seen today for hearing loss.    This has been present for the last 10 years, he notes that left ear seems to be worse than right    There have been not been many infections.  The onset was gradual.    There is no otalgia or otorrhea.  He states it feels like his ears are plugged frequently.    He has CARYL, wears CPAP, uses ear buds at night  No vertigo  + tinnitus, intermittent, not bothersome  No facial numbness or tingling.   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    + family history of hearing loss, father and grandpa, siblings are starting to notice hearing loss    No prior audiograms  No recent inciting events  No history of nasal allergies or recurring sinus infections.      Audiogram completed 8/18/2020:   Tympanograms are Type A for both ears suggesting normal eardrum mobility.  Acoustic Reflex Thresholds at 1000 Hz are present for both ears.  Thresholds are normal sloping to moderate right, moderate-severe left rising to mild right and moderate left-asymmetrical sensorineural  hearing loss. Inserts did not close asymmetry but did improve.  Speech reception thresholds are in good agreement with pure tone average.  Word discrimination scores are excellent at supra-thresholds level/MCL.    Previous imaging:   No recent MRI Brain or IAC         Medications:     Current Outpatient Rx   Medication Sig Dispense Refill     apixaban ANTICOAGULANT (ELIQUIS) 5 MG tablet Take 5 mg by mouth 2 times daily       ferrous sulfate (FEROSUL) 325 (65 Fe) MG tablet Take 1 tablet (325 mg) by mouth daily (with breakfast)        flecainide (TAMBOCOR) 50 MG tablet Take 50 mg by mouth 2 times daily       ibuprofen (ADVIL/MOTRIN) 200 MG tablet Take 200 mg by mouth every 6 hours as needed for moderate pain        lisinopril (PRINIVIL,ZESTRIL) 20 MG tablet Take 1 tablet (20 mg) by mouth daily for blood pressure. 90 tablet 1     medical cannabis (Patient's own supply) 1 Dose See Admin Instructions (The purpose of this order is to document that the patient reports taking medical cannabis.  This is not a prescription, and is not used to certify that the patient has a qualifying medical condition.) Uses a topical, oral liquid and capsules from [employer]. Uses topical twice daily on his knee, and oral liquid at bedtime for pain management. Used capsules awhile back but doesn't have any more.       Multiple Vitamins-Minerals (MULTIVITAMIN ADULTS 50+ PO) Take 1 tablet by mouth daily       naproxen sodium (ALEVE) 220 MG tablet Take 220 mg by mouth 2 times daily as needed for moderate pain        Omega-3 Fatty Acids (FISH OIL) 600 MG CAPS Take 600 mg by mouth daily       omeprazole (PRILOSEC) 40 MG capsule Take 1 capsule (40 mg) by  mouth daily before a meal  for reflux. 90 capsule 1     oxybutynin (DITROPAN) 5 MG tablet Take 10 mg by mouth daily       pramipexole (MIRAPEX) 0.5 MG tablet Take 0.5 mg by mouth At Bedtime        PROAIR  (90 Base) MCG/ACT inhaler Inhale 1 puff into the lungs every 4 hours as needed  0     sildenafil (VIAGRA) 100 MG tablet Take 0.5-1 tablets ( mg) by mouth daily as needed for erectile dysfunction (1-3 hours before intimacy) Maximum 1 dose per 24 hours. 30 tablet 12     vitamin D3 (CHOLECALCIFEROL) 10 MCG (400 UNIT) capsule Take 1 capsule by mouth daily       zolpidem (AMBIEN) 10 MG tablet Take 0.5-1 tablets (5-10 mg) by mouth nightly as needed for sleep (take right at bedtime) 90 tablet 1            Allergies:     Allergies: No known drug allergies          Past Medical History:     Past Medical History:    Diagnosis Date     Allergic rhinitis      Certain adverse effects, not elsewhere classified, other     required more sedation preop R vein ablation     Colon adenoma 8/3/2012    colonoscopy due 2017      DJD (degenerative joint disease) of lumbar spine 2003    L4-L5 & L5-S1     Duodenal ulcer, unspecified as acute or chronic, without hemorrhage, perforation, or obstruction      Essential hypertension, benign 2006     GERD (gastroesophageal reflux disease)      PONV (postoperative nausea and vomiting)             Past Surgical History:     Past Surgical History:   Procedure Laterality Date     ABLATE VEIN VARICOSE RADIO FREQUENCY WITH PHLEBECTOMY MULTIPLE STAB  10/7/11, 11/4/11    RT, LT Dr. Cruz Robert     ABLATE VEIN VARICOSE RADIO FREQUENCY WITH PHLEBECTOMY MULTIPLE STAB  10/7/2011    Procedure:ABLATE VEIN VARICOSE RADIO FREQUENCY WITH PHLEBECTOMY MULTIPLE STAB; Right Radio Frenquency with Greater Saphenous with  Phlebectomy Multiple Stab; Surgeon:CRUZ ROBERT; Location:[facility]     ABLATE VEIN VARICOSE RADIO FREQUENCY WITH PHLEBECTOMY MULTIPLE STAB  11/4/2011    Procedure:ABLATE VEIN VARICOSE RADIO FREQUENCY WITH PHLEBECTOMY MULTIPLE STAB; Left Radio Frenquency with Greater Saphenous Vein with  Bilateral Phlebectomy Multiple Stab Varicose Veins.; Surgeon:CRUZ ROBERT; Location:[facility]     ABLATE VEIN VARICOSE RADIO FREQUENCY WITH PHLEBECTOMY MULTIPLE STAB  11/28/2012    Procedure: ABLATE VEIN VARICOSE RADIO FREQUENCY WITH PHLEBECTOMY MULTIPLE STAB;   Bilateral stab Micro- phlebectomies;  Surgeon: Dany Mascorro MD;  Location:  OR     ARTHROPLASTY KNEE Left 4/11/2019    Procedure: LEFT TOTAL KNEE ARTHROPLASTY/PERSONA;  Surgeon: Lamonte Cunha MD;  Location: [facility]     ARTHROPLASTY SHOULDER Left 11/13/2019    Procedure: LEFT TOTAL SHOULDER ARTHROPLASTY;  Surgeon: Alexandre Guillory MD;  Location: [facility]     ARTHROSCOPY KNEE  3/1/2013    Procedure: ARTHROSCOPY KNEE;  Right knee arthoscopy w/ medial  menisectomy and chondroplasty;  Surgeon: Abiodun Jacobson MD;  Location: [facility] OR     C EXCIS BOWEL LESION(S),SINGLE ENTEROTOMY  1968    small intestine     C RECONSTR TOTAL SHOULDER IMPLANT Right 10/24/2017     COLONOSCOPY N/A 2016    Procedure: COLONOSCOPY;  Surgeon: Calos Post MD;  Location: [facility]     COLONOSCOPY N/A 2017    Procedure: COLONOSCOPY;  COLONOSCOPY;  Surgeon: Calos Post MD;  Location: [facility]     FLEXIBLE SIGMOIDOSCOPY  07     HC COLONOSCOPY W BIOPSY  8/3/12    proximal ascending, tubular adenoma     HC EXCISION OF UVULA      for snoring     HC KNEE SCOPE,MED/LAT MENISECTOMY  05    RT + chondroplasty of medial femoral condyl & patellofemoral joint, Dr. Verma     HC PARTIAL REMOVAL/REPAIR,ACROMION  13    Left, [place]     HC REPAIR BICEPS LONG TENDON  13    Left     HERNIA REPAIR, INGUINAL RT/LT  01/10/08    LT w/onlay mesh patch, Dr. Mchugh     JOINT REPLACEMENT, HIP RT/LT      RT hip RESURFACING arthroplasty, Dr. Ra Valdivia @[facility]            Social History:     Social History     Tobacco Use     Smoking status: Former Smoker     Last attempt to quit: 2003     Years since quittin.6     Smokeless tobacco: Never Used   Substance Use Topics     Alcohol use: Yes     Comment: 3-4 /week     Drug use: No            Review of Systems:     ROS: See HPI         Physical Exam:     /80   Pulse 53   Temp 96.7  F (35.9  C) (Tympanic)   Wt 106.1 kg (234 lb)   SpO2 98%   BMI 31.74 kg/m       General - The patient is well nourished and well developed, and appears to have good nutritional status.  Alert and oriented to person and place, answers questions and cooperates with examination appropriately.   Head and Face - Normocephalic and atraumatic, with no gross asymmetry noted.  The facial nerve is intact, with strong symmetric movements.  Voice and Breathing - The patient was breathing comfortably without the use of accessory muscles. There was no  wheezing, stridor. The patients voice was clear and strong, and had appropriate pitch and quality.  Ears - The ears were examined under binocular microscopy and with otoscope.  External ear normal. Left canal is patent, right canal with ceruminosis, this was removed with cerumen loop and cupped forceps.  Right tympanic membrane is intact without effusion, retraction or mass. Left tympanic membrane is intact without effusion, retraction or mass.  Eyes - Extraocular movements intact, sclera were not icteric or injected, conjunctiva were pink and moist.  Mouth - Examination of the oral cavity showed pink, healthy oral mucosa. Dentition in good condition. No lesions or ulcerations noted. The tongue was mobile and midline.   Throat - The walls of the oropharynx were smooth, pink, moist, symmetric, and had no lesions or ulcerations.  The tonsillar pillars and soft palate were symmetric. S/P uvulectomy   Neck - Normal midline excursion of the laryngotracheal complex during swallowing.  Full range of motion on passive movement.  Palpation of the occipital, submental, submandibular, internal jugular chain, and supraclavicular nodes did not demonstrate any abnormal lymph nodes or masses.  Palpation of the thyroid was soft and smooth, with no nodules or goiter appreciated.  The trachea was mobile and midline.  Nose - External contour is symmetric, no gross deflection or scars.  Nasal mucosa is pink and moist with no abnormal mucus.  The septum and turbinates were evaluated: grossly normal.  No polyps, masses, or purulence noted on examination.         Assessment and Plan:       ICD-10-CM    1. ASNHL (asymmetrical sensorineural hearing loss)  H90.5    2. Tinnitus, bilateral  H93.13 MR Internal Auditory Canal wo&w Contrast   3. Ceruminosis, right  H61.21 MR Internal Auditory Canal wo&w Contrast     Complete MRI IAC  Wear hearing protection when indicated  Once MRI complete, will medically clear for hearing aids    The current  leading theory on tinnitus is that it originates from the central nervous system itself, and is usually associated with hearing loss and injury to cochlear hair cells.   Advancing age can accompany hearing loss and tinnitus.  If a person is younger without hearing loss, loud noise usually is the leading cause of tinnitus.  Delayed damage to hearing is often seen as well.  Ear protection from noise exposure is important to protect your hearing and decrease the risk for tinnitus.  You can also take steps to mask the noise, such as a low volume de-tuned radio, a fan in the background, a sound tevin, or hearing aids if indicated.  Ways to help prevent or decrease the symptoms of tinnitus include a low salt diet, control of hypertension, avoiding stimulants such as caffeine, tobacco or alcohol and proper sleep, exercise and stress management.  Tinnitus can also be worsened by stress, anxiety, depression, and high blood pressure.  I caution you against numerous expensive non-pharmaceutical options that are advertised, and have no proven benefit.      Please follow up as needed for worsening symptoms, changes in symptoms, or signs of infection.  I recommended yearly audiograms, and consideration of a hearing aid evaluation if indicated.  If there is any dizziness or facial weakness, call for a recheck.     Lori FUNES  [facility]  10:40 AM  September 1, 2020

## 2020-09-01 NOTE — NURSING NOTE
Chief Complaint   Patient presents with     Hearing Problem     medical clearance       Initial /80   Pulse 53   Temp 96.7  F (35.9  C) (Tympanic)   Wt 106.1 kg (234 lb)   SpO2 98%   BMI 31.74 kg/m   Estimated body mass index is 31.74 kg/m  as calculated from the following:    Height as of 11/13/19: 1.829 m (6').    Weight as of this encounter: 106.1 kg (234 lb).  Medication Reconciliation: complete  Prachi Ho LPN

## 2020-09-01 NOTE — PATIENT INSTRUCTIONS
Complete MRI IAC  Wear hearing protection when indicated  Once MRI complete, will medically clear for hearing aids    The current leading theory on tinnitus is that it originates from the central nervous system itself, and is usually associated with hearing loss and injury to cochlear hair cells.   Advancing age can accompany hearing loss and tinnitus.  If a person is younger without hearing loss, loud noise usually is the leading cause of tinnitus.  Delayed damage to hearing is often seen as well.  Ear protection from noise exposure is important to protect your hearing and decrease the risk for tinnitus.  You can also take steps to mask the noise, such as a low volume de-tuned radio, a fan in the background, a sound tevin, or hearing aids if indicated.  Ways to help prevent or decrease the symptoms of tinnitus include a low salt diet, control of hypertension, avoiding stimulants such as caffeine, tobacco or alcohol and proper sleep, exercise and stress management.  Tinnitus can also be worsened by stress, anxiety, depression, and high blood pressure.  I caution you against numerous expensive non-pharmaceutical options that are advertised, and have no proven benefit.      Please follow up as needed for worsening symptoms, changes in symptoms, or signs of infection.  I recommended yearly audiograms, and consideration of a hearing aid evaluation if indicated.  If there is any dizziness or facial weakness, call for a recheck.     I have provided you with a tinnitus education material      Thank you for allowing Lori FUNES and our ENT team to participate in your care.  If your medications are too expensive, please call my nurse at the number listed below.  We can possibly change this medication.    If you have a scheduling or an appointment question please contact our Health Unit Coordinator at their direct line [phone].   ALL nursing questions or concerns can be directed to your ENT nurses at:  [phone] or [phone].

## 2020-09-09 ENCOUNTER — HOSPITAL ENCOUNTER (OUTPATIENT)
Dept: MRI IMAGING | Facility: HOSPITAL | Age: 65
Discharge: HOME | End: 2020-09-09
Attending: NURSE PRACTITIONER | Admitting: NURSE PRACTITIONER
Payer: COMMERCIAL

## 2020-09-09 DIAGNOSIS — H93.13 TINNITUS, BILATERAL: ICD-10-CM

## 2020-09-09 DIAGNOSIS — H61.21 CERUMINOSIS, RIGHT: ICD-10-CM

## 2020-09-09 PROCEDURE — 25500064 ZZH RX 255 OP 636: Performed by: RADIOLOGY

## 2020-09-09 PROCEDURE — 70543 MRI ORBT/FAC/NCK W/O &W/DYE: CPT | Mod: TC

## 2020-09-09 PROCEDURE — A9585 GADOBUTROL INJECTION: HCPCS | Performed by: RADIOLOGY

## 2020-09-09 RX ORDER — GADOBUTROL 604.72 MG/ML
10 INJECTION INTRAVENOUS ONCE
Status: COMPLETED | OUTPATIENT
Start: 2020-09-09 | End: 2020-09-09

## 2020-09-09 RX ADMIN — GADOBUTROL 10 ML: 604.72 INJECTION INTRAVENOUS at 13:25

## 2020-09-10 ENCOUNTER — TELEPHONE (OUTPATIENT)
Dept: OTOLARYNGOLOGY | Facility: OTHER | Age: 65
End: 2020-09-10

## 2021-01-15 ENCOUNTER — HEALTH MAINTENANCE LETTER (OUTPATIENT)
Age: 66
End: 2021-01-15

## 2021-03-18 ENCOUNTER — IMMUNIZATION (OUTPATIENT)
Dept: FAMILY MEDICINE | Facility: OTHER | Age: 66
End: 2021-03-18
Attending: FAMILY MEDICINE
Payer: COMMERCIAL

## 2021-03-18 PROCEDURE — 0001A PR COVID VAC PFIZER DIL RECON 30 MCG/0.3 ML IM: CPT

## 2021-03-18 PROCEDURE — 91300 PR COVID VAC PFIZER DIL RECON 30 MCG/0.3 ML IM: CPT

## 2021-04-06 ENCOUNTER — IMMUNIZATION (OUTPATIENT)
Dept: FAMILY MEDICINE | Facility: OTHER | Age: 66
End: 2021-04-06
Attending: FAMILY MEDICINE
Payer: COMMERCIAL

## 2021-04-06 PROCEDURE — 91300 PR COVID VAC PFIZER DIL RECON 30 MCG/0.3 ML IM: CPT

## 2021-05-15 ENCOUNTER — HEALTH MAINTENANCE LETTER (OUTPATIENT)
Age: 66
End: 2021-05-15

## 2021-09-04 ENCOUNTER — HEALTH MAINTENANCE LETTER (OUTPATIENT)
Age: 66
End: 2021-09-04

## 2021-11-09 NOTE — PLAN OF CARE
T ranged from 99.8 to 101.2. Last T was 100.9. Pt asymptomatic, nondiaphoretic, no c/o chills. Blankets removed and fan turned on. Pt using IS as directed. Other VSS. Pt c/o pain in L knee: 5 mg PRN leo administered at 1655 and 2101. Relief noted. Writer also assisted pt with repositioning of L leg and refilled cryocuff with fresh ice.     Lungs: clear. GI: Bowel sounds active and audible. Pt reports passing gas. No BM. Scheduled stool softener administered. Regular diet tolerated. Good appetite. : Pt voiding without difficulty. Urinal within reach. Pt up to bathroom several times this shift. SBA x1 with walker. L knee: aquacel intact. When writer went for initial assessment, pt reported not having the cryocuff on for a couple hours (while up in chair) and he said he was rubbing the knee. Aquacel assessed: small, pea-sized shadowing noted. (unchanged during shift). Swelling also noted in L leg (unchanged through shift). At approximately 1615, writer reapplied the cryocuff and elevated extremity. Relief noted.     IV saline-locked. Call light within reach. Pt appropriately using call light to get out up bed.   No

## 2022-06-11 ENCOUNTER — HEALTH MAINTENANCE LETTER (OUTPATIENT)
Age: 67
End: 2022-06-11

## 2022-10-22 ENCOUNTER — HEALTH MAINTENANCE LETTER (OUTPATIENT)
Age: 67
End: 2022-10-22

## 2023-06-18 ENCOUNTER — HEALTH MAINTENANCE LETTER (OUTPATIENT)
Age: 68
End: 2023-06-18

## 2023-11-05 ENCOUNTER — APPOINTMENT (OUTPATIENT)
Dept: CT IMAGING | Facility: HOSPITAL | Age: 68
End: 2023-11-05
Attending: PHYSICIAN ASSISTANT
Payer: COMMERCIAL

## 2023-11-05 ENCOUNTER — HOSPITAL ENCOUNTER (EMERGENCY)
Facility: HOSPITAL | Age: 68
Discharge: HOME | End: 2023-11-05
Attending: PHYSICIAN ASSISTANT | Admitting: PHYSICIAN ASSISTANT
Payer: COMMERCIAL

## 2023-11-05 VITALS
SYSTOLIC BLOOD PRESSURE: 170 MMHG | TEMPERATURE: 97.7 F | OXYGEN SATURATION: 93 % | HEART RATE: 49 BPM | DIASTOLIC BLOOD PRESSURE: 83 MMHG | RESPIRATION RATE: 18 BRPM

## 2023-11-05 DIAGNOSIS — J36 PERITONSILLAR ABSCESS: Primary | ICD-10-CM

## 2023-11-05 LAB
ANION GAP SERPL CALCULATED.3IONS-SCNC: 11 MMOL/L (ref 7–15)
BASOPHILS # BLD AUTO: 0.1 10E3/UL (ref 0–0.2)
BASOPHILS NFR BLD AUTO: 1 %
BUN SERPL-MCNC: 26.2 MG/DL (ref 8–23)
CALCIUM SERPL-MCNC: 9.6 MG/DL (ref 8.8–10.2)
CHLORIDE SERPL-SCNC: 103 MMOL/L (ref 98–107)
CREAT SERPL-MCNC: 1.26 MG/DL (ref 0.67–1.17)
CRP SERPL-MCNC: 27.16 MG/L
DEPRECATED HCO3 PLAS-SCNC: 24 MMOL/L (ref 22–29)
EGFRCR SERPLBLD CKD-EPI 2021: 63 ML/MIN/1.73M2
EOSINOPHIL # BLD AUTO: 0.2 10E3/UL (ref 0–0.7)
EOSINOPHIL NFR BLD AUTO: 2 %
ERYTHROCYTE [DISTWIDTH] IN BLOOD BY AUTOMATED COUNT: 15.2 % (ref 10–15)
GLUCOSE SERPL-MCNC: 102 MG/DL (ref 70–99)
GROUP A STREP BY PCR: NOT DETECTED
HCT VFR BLD AUTO: 39.4 % (ref 40–53)
HGB BLD-MCNC: 13.2 G/DL (ref 13.3–17.7)
HOLD SPECIMEN: NORMAL
IMM GRANULOCYTES # BLD: 0.1 10E3/UL
IMM GRANULOCYTES NFR BLD: 1 %
LYMPHOCYTES # BLD AUTO: 0.9 10E3/UL (ref 0.8–5.3)
LYMPHOCYTES NFR BLD AUTO: 9 %
MCH RBC QN AUTO: 30.4 PG (ref 26.5–33)
MCHC RBC AUTO-ENTMCNC: 33.5 G/DL (ref 31.5–36.5)
MCV RBC AUTO: 91 FL (ref 78–100)
MONOCYTES # BLD AUTO: 0.8 10E3/UL (ref 0–1.3)
MONOCYTES NFR BLD AUTO: 9 %
NEUTROPHILS # BLD AUTO: 7.5 10E3/UL (ref 1.6–8.3)
NEUTROPHILS NFR BLD AUTO: 78 %
NRBC # BLD AUTO: 0 10E3/UL
NRBC BLD AUTO-RTO: 0 /100
PLATELET # BLD AUTO: 279 10E3/UL (ref 150–450)
POTASSIUM SERPL-SCNC: 5.1 MMOL/L (ref 3.4–5.3)
RBC # BLD AUTO: 4.34 10E6/UL (ref 4.4–5.9)
SODIUM SERPL-SCNC: 138 MMOL/L (ref 135–145)
WBC # BLD AUTO: 9.4 10E3/UL (ref 4–11)

## 2023-11-05 PROCEDURE — 70491 CT SOFT TISSUE NECK W/DYE: CPT

## 2023-11-05 PROCEDURE — 36415 COLL VENOUS BLD VENIPUNCTURE: CPT | Performed by: PHYSICIAN ASSISTANT

## 2023-11-05 PROCEDURE — 96365 THER/PROPH/DIAG IV INF INIT: CPT | Mod: XU

## 2023-11-05 PROCEDURE — 82310 ASSAY OF CALCIUM: CPT | Performed by: PHYSICIAN ASSISTANT

## 2023-11-05 PROCEDURE — 250N000011 HC RX IP 250 OP 636: Mod: JZ | Performed by: PHYSICIAN ASSISTANT

## 2023-11-05 PROCEDURE — 250N000009 HC RX 250: Performed by: PHYSICIAN ASSISTANT

## 2023-11-05 PROCEDURE — 99284 EMERGENCY DEPT VISIT MOD MDM: CPT | Mod: FS | Performed by: STUDENT IN AN ORGANIZED HEALTH CARE EDUCATION/TRAINING PROGRAM

## 2023-11-05 PROCEDURE — 85014 HEMATOCRIT: CPT | Performed by: PHYSICIAN ASSISTANT

## 2023-11-05 PROCEDURE — 96375 TX/PRO/DX INJ NEW DRUG ADDON: CPT | Mod: XU

## 2023-11-05 PROCEDURE — 250N000011 HC RX IP 250 OP 636: Performed by: PHYSICIAN ASSISTANT

## 2023-11-05 PROCEDURE — 86140 C-REACTIVE PROTEIN: CPT | Performed by: PHYSICIAN ASSISTANT

## 2023-11-05 PROCEDURE — 99285 EMERGENCY DEPT VISIT HI MDM: CPT | Mod: 25

## 2023-11-05 PROCEDURE — 87651 STREP A DNA AMP PROBE: CPT | Performed by: PHYSICIAN ASSISTANT

## 2023-11-05 RX ORDER — KETOROLAC TROMETHAMINE 15 MG/ML
15 INJECTION, SOLUTION INTRAMUSCULAR; INTRAVENOUS ONCE
Status: COMPLETED | OUTPATIENT
Start: 2023-11-05 | End: 2023-11-05

## 2023-11-05 RX ORDER — AMPICILLIN AND SULBACTAM 2; 1 G/1; G/1
3 INJECTION, POWDER, FOR SOLUTION INTRAMUSCULAR; INTRAVENOUS ONCE
Status: COMPLETED | OUTPATIENT
Start: 2023-11-05 | End: 2023-11-05

## 2023-11-05 RX ORDER — IOPAMIDOL 755 MG/ML
100 INJECTION, SOLUTION INTRAVASCULAR ONCE
Status: COMPLETED | OUTPATIENT
Start: 2023-11-05 | End: 2023-11-05

## 2023-11-05 RX ORDER — DEXAMETHASONE SODIUM PHOSPHATE 10 MG/ML
15 INJECTION, SOLUTION INTRAMUSCULAR; INTRAVENOUS ONCE
Status: DISCONTINUED | OUTPATIENT
Start: 2023-11-05 | End: 2023-11-05

## 2023-11-05 RX ORDER — DEXAMETHASONE SODIUM PHOSPHATE 10 MG/ML
15 INJECTION, SOLUTION INTRAMUSCULAR; INTRAVENOUS ONCE
Status: COMPLETED | OUTPATIENT
Start: 2023-11-05 | End: 2023-11-05

## 2023-11-05 RX ORDER — METHYLPREDNISOLONE SODIUM SUCCINATE 125 MG/2ML
60 INJECTION, POWDER, LYOPHILIZED, FOR SOLUTION INTRAMUSCULAR; INTRAVENOUS ONCE
Status: DISCONTINUED | OUTPATIENT
Start: 2023-11-05 | End: 2023-11-05

## 2023-11-05 RX ADMIN — AMPICILLIN SODIUM AND SULBACTAM SODIUM 3 G: 2; 1 INJECTION, POWDER, FOR SOLUTION INTRAMUSCULAR; INTRAVENOUS at 18:35

## 2023-11-05 RX ADMIN — KETOROLAC TROMETHAMINE 15 MG: 15 INJECTION, SOLUTION INTRAMUSCULAR; INTRAVENOUS at 17:53

## 2023-11-05 RX ADMIN — DEXAMETHASONE SODIUM PHOSPHATE 15 MG: 10 INJECTION, SOLUTION INTRAMUSCULAR; INTRAVENOUS at 17:55

## 2023-11-05 RX ADMIN — IOPAMIDOL 100 ML: 755 INJECTION, SOLUTION INTRAVENOUS at 18:00

## 2023-11-05 RX ADMIN — TOPICAL ANESTHETIC 0.5 ML: 200 SPRAY DENTAL; PERIODONTAL at 18:35

## 2023-11-05 ASSESSMENT — ACTIVITIES OF DAILY LIVING (ADL)
ADLS_ACUITY_SCORE: 35
ADLS_ACUITY_SCORE: 35

## 2023-11-05 ASSESSMENT — ENCOUNTER SYMPTOMS
SORE THROAT: 1
VOICE CHANGE: 1
APPETITE CHANGE: 0
FATIGUE: 0
SHORTNESS OF BREATH: 0
FEVER: 0

## 2023-11-05 NOTE — ED TRIAGE NOTES
"C/o sore throat for about a week. Rates 8/10. Reports he is here today because now he is barely able to swallow. Significant other with who is a retired RN and states she looked in his throat and she saw a \"peritonsillar abscess that is almost blocking his airway.\" Patient denies any SOB or difficulty breathing. Respirations even and non-labored in triage. Smiling and answering questions appropriately. No noted stridor, voice change, or hoarseness while answering questions in triage.      Triage Assessment (Adult)       Row Name 11/05/23 1524          Triage Assessment    Airway WDL X;airway symptoms     Airway Symptoms other (see comments)  significant other reports she saw a peritonsilar abcsess that is almost blocking the airway        Respiratory WDL    Respiratory WDL WDL        Skin Circulation/Temperature WDL    Skin Circulation/Temperature WDL WDL                     "

## 2023-11-05 NOTE — ED PROVIDER NOTES
"  History     Chief Complaint   Patient presents with    Pharyngitis     The history is provided by the patient and the spouse.     Ramy Guillermo is a 68 year old male who presented to the emergency department ambulatory along with wife for evaluation of left-sided throat discomfort.  Patient reports that he has been having intermittent left-sided throat pain for approximate the last 2 months that wax and wane in nature.  Patient has been having worsening discomfort over the last 2 weeks.  Wife looked in his throat and noted significant swelling on that side.  She does endorse that he has had \"a little bit of a change in his voice.\"  No fevers or chills.  No vomiting.  No stridor.     Allergies:  Allergies   Allergen Reactions    No Known Drug Allergy        Problem List:    Patient Active Problem List    Diagnosis Date Noted    S/P shoulder replacement, left 11/13/2019     Priority: Medium    Left knee pain 04/11/2019     Priority: Medium    Primary osteoarthritis of left knee 09/04/2018     Priority: Medium    History of total right knee replacement 09/04/2018     Priority: Medium    Primary osteoarthritis of right shoulder 05/30/2017     Priority: Medium    History of total knee arthroplasty, right 01/05/2017     Priority: Medium    History of total hip replacement 03/21/2016     Priority: Medium    H/O arthroscopic knee surgery 03/21/2016     Priority: Medium    S/P rotator cuff repair 03/21/2016     Priority: Medium    Family history of colon cancer 06/05/2015     Priority: Medium     Father age 80.       Rotator cuff dysfunction 06/03/2014     Priority: Medium    Shoulder impingement 02/04/2014     Priority: Medium    Dysphagia 12/27/2013     Priority: Medium    Velopharyngeal insufficiency, acquired 12/27/2013     Priority: Medium    Dysphonia 12/27/2013     Priority: Medium    LPRD (laryngopharyngeal reflux disease) 12/27/2013     Priority: Medium    Complete rupture of rotator cuff 12/18/2013     Priority: " Medium    Overweight (BMI 25.0-29.9) 12/13/2013     Priority: Medium    Biceps tendonitis 11/18/2013     Priority: Medium    Osteoarthritis of shoulder 11/13/2013     Priority: Medium    S/P arthroscopy of right knee 03/12/2013     Priority: Medium    Chondromalacia, knee - right 02/12/2013     Priority: Medium    Lateral meniscus tear - right 02/12/2013     Priority: Medium    Medial meniscus tear - right 02/12/2013     Priority: Medium    Erectile dysfunction 09/28/2012     Priority: Medium    Varicose veins of lower extremities with complications 08/20/2012     Priority: Medium     Problem list name updated by automated process. Provider to review      History of adenomatous polyp of colon 08/03/2012     Priority: Medium     colonoscopy due 2017      Advanced directives, counseling/discussion 07/22/2011     Priority: Medium     Discussed advance care planning with patient; information given to patient to review. 7/22/2011        Hypertension goal BP (blood pressure) < 140/90 12/22/2010     Priority: Medium    CARDIOVASCULAR SCREENING; LDL GOAL LESS THAN 130 10/31/2010     Priority: Medium     Hamer Risk Score: 12% (13 Total Points)      Family history of early CAD 12/11/2009     Priority: Medium     brother      Overactive bladder 12/11/2009     Priority: Medium    Essential hypertension, benign      Priority: Medium    GERD (gastroesophageal reflux disease)      Priority: Medium    Seasonal allergic rhinitis      Priority: Medium    Lumbar spondylosis      Priority: Medium     L4-L5 & L5-S1  Do you wish to do the replacement in the background? yes            Past Medical History:    Past Medical History:   Diagnosis Date    Allergic rhinitis     Certain adverse effects, not elsewhere classified, other     Colon adenoma 8/3/2012    DJD (degenerative joint disease) of lumbar spine 2003    Duodenal ulcer, unspecified as acute or chronic, without hemorrhage, perforation, or obstruction     Essential  hypertension, benign 2006    GERD (gastroesophageal reflux disease)     PONV (postoperative nausea and vomiting)        Past Surgical History:    Past Surgical History:   Procedure Laterality Date    ABLATE VEIN VARICOSE RADIO FREQUENCY WITH PHLEBECTOMY MULTIPLE STAB  10/7/11, 11/4/11    RT, LT Dr. Cruz Robert    ABLATE VEIN VARICOSE RADIO FREQUENCY WITH PHLEBECTOMY MULTIPLE STAB  10/7/2011    Procedure:ABLATE VEIN VARICOSE RADIO FREQUENCY WITH PHLEBECTOMY MULTIPLE STAB; Right Radio Frenquency with Greater Saphenous with  Phlebectomy Multiple Stab; Surgeon:CRUZ ROBERT; Location:[facility]    ABLATE VEIN VARICOSE RADIO FREQUENCY WITH PHLEBECTOMY MULTIPLE STAB  11/4/2011    Procedure:ABLATE VEIN VARICOSE RADIO FREQUENCY WITH PHLEBECTOMY MULTIPLE STAB; Left Radio Frenquency with Greater Saphenous Vein with  Bilateral Phlebectomy Multiple Stab Varicose Veins.; Surgeon:CRUZ ROBERT; Location:[facility]    ABLATE VEIN VARICOSE RADIO FREQUENCY WITH PHLEBECTOMY MULTIPLE STAB  11/28/2012    Procedure: ABLATE VEIN VARICOSE RADIO FREQUENCY WITH PHLEBECTOMY MULTIPLE STAB;   Bilateral stab Micro- phlebectomies;  Surgeon: Dany Mascorro MD;  Location: [facility]    ARTHROPLASTY KNEE Left 4/11/2019    Procedure: LEFT TOTAL KNEE ARTHROPLASTY/PERSONA;  Surgeon: Lamonte Cunha MD;  Location: [facility]    ARTHROPLASTY SHOULDER Left 11/13/2019    Procedure: LEFT TOTAL SHOULDER ARTHROPLASTY;  Surgeon: Alexandre Guillory MD;  Location: [facility]    ARTHROSCOPY KNEE  3/1/2013    Procedure: ARTHROSCOPY KNEE;  Right knee arthoscopy w/ medial menisectomy and chondroplasty;  Surgeon: Abiodun Jacobson MD;  Location: [facility]    COLONOSCOPY N/A 8/30/2016    Procedure: COLONOSCOPY;  Surgeon: Calos Post MD;  Location: [facility]    COLONOSCOPY N/A 12/8/2017    Procedure: COLONOSCOPY;  COLONOSCOPY;  Surgeon: Calos Post MD;  Location: [facility]    FLEXIBLE SIGMOIDOSCOPY  1/9/07     EXCISION OF UVULA  2001    for snoring     KNEE SCOPE,MED/LAT  MENISECTOMY  05    RT + chondroplasty of medial femoral condyl & patellofemoral joint, Dr. Verma     PARTIAL REMOVAL/REPAIR,ACROMION  13    Left, [facility]in     REPAIR BICEPS LONG TENDON  13    Left    HERNIA REPAIR, INGUINAL RT/LT  01/10/08    LT w/onlay mesh patch, Dr. Mchugh    JOINT REPLACEMENT, HIP RT/LT      RT hip RESURFACING arthroplasty, Dr. Ra Valdivia @[facility] EXCIS BOWEL LESION(S),SINGLE ENTEROTOMY  1968    small intestine    [facility] RECONSTR TOTAL SHOULDER IMPLANT Right 10/24/2017    [facility] COLONOSCOPY W BIOPSY  8/3/12    proximal ascending, tubular adenoma       Family History:    Family History   Problem Relation Age of Onset    Hypertension Father     Gastrointestinal Disease Father         PUD    Colon Cancer Father 80        malignant rectal polyp    Myocardial Infarction Brother 48            Myocardial Infarction Mother     Thrombophilia Mother 75         of PE    Prostate Cancer Maternal Grandfather     Prostate Cancer Maternal Uncle     Prostate Cancer Paternal Uncle     Diabetes No family hx of     Anesthesia Reaction No family hx of     Blood Disease No family hx of        Social History:  Marital Status:   [2]  Social History     Tobacco Use    Smoking status: Former     Types: Cigarettes     Quit date: 2003     Years since quittin.8    Smokeless tobacco: Never   Substance Use Topics    Alcohol use: Yes     Comment: 3-4 /week    Drug use: No        Medications:    amoxicillin-clavulanate (AUGMENTIN) 875-125 MG tablet  apixaban ANTICOAGULANT (ELIQUIS) 5 MG tablet  ferrous sulfate (FEROSUL) 325 (65 Fe) MG tablet  flecainide (TAMBOCOR) 50 MG tablet  ibuprofen (ADVIL/MOTRIN) 200 MG tablet  lisinopril (PRINIVIL,ZESTRIL) 20 MG tablet  medical cannabis (Patient's own supply)  Multiple Vitamins-Minerals (MULTIVITAMIN ADULTS 50+ PO)  naproxen sodium (ALEVE) 220 MG tablet  Omega-3 Fatty Acids (FISH OIL) 600 MG CAPS  omeprazole (PRILOSEC) 40 MG  capsule  oxybutynin (DITROPAN) 5 MG tablet  pramipexole (MIRAPEX) 0.5 MG tablet  PROAIR  (90 Base) MCG/ACT inhaler  sildenafil (VIAGRA) 100 MG tablet  vitamin D3 (CHOLECALCIFEROL) 10 MCG (400 UNIT) capsule  zolpidem (AMBIEN) 10 MG tablet          Review of Systems   Constitutional:  Negative for appetite change, fatigue and fever.   HENT:  Positive for sore throat and voice change.    Respiratory:  Negative for shortness of breath.    Cardiovascular:  Negative for chest pain.       Physical Exam   BP: 179/89  Pulse: 54  Temp: 97.7  F (36.5  C)  Resp: 18  SpO2: 95 %      Physical Exam  Vitals and nursing note reviewed.   Constitutional:       General: He is not in acute distress.     Appearance: Normal appearance. He is normal weight. He is not ill-appearing, toxic-appearing or diaphoretic.      Comments: Pleasant and talkative 68-year-old male found ambulatory in the exam room in no distress   HENT:      Mouth/Throat:      Comments: Examination reveals some mild erythema to the posterior pharynx with moderate swelling to the left peritonsillar area.  There is no appreciable exudate.  Oral mucosa pink and moist without lesions or petechiae.  No significant trismus.  Normal speech.  Cardiovascular:      Rate and Rhythm: Regular rhythm. Bradycardia present.   Pulmonary:      Effort: Pulmonary effort is normal.   Musculoskeletal:      Cervical back: Normal range of motion and neck supple. No rigidity or tenderness.   Lymphadenopathy:      Cervical: No cervical adenopathy.   Skin:     General: Skin is warm and dry.      Capillary Refill: Capillary refill takes less than 2 seconds.   Neurological:      General: No focal deficit present.      Mental Status: He is alert and oriented to person, place, and time.         ED Course              ED Course as of 11/05/23 2009   [place] Nov 05, 2023   1911 Emergency Department Attending Supervision Note    I have personally seen and examined the patient.  Case reviewed and  discussed with Aaron Daly PA-C.  I have reviewed and agree with the PMH, FH, SOC, ROS.  Please see today's note by ERIKA.  ERIKA care under my supervision.    Key Exam:  Midline shift with tonsillar hypertrophy.  Exudate noted in the inferior pole of the tonsil.    Assessment:  Peritonsillar abscess   Diagnosed via CT    Key Medical Decision Making/Plan:  Peritonsillar abscess performed.    See procedure note for further details.  We will continue to monitor in the emergency for any concerns regarding airway management.  Advised the patient to hold his DOAC tonight, and continue it tomorrow morning.  ENT follow-up for concerns regarding underlying malignancy as outpatient.    Alexander Kincaid MD on 11/5/2023 at 7:11 PM     Procedures              Critical Care time:  none               Results for orders placed or performed during the hospital encounter of 11/05/23 (from the past 24 hour(s))   Extra Tube    Narrative    The following orders were created for panel order Extra Tube.  Procedure                               Abnormality         Status                     ---------                               -----------         ------                     Extra Blue Top Tube[[id number]]                              Final result               Extra Red Top Tube[[id number]]                               Final result               Extra Heparinized Syringe[[id number]]                        Final result                 Please view results for these tests on the individual orders.   Extra Blue Top Tube   Result Value Ref Range    Hold Specimen JIC    Extra Red Top Tube   Result Value Ref Range    Hold Specimen JIC    Extra Heparinized Syringe   Result Value Ref Range    Hold Specimen JIC    CBC with platelets differential    Narrative    The following orders were created for panel order CBC with platelets differential.  Procedure                               Abnormality         Status                     ---------                                -----------         ------                     CBC with platelets and d...[[id number]]  Abnormal            Final result                 Please view results for these tests on the individual orders.   Basic metabolic panel   Result Value Ref Range    Sodium 138 135 - 145 mmol/L    Potassium 5.1 3.4 - 5.3 mmol/L    Chloride 103 98 - 107 mmol/L    Carbon Dioxide (CO2) 24 22 - 29 mmol/L    Anion Gap 11 7 - 15 mmol/L    Urea Nitrogen 26.2 (H) 8.0 - 23.0 mg/dL    Creatinine 1.26 (H) 0.67 - 1.17 mg/dL    GFR Estimate 63 >60 mL/min/1.73m2    Calcium 9.6 8.8 - 10.2 mg/dL    Glucose 102 (H) 70 - 99 mg/dL   CRP inflammation   Result Value Ref Range    CRP Inflammation 27.16 (H) <5.00 mg/L   CBC with platelets and differential   Result Value Ref Range    WBC Count 9.4 4.0 - 11.0 10e3/uL    RBC Count 4.34 (L) 4.40 - 5.90 10e6/uL    Hemoglobin 13.2 (L) 13.3 - 17.7 g/dL    Hematocrit 39.4 (L) 40.0 - 53.0 %    MCV 91 78 - 100 fL    MCH 30.4 26.5 - 33.0 pg    MCHC 33.5 31.5 - 36.5 g/dL    RDW 15.2 (H) 10.0 - 15.0 %    Platelet Count 279 150 - 450 10e3/uL    % Neutrophils 78 %    % Lymphocytes 9 %    % Monocytes 9 %    % Eosinophils 2 %    % Basophils 1 %    % Immature Granulocytes 1 %    NRBCs per 100 WBC 0 <1 /100    Absolute Neutrophils 7.5 1.6 - 8.3 10e3/uL    Absolute Lymphocytes 0.9 0.8 - 5.3 10e3/uL    Absolute Monocytes 0.8 0.0 - 1.3 10e3/uL    Absolute Eosinophils 0.2 0.0 - 0.7 10e3/uL    Absolute Basophils 0.1 0.0 - 0.2 10e3/uL    Absolute Immature Granulocytes 0.1 <=0.4 10e3/uL    Absolute NRBCs 0.0 10e3/uL   Group A Streptococcus PCR Throat Swab    Specimen: Throat; Swab   Result Value Ref Range    Group A strep by PCR Not Detected Not Detected    Narrative    The Xpert Xpress Strep A test, performed on the [employer]  Instrument Systems, is a rapid, qualitative in vitro diagnostic test for the detection of Streptococcus pyogenes (Group A ß-hemolytic Streptococcus, Strep A) in throat swab specimens from  patients with signs and symptoms of pharyngitis. The Xpert Xpress Strep A test can be used as an aid in the diagnosis of Group A Streptococcal pharyngitis. The assay is not intended to monitor treatment for Group A Streptococcus infections. The Xpert Xpress Strep A test utilizes an automated real-time polymerase chain reaction (PCR) to detect Streptococcus pyogenes DNA.   Soft tissue neck CT w contrast    Narrative    PROCEDURE: CT SOFT TISSUE NECK W CONTRAST 11/5/2023 6:19 PM    HISTORY: Left-sided pharyngitis with left-sided peritonsillar swelling    COMPARISONS: None.    Meds/Dose Given:    TECHNIQUE: CT scan of the neck with contrast sagittal coronal  reconstruction    FINDINGS: The muscles of mastication and parapharyngeal spaces are  normal. The salivary glands are normal.    There is a left tonsillar abscess measuring 1.7 cm in diameter.    The larynx and upper trachea is normal. Thyroid gland is difficult to  visualize due to streak artifacts from the shoulders. The cervical  lymph nodes are normal in caliber The lung apices are clear.         Impression    IMPRESSION: Left tonsillar abscess measuring 1.7 cm in diameter.    CARTER CURRY MD         SYSTEM ID:  [id number]   Peritonsillar abscess aspiration    Narrative    Alexander Kincaid MD     11/5/2023  7:11 PM  [facility]    Procedure: Peritonsillar abscess aspiration    Date/Time: 11/5/2023 7:09 PM    Performed by: Alexander Kincaid MD  Authorized by: Alexander Kincaid MD    Risks, benefits and alternatives discussed.       ANESTHESIA    Anesthesia:  See MAR for details  Local Anesthetic:  Topical anesthetic (Benzocaine spray)      PROCEDURE  Describe Procedure: Patient was prepped in a side position.    Suction was at the bedside.  18-gauge lumbar spinal needle was prepared.  The sheath was cut off the tip to allow about 2 cm of needle space.  It was attached to a 3 cc syringe.  A medial approach towards the tonsil was taken, and aspiration  was   performed.  There is minimal amounts of pus with 2 separate aspirations.  A sagittal plane was attempted with aspiration.  The patient tolerated this well.  There was some bleeding that was managed with some suction.    The patient was provided with ice water after the procedure.    Airway was intact without obstruction the entirety of the procedure.    Patient Tolerance:  Patient tolerated the procedure well with no immediate   complications       Medications   ketorolac (TORADOL) injection 15 mg (15 mg Intravenous $Given 11/5/23 1753)   iopamidol (ISOVUE-370) solution 100 mL (100 mLs Intravenous $Given 11/5/23 1800)   sodium chloride (PF) 0.9% PF flush 50 mL (50 mLs Intravenous $Given 11/5/23 1800)   dexAMETHasone PF (DECADRON) injection 15 mg (15 mg Intravenous $Given 11/5/23 1755)   ampicillin-sulbactam (UNASYN) 3 g vial to attach to  mL bag (0 g Intravenous Stopped 11/5/23 1915)   benzocaine 20% (HURRICAINE/TOPEX) 20 % spray 0.5-1 mL (0.5 mLs Mouth/Throat $Given 11/5/23 1835)       Assessments & Plan (with Medical Decision Making)   68-year-old male with left peritonsillar abscess.  Attempted treatment with needle aspiration without significant return of purulent material.  Given the patient's history of Eliquis use, incision and drainage like would have far more risk than benefits.  He otherwise looks well.  He is afebrile.  He has no significant leukocytosis.  Treated with IV Decadron and Unasyn in the emergency department.  Plan will be for outpatient Augmentin and urgent ENT follow-up.  Long detailed discussion with the patient and his wife.  They voiced complete understanding were quite happy and agreeable.  The patient has no significant trismus.    This document was prepared using a combination of typing and voice generated software.  While every attempt was made for accuracy, spelling and grammatical errors may exist.     I have reviewed the nursing notes.    I have reviewed the findings,  diagnosis, plan and need for follow up with the patient.           Medical Decision Making  The patient's presentation was of moderate complexity (an undiagnosed new problem with uncertain diagnosis).    The patient's evaluation involved:  ordering and/or review of 3+ test(s) in this encounter (multiple labs and imaging)    The patient's management necessitated moderate risk (prescription drug management including medications given in the ED) and moderate risk (a decision regarding minor procedure (incision & drainage) with risk factors of cardiac disease).        New Prescriptions    AMOXICILLIN-CLAVULANATE (AUGMENTIN) 875-125 MG TABLET    Take 1 tablet by mouth 2 times daily for 10 days       Final diagnoses:   Peritonsillar abscess       11/5/2023   [facility] EM[facility]       Aaron Daly PA-C  11/05/23 2009

## 2023-11-06 NOTE — PROCEDURES
Procedures:  [facility]    Procedure: Peritonsillar abscess aspiration    Date/Time: 11/5/2023 7:09 PM    Performed by: Alexander Kincaid MD  Authorized by: Alexander Kincaid MD    Risks, benefits and alternatives discussed.       ANESTHESIA    Anesthesia:  See MAR for details  Local Anesthetic:  Topical anesthetic (Benzocaine spray)      PROCEDURE  Describe Procedure: Patient was prepped in a side position.    Suction was at the bedside.  18-gauge lumbar spinal needle was prepared.  The sheath was cut off the tip to allow about 2 cm of needle space.  It was attached to a 3 cc syringe.  A medial approach towards the tonsil was taken, and aspiration was performed.  There is minimal amounts of pus with 2 separate aspirations.  A sagittal plane was attempted with aspiration.  The patient tolerated this well.  There was some bleeding that was managed with some suction.    The patient was provided with ice water after the procedure.    Airway was intact without obstruction the entirety of the procedure.    Patient Tolerance:  Patient tolerated the procedure well with no immediate complications

## 2023-11-06 NOTE — ED NOTES
Face to face report given with opportunity to observe patient.    Report given to LAURENT Durbin RN   11/5/2023  7:02 PM

## 2023-11-06 NOTE — DISCHARGE INSTRUCTIONS
Continue Augmentin tomorrow morning twice a day.    Hold your Eliquis tonight.     Please follow-up with ear nose and throat in the next few days.  A referral has been placed.  They will call to schedule.    Please return to this emergency department for any worsening of your condition or other concerns whatsoever.

## 2023-11-07 ENCOUNTER — OFFICE VISIT (OUTPATIENT)
Dept: OTOLARYNGOLOGY | Facility: OTHER | Age: 68
End: 2023-11-07
Attending: NURSE PRACTITIONER
Payer: COMMERCIAL

## 2023-11-07 VITALS
DIASTOLIC BLOOD PRESSURE: 62 MMHG | OXYGEN SATURATION: 97 % | WEIGHT: 227 LBS | TEMPERATURE: 97.1 F | SYSTOLIC BLOOD PRESSURE: 132 MMHG | HEIGHT: 72 IN | BODY MASS INDEX: 30.75 KG/M2 | HEART RATE: 50 BPM

## 2023-11-07 DIAGNOSIS — J36 PERITONSILLAR ABSCESS: ICD-10-CM

## 2023-11-07 PROCEDURE — 31575 DIAGNOSTIC LARYNGOSCOPY: CPT | Performed by: NURSE PRACTITIONER

## 2023-11-07 PROCEDURE — 99213 OFFICE O/P EST LOW 20 MIN: CPT | Mod: 25 | Performed by: NURSE PRACTITIONER

## 2023-11-07 PROCEDURE — G0463 HOSPITAL OUTPT CLINIC VISIT: HCPCS | Mod: 25

## 2023-11-07 RX ORDER — DEXAMETHASONE 4 MG/1
TABLET ORAL
Qty: 7.5 TABLET | Refills: 0 | Status: SHIPPED | OUTPATIENT
Start: 2023-11-07

## 2023-11-07 ASSESSMENT — PAIN SCALES - GENERAL: PAINLEVEL: NO PAIN (0)

## 2023-11-07 NOTE — PATIENT INSTRUCTIONS
Thank you for allowing Lori Back and our ENT team to participate in your care.  If your medications are too expensive, please give the nurse a call.  We can possibly change this medication.  If you have a scheduling or an appointment question please contact our Health Unit Coordinator at their direct line [phone].   ALL nursing questions or concerns can be directed to your ENT nurse at: [phone] - [place]yi       dexAMETHasone (DECADRON) 4 MG tablet,  Take 12 mg x 1 day, 10 mg x 1 day, 8 mg x 1 day     Continue Augmentin take as prescribed     Follow up in 3-4 weeks with Lori Back    Follow up sooner if you experience changes or worsening symptoms

## 2023-11-07 NOTE — PROGRESS NOTES
Otolaryngology Note         Chief Complaint:     Patient presents with:  Ent Problem: Peritonsillar abcess           History of Present Illness:     Ramy Guillermo is a 68 year old male seen today for concerns for left-sided peritonsillar abscess.      He reports he has had a sore throat that has been ebbing and flowing for the past 2 months.  On 11/5/2023 the symptoms worsen significantly.  Pain had increased to approximately 8 out of 10, he had difficulty swallowing secretions and felt that his airway was starting to close off.  He presented to the emergency room and CT soft tissue neck was completed showing left peritonsillar abscess measuring 1.7 cm in diameter.  He was treated with Decadron, methylprednisone and Unasyn IV in the emergency department and oral Augmentin on discharge.  A needle aspiration was attempted without significant return of purulent material.  No incision and drainage was attempted due to Eliquis.    Today he reports he is doing significantly better.  He is tolerating Augmentin well.  He is not on any Decadron at home.  He is able to swallow food and fluids. He has had improvement in voice, pain and swallowing.  Pain is rated at 1/10 now.  He is taking tylenol and naproxen or ibuprofen with good relief.    Quit smoking in 2002.  No history of heavy smoking.  No history of heavy etoh.  No personal history of cancer.    History of uvulectomy in the distant past.         Medications:     Current Outpatient Rx   Medication Sig Dispense Refill    amoxicillin-clavulanate (AUGMENTIN) 875-125 MG tablet Take 1 tablet by mouth 2 times daily for 10 days 20 tablet 0    apixaban ANTICOAGULANT (ELIQUIS) 5 MG tablet Take 5 mg by mouth 2 times daily      dexAMETHasone (DECADRON) 4 MG tablet Take 12 mg x 1 day, 10 mg x 1 day, 8 mg x 1 day 7.5 tablet 0    ferrous sulfate (FEROSUL) 325 (65 Fe) MG tablet Take 1 tablet (325 mg) by mouth daily (with breakfast)      ibuprofen (ADVIL/MOTRIN) 200 MG tablet Take 200  mg by mouth every 6 hours as needed for moderate pain       lisinopril (PRINIVIL,ZESTRIL) 20 MG tablet Take 1 tablet (20 mg) by mouth daily for blood pressure. 90 tablet 1    medical cannabis (Patient's own supply) 1 Dose See Admin Instructions (The purpose of this order is to document that the patient reports taking medical cannabis.  This is not a prescription, and is not used to certify that the patient has a qualifying medical condition.) Uses a topical, oral liquid and capsules from [employer]. Uses topical twice daily on his knee, and oral liquid at bedtime for pain management. Used capsules awhile back but doesn't have any more.      Multiple Vitamins-Minerals (MULTIVITAMIN ADULTS 50+ PO) Take 1 tablet by mouth daily      naproxen sodium (ALEVE) 220 MG tablet Take 220 mg by mouth 2 times daily as needed for moderate pain       Omega-3 Fatty Acids (FISH OIL) 600 MG CAPS Take 600 mg by mouth daily      omeprazole (PRILOSEC) 40 MG capsule Take 1 capsule (40 mg) by  mouth daily before a meal  for reflux. 90 capsule 1    oxybutynin (DITROPAN) 5 MG tablet Take 10 mg by mouth daily      pramipexole (MIRAPEX) 0.5 MG tablet Take 0.5 mg by mouth At Bedtime       PROAIR  (90 Base) MCG/ACT inhaler Inhale 1 puff into the lungs every 4 hours as needed  0    sildenafil (VIAGRA) 100 MG tablet Take 0.5-1 tablets ( mg) by mouth daily as needed for erectile dysfunction (1-3 hours before intimacy) Maximum 1 dose per 24 hours. 30 tablet 12    vitamin D3 (CHOLECALCIFEROL) 10 MCG (400 UNIT) capsule Take 1 capsule by mouth daily      zolpidem (AMBIEN) 10 MG tablet Take 0.5-1 tablets (5-10 mg) by mouth nightly as needed for sleep (take right at bedtime) 90 tablet 1    flecainide (TAMBOCOR) 50 MG tablet Take 50 mg by mouth 2 times daily              Allergies:     Allergies: No known drug allergy          Past Medical History:     Past Medical History:   Diagnosis Date    Allergic rhinitis     Certain adverse effects,  not elsewhere classified, other     required more sedation preop R vein ablation    Colon adenoma 8/3/2012    colonoscopy due 2017     DJD (degenerative joint disease) of lumbar spine 2003    L4-L5 & L5-S1    Duodenal ulcer, unspecified as acute or chronic, without hemorrhage, perforation, or obstruction     Essential hypertension, benign 2006    GERD (gastroesophageal reflux disease)     PONV (postoperative nausea and vomiting)             Past Surgical History:     Past Surgical History:   Procedure Laterality Date    ABLATE VEIN VARICOSE RADIO FREQUENCY WITH PHLEBECTOMY MULTIPLE STAB  10/7/11, 11/4/11    RT, LT Dr. Cruz Robert    ABLATE VEIN VARICOSE RADIO FREQUENCY WITH PHLEBECTOMY MULTIPLE STAB  10/7/2011    Procedure:ABLATE VEIN VARICOSE RADIO FREQUENCY WITH PHLEBECTOMY MULTIPLE STAB; Right Radio Frenquency with Greater Saphenous with  Phlebectomy Multiple Stab; Surgeon:CRUZ ROBERT; [facility]    ABLATE VEIN VARICOSE RADIO FREQUENCY WITH PHLEBECTOMY MULTIPLE STAB  11/4/2011    Procedure:ABLATE VEIN VARICOSE RADIO FREQUENCY WITH PHLEBECTOMY MULTIPLE STAB; Left Radio Frenquency with Greater Saphenous Vein with  Bilateral Phlebectomy Multiple Stab Varicose Veins.; Surgeon:CRUZ ROBERT; [facility]    ABLATE VEIN VARICOSE RADIO FREQUENCY WITH PHLEBECTOMY MULTIPLE STAB  11/28/2012    Procedure: ABLATE VEIN VARICOSE RADIO FREQUENCY WITH PHLEBECTOMY MULTIPLE STAB;   Bilateral stab Micro- phlebectomies;  Surgeon: Dany Mascorro MD;  Location:  OR    ARTHROPLASTY KNEE Left 4/11/2019    Procedure: LEFT TOTAL KNEE ARTHROPLASTY/PERSONA;  Surgeon: Lamonte Cunha MD;  Location: [facility]    ARTHROPLASTY SHOULDER Left 11/13/2019    Procedure: LEFT TOTAL SHOULDER ARTHROPLASTY;  Surgeon: Alexandre Guillory MD;  Location: [facility]    ARTHROSCOPY KNEE  3/1/2013    Procedure: ARTHROSCOPY KNEE;  Right knee arthoscopy w/ medial menisectomy and chondroplasty;  Surgeon: Abiodun Jacobson MD;  Location:  OR     COLONOSCOPY N/A 2016    Procedure: COLONOSCOPY;  Surgeon: Calos Post MD;  Location: [facility]    COLONOSCOPY N/A 2017    Procedure: COLONOSCOPY;  COLONOSCOPY;  Surgeon: Calos Post MD;  [facility]: [facility]    FLEXIBLE SIGMOIDOSCOPY  07    HC EXCISION OF UVULA      for snoring    HC KNEE SCOPE,MED/LAT MENISECTOMY  05    RT + chondroplasty of medial femoral condyl & patellofemoral joint, Dr. Verma    HC PARTIAL REMOVAL/REPAIR,ACROMION  13    Left, [place]    HC REPAIR BICEPS LONG TENDON  13    Left    HERNIA REPAIR, INGUINAL RT/LT  01/10/08    LT w/onlay mesh patch, Dr. Mchugh    JOINT REPLACEMENT, HIP RT/LT      RT hip RESURFACING arthroplasty, Dr. Ra Valdivia @[facility] EXCIS BOWEL LESION(S),SINGLE ENTEROTOMY  1968    small intestine    Z RECONSTR TOTAL SHOULDER IMPLANT Right 10/24/2017    [facility] COLONOSCOPY W BIOPSY  8/3/12    proximal ascending, tubular adenoma       ENT family history reviewed         Social History:     Social History     Tobacco Use    Smoking status: Former     Types: Cigarettes     Quit date: 2003     Years since quittin.8    Smokeless tobacco: Never   Substance Use Topics    Alcohol use: Yes     Comment: 3-4 /week    Drug use: No            Review of Systems:     ROS: See HPI         Physical Exam:     /62 (BP Location: Right arm, Patient Position: Sitting, Cuff Size: Adult Regular)   Pulse 50   Temp 97.1  F (36.2  C) (Tympanic)   Ht 1.829 m (6')   Wt 103 kg (227 lb)   SpO2 97%   BMI 30.79 kg/m      General - The patient is well nourished and well developed, and appears to have good nutritional status.  Alert and oriented to person and place, answers questions and cooperates with examination appropriately.   Head and Face - Normocephalic and atraumatic, with no gross asymmetry noted.  The facial nerve is intact, with strong symmetric movements.  Voice and Breathing - The patient was breathing comfortably without the use of accessory  muscles. There was no wheezing, stridor. The patients voice was clear and strong, and had appropriate pitch and quality.  Ears - External ear normal. Canals are patent. Right tympanic membrane is intact without effusion, retraction or mass. Left tympanic membrane is intact without effusion, retraction or mass.  Eyes - Extraocular movements intact, sclera were not icteric or injected.  Mouth - Examination of the oral cavity showed pink, healthy oral mucosa. Dentition in good condition. No lesions or ulcerations noted. The tongue was mobile and midline.   Throat - The walls of the oropharynx were smooth, pink, moist, symmetric, and had no lesions or ulcerations, left peritonsillar edema with adequate airway.  No trismus.  Uvula is surgically absent  Neck - Normal midline excursion of the laryngotracheal complex during swallowing.  Full range of motion on passive movement.  Palpation of the occipital, submental, submandibular, internal jugular chain, and supraclavicular nodes did not demonstrate any abnormal lymph nodes or masses.  Palpation of the thyroid was soft and smooth, with no nodules or goiter appreciated.  The trachea was mobile and midline.  Nose - External contour is symmetric, no gross deflection or scars.  Nasal mucosa is pink and moist with no abnormal mucus.  The septum and turbinates were evaluated with nasal speculum, slight left DNS, no polyps, masses, or purulence noted on examination.    Attempts at mirror laryngoscopy were not possible due to gag reflex.  Therefore I proceeded with a fiberoptic examination after informed consent.  First I sprayed both sides of the nose with a mixture of lidocaine and neosynephrine.  I then passed the scope through the nasal cavity.     The nasal cavity was remarkable for slight left deviated nasal septum with septal spur.      The nasopharynx was mucosally covered and symmetric.  The eustachian tube openings were unobstructed.  Going further down I had a clear  view of the base of tongue which had normal appearing lingual tonsillar tissue.  There is left sided tonsillar edema with adequate airway, the left laryngeal surface of the epiglottis is slightly deviated to the right.  The base of tongue was free of lesions, and the vallecula was open.  The epiglottis was smooth and mucosally covered.  The supraglottic larynx was then clearly visualized.  The vocal cords moved smoothly and symmetrically and were pearly white.  The pyriform sinuses were open and without richard mass or pooling of secretions upon valsalva, and the limited view of the postcricoid region did not show any lesions.  The patient tolerated the procedure well.           Assessment and Plan:       ICD-10-CM    1. Peritonsillar abscess  J36 Adult ENT  Referral     dexAMETHasone (DECADRON) 4 MG tablet        Reassured airway is adequate at this time.  He is showing significant improvement with oral antibiotics.  We will add in some oral Decadron taper.    At this time no surgical indications.  I did advise him to watch closely for any significant worsening of symptoms and follow-up right away.    Follow-up in 3 weeks to assure complete resolution and assure that the left epiglottis appears normal.    He is happy with this plan and verbalized understanding    Lori FUNES  [facility]NT

## 2023-11-07 NOTE — LETTER
11/7/2023         RE: Ramy Guillermo  [address] [facility] [address]        Dear Colleague,    Thank you for referring your patient, Ramy Guillermo, to the [facility]. Please see a copy of my visit note below.    Otolaryngology Note         Chief Complaint:     Patient presents with:  Ent Problem: Peritonsillar abcess           History of Present Illness:     Ramy Guillermo is a 68 year old male seen today for concerns for left-sided peritonsillar abscess.      He reports he has had a sore throat that has been ebbing and flowing for the past 2 months.  On 11/5/2023 the symptoms worsen significantly.  Pain had increased to approximately 8 out of 10, he had difficulty swallowing secretions and felt that his airway was starting to close off.  He presented to the emergency room and CT soft tissue neck was completed showing left peritonsillar abscess measuring 1.7 cm in diameter.  He was treated with Decadron, methylprednisone and Unasyn IV in the emergency department and oral Augmentin on discharge.  A needle aspiration was attempted without significant return of purulent material.  No incision and drainage was attempted due to Eliquis.    Today he reports he is doing significantly better.  He is tolerating Augmentin well.  He is not on any Decadron at home.  He is able to swallow food and fluids. He has had improvement in voice, pain and swallowing.  Pain is rated at 1/10 now.  He is taking tylenol and naproxen or ibuprofen with good relief.    Quit smoking in 2002.  No history of heavy smoking.  No history of heavy etoh.  No personal history of cancer.    History of uvulectomy in the distant past.         Medications:     Current Outpatient Rx   Medication Sig Dispense Refill     amoxicillin-clavulanate (AUGMENTIN) 875-125 MG tablet Take 1 tablet by mouth 2 times daily for 10 days 20 tablet 0     apixaban ANTICOAGULANT (ELIQUIS) 5 MG tablet Take 5 mg by mouth 2 times daily        dexAMETHasone (DECADRON) 4 MG tablet Take 12 mg x 1 day, 10 mg x 1 day, 8 mg x 1 day 7.5 tablet 0     ferrous sulfate (FEROSUL) 325 (65 Fe) MG tablet Take 1 tablet (325 mg) by mouth daily (with breakfast)       ibuprofen (ADVIL/MOTRIN) 200 MG tablet Take 200 mg by mouth every 6 hours as needed for moderate pain        lisinopril (PRINIVIL,ZESTRIL) 20 MG tablet Take 1 tablet (20 mg) by mouth daily for blood pressure. 90 tablet 1     medical cannabis (Patient's own supply) 1 Dose See Admin Instructions (The purpose of this order is to document that the patient reports taking medical cannabis.  This is not a prescription, and is not used to certify that the patient has a qualifying medical condition.) Uses a topical, oral liquid and capsules from [employer]. Uses topical twice daily on his knee, and oral liquid at bedtime for pain management. Used capsules awhile back but doesn't have any more.       Multiple Vitamins-Minerals (MULTIVITAMIN ADULTS 50+ PO) Take 1 tablet by mouth daily       naproxen sodium (ALEVE) 220 MG tablet Take 220 mg by mouth 2 times daily as needed for moderate pain        Omega-3 Fatty Acids (FISH OIL) 600 MG CAPS Take 600 mg by mouth daily       omeprazole (PRILOSEC) 40 MG capsule Take 1 capsule (40 mg) by  mouth daily before a meal  for reflux. 90 capsule 1     oxybutynin (DITROPAN) 5 MG tablet Take 10 mg by mouth daily       pramipexole (MIRAPEX) 0.5 MG tablet Take 0.5 mg by mouth At Bedtime        PROAIR  (90 Base) MCG/ACT inhaler Inhale 1 puff into the lungs every 4 hours as needed  0     sildenafil (VIAGRA) 100 MG tablet Take 0.5-1 tablets ( mg) by mouth daily as needed for erectile dysfunction (1-3 hours before intimacy) Maximum 1 dose per 24 hours. 30 tablet 12     vitamin D3 (CHOLECALCIFEROL) 10 MCG (400 UNIT) capsule Take 1 capsule by mouth daily       zolpidem (AMBIEN) 10 MG tablet Take 0.5-1 tablets (5-10 mg) by mouth nightly as needed for sleep (take right at  bedtime) 90 tablet 1     flecainide (TAMBOCOR) 50 MG tablet Take 50 mg by mouth 2 times daily              Allergies:     Allergies: No known drug allergy          Past Medical History:     Past Medical History:   Diagnosis Date     Allergic rhinitis      Certain adverse effects, not elsewhere classified, other     required more sedation preop R vein ablation     Colon adenoma 8/3/2012    colonoscopy due 2017      DJD (degenerative joint disease) of lumbar spine 2003    L4-L5 & L5-S1     Duodenal ulcer, unspecified as acute or chronic, without hemorrhage, perforation, or obstruction      Essential hypertension, benign 2006     GERD (gastroesophageal reflux disease)      PONV (postoperative nausea and vomiting)             Past Surgical History:     Past Surgical History:   Procedure Laterality Date     ABLATE VEIN VARICOSE RADIO FREQUENCY WITH PHLEBECTOMY MULTIPLE STAB  10/7/11, 11/4/11    RT, LT Dr. Cruz Sung     ABLATE VEIN VARICOSE RADIO FREQUENCY WITH PHLEBECTOMY MULTIPLE STAB  10/7/2011    Procedure:ABLATE VEIN VARICOSE RADIO FREQUENCY WITH PHLEBECTOMY MULTIPLE STAB; Right Radio Frenquency with Greater Saphenous with  Phlebectomy Multiple Stab; Surgeon:CRUZ SUNG; [facility]     ABLATE VEIN VARICOSE RADIO FREQUENCY WITH PHLEBECTOMY MULTIPLE STAB  11/4/2011    Procedure:ABLATE VEIN VARICOSE RADIO FREQUENCY WITH PHLEBECTOMY MULTIPLE STAB; Left Radio Frenquency with Greater Saphenous Vein with  Bilateral Phlebectomy Multiple Stab Varicose Veins.; Surgeon:CRUZ SUNG; [facility]     ABLATE VEIN VARICOSE RADIO FREQUENCY WITH PHLEBECTOMY MULTIPLE STAB  11/28/2012    Procedure: ABLATE VEIN VARICOSE RADIO FREQUENCY WITH PHLEBECTOMY MULTIPLE STAB;   Bilateral stab Micro- phlebectomies;  Surgeon: Dany Mascorro MD;  Location: [facility]     ARTHROPLASTY KNEE Left 4/11/2019    Procedure: LEFT TOTAL KNEE ARTHROPLASTY/PERSONA;  Surgeon: Lamonte Cunha MD;  Location: [facility]     ARTHROPLASTY SHOULDER  Left 2019    Procedure: LEFT TOTAL SHOULDER ARTHROPLASTY;  Surgeon: Alexandre Guillory MD;  [facility]: [facility]     ARTHROSCOPY KNEE  3/1/2013    Procedure: ARTHROSCOPY KNEE;  Right knee arthoscopy w/ medial menisectomy and chondroplasty;  Surgeon: Abiodun Jacobson MD;  Location: [facility]     COLONOSCOPY N/A 2016    Procedure: COLONOSCOPY;  Surgeon: Calos Post MD;  [facility]: [facility]     COLONOSCOPY N/A 2017    Procedure: COLONOSCOPY;  COLONOSCOPY;  Surgeon: Calso Post MD;  Location: [facility]     FLEXIBLE SIGMOIDOSCOPY  07     HC EXCISION OF UVULA      for snoring     HC KNEE SCOPE,MED/LAT MENISECTOMY  05    RT + chondroplasty of medial femoral condyl & patellofemoral joint, Dr. Verma     HC PARTIAL REMOVAL/REPAIR,ACROMION  13    Left, [place]     HC REPAIR BICEPS LONG TENDON  13    Left     HERNIA REPAIR, INGUINAL RT/LT  01/10/08    LT w/onlay mesh patch, Dr. Mchugh     JOINT REPLACEMENT, HIP RT/LT      RT hip RESURFACING arthroplasty, Dr. Ra Valdivia @[facility] EXCIS BOWEL LESION(S),SINGLE ENTEROTOMY  1968    small intestine     [facility] RECONSTR TOTAL SHOULDER IMPLANT Right 10/24/2017     [facility] COLONOSCOPY W BIOPSY  8/3/12    proximal ascending, tubular adenoma       ENT family history reviewed         Social History:     Social History     Tobacco Use     Smoking status: Former     Types: Cigarettes     Quit date: 2003     Years since quittin.8     Smokeless tobacco: Never   Substance Use Topics     Alcohol use: Yes     Comment: 3-4 /week     Drug use: No            Review of Systems:     ROS: See HPI         Physical Exam:     /62 (BP Location: Right arm, Patient Position: Sitting, Cuff Size: Adult Regular)   Pulse 50   Temp 97.1  F (36.2  C) (Tympanic)   Ht 1.829 m (6')   Wt 103 kg (227 lb)   SpO2 97%   BMI 30.79 kg/m      General - The patient is well nourished and well developed, and appears to have good nutritional status.  Alert and oriented to  person and place, answers questions and cooperates with examination appropriately.   Head and Face - Normocephalic and atraumatic, with no gross asymmetry noted.  The facial nerve is intact, with strong symmetric movements.  Voice and Breathing - The patient was breathing comfortably without the use of accessory muscles. There was no wheezing, stridor. The patients voice was clear and strong, and had appropriate pitch and quality.  Ears - External ear normal. Canals are patent. Right tympanic membrane is intact without effusion, retraction or mass. Left tympanic membrane is intact without effusion, retraction or mass.  Eyes - Extraocular movements intact, sclera were not icteric or injected.  Mouth - Examination of the oral cavity showed pink, healthy oral mucosa. Dentition in good condition. No lesions or ulcerations noted. The tongue was mobile and midline.   Throat - The walls of the oropharynx were smooth, pink, moist, symmetric, and had no lesions or ulcerations, left peritonsillar edema with adequate airway.  No trismus.  Uvula is surgically absent  Neck - Normal midline excursion of the laryngotracheal complex during swallowing.  Full range of motion on passive movement.  Palpation of the occipital, submental, submandibular, internal jugular chain, and supraclavicular nodes did not demonstrate any abnormal lymph nodes or masses.  Palpation of the thyroid was soft and smooth, with no nodules or goiter appreciated.  The trachea was mobile and midline.  Nose - External contour is symmetric, no gross deflection or scars.  Nasal mucosa is pink and moist with no abnormal mucus.  The septum and turbinates were evaluated with nasal speculum, slight left DNS, no polyps, masses, or purulence noted on examination.    Attempts at mirror laryngoscopy were not possible due to gag reflex.  Therefore I proceeded with a fiberoptic examination after informed consent.  First I sprayed both sides of the nose with a mixture of  lidocaine and neosynephrine.  I then passed the scope through the nasal cavity.     The nasal cavity was remarkable for slight left deviated nasal septum with septal spur.      The nasopharynx was mucosally covered and symmetric.  The eustachian tube openings were unobstructed.  Going further down I had a clear view of the base of tongue which had normal appearing lingual tonsillar tissue.  There is left sided tonsillar edema with adequate airway, the left laryngeal surface of the epiglottis is slightly deviated to the right.  The base of tongue was free of lesions, and the vallecula was open.  The epiglottis was smooth and mucosally covered.  The supraglottic larynx was then clearly visualized.  The vocal cords moved smoothly and symmetrically and were pearly white.  The pyriform sinuses were open and without richard mass or pooling of secretions upon valsalva, and the limited view of the postcricoid region did not show any lesions.  The patient tolerated the procedure well.           Assessment and Plan:       ICD-10-CM    1. Peritonsillar abscess  J36 Adult ENT  Referral     dexAMETHasone (DECADRON) 4 MG tablet        Reassured airway is adequate at this time.  He is showing significant improvement with oral antibiotics.  We will add in some oral Decadron taper.    At this time no surgical indications.  I did advise him to watch closely for any significant worsening of symptoms and follow-up right away.    Follow-up in 3 weeks to assure complete resolution and assure that the left epiglottis appears normal.    He is happy with this plan and verbalized understanding    Lori FUNES  [facility] ENT      Again, thank you for allowing me to participate in the care of your patient.        Sincerely,        Lori Back NP

## 2023-11-29 ENCOUNTER — OFFICE VISIT (OUTPATIENT)
Dept: OTOLARYNGOLOGY | Facility: OTHER | Age: 68
End: 2023-11-29
Attending: NURSE PRACTITIONER
Payer: COMMERCIAL

## 2023-11-29 VITALS
BODY MASS INDEX: 30.88 KG/M2 | DIASTOLIC BLOOD PRESSURE: 82 MMHG | HEIGHT: 72 IN | HEART RATE: 50 BPM | TEMPERATURE: 97.9 F | WEIGHT: 228 LBS | OXYGEN SATURATION: 93 % | SYSTOLIC BLOOD PRESSURE: 138 MMHG

## 2023-11-29 DIAGNOSIS — J22 LRTI (LOWER RESPIRATORY TRACT INFECTION): Primary | ICD-10-CM

## 2023-11-29 DIAGNOSIS — Z87.09 HISTORY OF PERITONSILLAR ABSCESS: ICD-10-CM

## 2023-11-29 PROCEDURE — 31575 DIAGNOSTIC LARYNGOSCOPY: CPT | Performed by: NURSE PRACTITIONER

## 2023-11-29 PROCEDURE — 99213 OFFICE O/P EST LOW 20 MIN: CPT | Mod: 25 | Performed by: NURSE PRACTITIONER

## 2023-11-29 PROCEDURE — G0463 HOSPITAL OUTPT CLINIC VISIT: HCPCS | Mod: 25

## 2023-11-29 RX ORDER — AZITHROMYCIN 250 MG/1
TABLET, FILM COATED ORAL
Qty: 6 TABLET | Refills: 0 | Status: SHIPPED | OUTPATIENT
Start: 2023-11-29 | End: 2023-12-04

## 2023-11-29 ASSESSMENT — PAIN SCALES - GENERAL: PAINLEVEL: NO PAIN (0)

## 2023-11-29 NOTE — LETTER
11/29/2023         RE: Ramy Guillermo  [address] [facility] [address]        Dear Colleague,    Thank you for referring your patient, Ramy Guillermo, to the [facility]. Please see a copy of my visit note below.    Otolaryngology Note         Chief Complaint:     Patient presents with:  Follow Up: Peritonsillar Abscess            History of Present Illness:     Ramy Guillermo is a 68 year old male seen today for follow up left PTA.  He was last seen in ENT on 11/7/23 for the same.  He was treated with augmentin and decadron with resolution of throat pain and dysphagia.      He has recently had LRTI symptoms of cough, congestion, drainage, fatigue.  He has an upcoming cardiac ablation and is concerned about clearing up URI symptoms.  He is requesting Zpack          Medications:     Current Outpatient Rx   Medication Sig Dispense Refill     apixaban ANTICOAGULANT (ELIQUIS) 5 MG tablet Take 5 mg by mouth 2 times daily       azithromycin (ZITHROMAX) 250 MG tablet Take 2 tablets (500 mg) by mouth daily for 1 day, THEN 1 tablet (250 mg) daily for 4 days. 6 tablet 0     dexAMETHasone (DECADRON) 4 MG tablet Take 12 mg x 1 day, 10 mg x 1 day, 8 mg x 1 day 7.5 tablet 0     ferrous sulfate (FEROSUL) 325 (65 Fe) MG tablet Take 1 tablet (325 mg) by mouth daily (with breakfast)       ibuprofen (ADVIL/MOTRIN) 200 MG tablet Take 200 mg by mouth every 6 hours as needed for moderate pain        lisinopril (PRINIVIL,ZESTRIL) 20 MG tablet Take 1 tablet (20 mg) by mouth daily for blood pressure. 90 tablet 1     medical cannabis (Patient's own supply) 1 Dose See Admin Instructions (The purpose of this order is to document that the patient reports taking medical cannabis.  This is not a prescription, and is not used to certify that the patient has a qualifying medical condition.) Uses a topical, oral liquid and capsules from [employer]. Uses topical twice daily on his knee, and oral liquid at bedtime for  pain management. Used capsules awhile back but doesn't have any more.       Multiple Vitamins-Minerals (MULTIVITAMIN ADULTS 50+ PO) Take 1 tablet by mouth daily       naproxen sodium (ALEVE) 220 MG tablet Take 220 mg by mouth 2 times daily as needed for moderate pain        Omega-3 Fatty Acids (FISH OIL) 600 MG CAPS Take 600 mg by mouth daily       omeprazole (PRILOSEC) 40 MG capsule Take 1 capsule (40 mg) by  mouth daily before a meal  for reflux. 90 capsule 1     oxybutynin (DITROPAN) 5 MG tablet Take 10 mg by mouth daily       pramipexole (MIRAPEX) 0.5 MG tablet Take 0.5 mg by mouth At Bedtime        PROAIR  (90 Base) MCG/ACT inhaler Inhale 1 puff into the lungs every 4 hours as needed  0     sildenafil (VIAGRA) 100 MG tablet Take 0.5-1 tablets ( mg) by mouth daily as needed for erectile dysfunction (1-3 hours before intimacy) Maximum 1 dose per 24 hours. 30 tablet 12     vitamin D3 (CHOLECALCIFEROL) 10 MCG (400 UNIT) capsule Take 1 capsule by mouth daily       zolpidem (AMBIEN) 10 MG tablet Take 0.5-1 tablets (5-10 mg) by mouth nightly as needed for sleep (take right at bedtime) 90 tablet 1     flecainide (TAMBOCOR) 50 MG tablet Take 50 mg by mouth 2 times daily              Allergies:     Allergies: No known drug allergy          Past Medical History:     Past Medical History:   Diagnosis Date     Allergic rhinitis      Certain adverse effects, not elsewhere classified, other     required more sedation preop R vein ablation     Colon adenoma 8/3/2012    colonoscopy due 2017      DJD (degenerative joint disease) of lumbar spine 2003    L4-L5 & L5-S1     Duodenal ulcer, unspecified as acute or chronic, without hemorrhage, perforation, or obstruction      Essential hypertension, benign 2006     GERD (gastroesophageal reflux disease)      PONV (postoperative nausea and vomiting)             Past Surgical History:     Past Surgical History:   Procedure Laterality Date     ABLATE VEIN VARICOSE RADIO  FREQUENCY WITH PHLEBECTOMY MULTIPLE STAB  10/7/11, 11/4/11    RT, LT Dr. Cruz Robert     ABLATE VEIN VARICOSE RADIO FREQUENCY WITH PHLEBECTOMY MULTIPLE STAB  10/7/2011    Procedure:ABLATE VEIN VARICOSE RADIO FREQUENCY WITH PHLEBECTOMY MULTIPLE STAB; Right Radio Frenquency with Greater Saphenous with  Phlebectomy Multiple Stab; Surgeon:CRUZ ROBERT; [facility]     ABLATE VEIN VARICOSE RADIO FREQUENCY WITH PHLEBECTOMY MULTIPLE STAB  11/4/2011    Procedure:ABLATE VEIN VARICOSE RADIO FREQUENCY WITH PHLEBECTOMY MULTIPLE STAB; Left Radio Frenquency with Greater Saphenous Vein with  Bilateral Phlebectomy Multiple Stab Varicose Veins.; Surgeon:CRUZ ROBERT; [facility]     ABLATE VEIN VARICOSE RADIO FREQUENCY WITH PHLEBECTOMY MULTIPLE STAB  11/28/2012    Procedure: ABLATE VEIN VARICOSE RADIO FREQUENCY WITH PHLEBECTOMY MULTIPLE STAB;   Bilateral stab Micro- phlebectomies;  Surgeon: Dany Mascorro MD;  Location: [facility]     ARTHROPLASTY KNEE Left 4/11/2019    Procedure: LEFT TOTAL KNEE ARTHROPLASTY/PERSONA;  Surgeon: Lamonte Cunha MD;  Location: [facility]     ARTHROPLASTY SHOULDER Left 11/13/2019    Procedure: LEFT TOTAL SHOULDER ARTHROPLASTY;  Surgeon: Alexandre Guillory MD;  Location: [facility]     ARTHROSCOPY KNEE  3/1/2013    Procedure: ARTHROSCOPY KNEE;  Right knee arthoscopy w/ medial menisectomy and chondroplasty;  Surgeon: Abiodun Jacobson MD;  Location: [facility]     COLONOSCOPY N/A 8/30/2016    Procedure: COLONOSCOPY;  Surgeon: Calos Post MD;  Location: [facility]     COLONOSCOPY N/A 12/8/2017    Procedure: COLONOSCOPY;  COLONOSCOPY;  Surgeon: Calos Post MD;  Location: [facility]     FLEXIBLE SIGMOIDOSCOPY  1/9/07     HC EXCISION OF UVULA  2001    for snoring     HC KNEE SCOPE,MED/LAT MENISECTOMY  7/11/05    RT + chondroplasty of medial femoral condyl & patellofemoral joint, Dr. Verma     HC PARTIAL REMOVAL/REPAIR,ACROMION  12/20/13    Left, Sachin     HC REPAIR BICEPS LONG TENDON  12/20/13    Left      HERNIA REPAIR, INGUINAL RT/LT  01/10/08    LT w/onlay mesh patch, Dr. Mchugh     JOINT REPLACEMENT, HIP RT/LT      RT hip RESURFACING arthroplasty, Dr. Ra Valdivia @[facility] EXCIS BOWEL LESION(S),SINGLE ENTEROTOMY  1968    small intestine     [facility] RECONSTR TOTAL SHOULDER IMPLANT Right 10/24/2017     [facility] COLONOSCOPY W BIOPSY  8/3/12    proximal ascending, tubular adenoma       ENT family history reviewed         Social History:     Social History     Tobacco Use     Smoking status: Former     Types: Cigarettes     Quit date: 2003     Years since quittin.9     Smokeless tobacco: Never   Substance Use Topics     Alcohol use: Yes     Comment: 3-4 /week     Drug use: No            Review of Systems:     ROS: See HPI         Physical Exam:     /82 (BP Location: Right arm, Patient Position: Sitting, Cuff Size: Adult Regular)   Pulse 50   Temp 97.9  F (36.6  C) (Tympanic)   Ht 1.829 m (6')   Wt 103.4 kg (228 lb)   SpO2 93%   BMI 30.92 kg/m      General - The patient is well nourished and well developed, and appears to have good nutritional status.  Alert and oriented to person and place, answers questions and cooperates with examination appropriately.   Head and Face - Normocephalic and atraumatic, with no gross asymmetry noted.  The facial nerve is intact, with strong symmetric movements.  Voice and Breathing - The patient was breathing comfortably without the use of accessory muscles. There was no wheezing, stridor. The patients voice was clear and strong, and had appropriate pitch and quality.  Ears - External ear normal. Canals are patent. Right tympanic membrane is intact without effusion, retraction or mass. Left tympanic membrane is intact without effusion, retraction or mass.  Eyes - Extraocular movements intact, sclera were not icteric or injected.  Mouth - Examination of the oral cavity showed pink, healthy oral mucosa. Dentition in good condition. No lesions or ulcerations noted. The  tongue was mobile and midline.   Throat - The walls of the oropharynx were smooth, pink, moist, symmetric, and had no lesions or ulcerations.  The tonsillar pillars and soft palate were symmetric. The uvula was midline on elevation.    Neck - Normal ROM, no palpable lymphadenopathy.   Palpation of the thyroid was soft and smooth, with no nodules or goiter appreciated.  The trachea was mobile and midline.  Nose - External contour is symmetric, no gross deflection or scars.  Nasal mucosa is pink and moist with no abnormal mucus.    Lungs CTA  Heart S1, S2 baldemar, regular    Attempts at mirror laryngoscopy were not possible due to gag reflex.  Therefore I proceeded with a fiberoptic examination after informed consent.  First I sprayed both sides of the nose with a mixture of lidocaine and neosynephrine.  I then passed the scope through the nasal cavity.     The nasopharynx was mucosally covered and symmetric.  The eustachian tube openings were unobstructed.  Going further down I had a clear view of the base of tongue which had normal appearing lingual tonsillar tissue.  The base of tongue was free of lesions, and the vallecula was open.  The epiglottis was smooth and mucosally covered.  The supraglottic larynx was then clearly visualized.  The vocal cords moved smoothly and symmetrically and were pearly white.  The pyriform sinuses were open and without richard mass or pooling of secretions upon valsalva, and the limited view of the postcricoid region did not show any lesions.  The patient tolerated the procedure well.           Assessment and Plan:       ICD-10-CM    1. LRTI (lower respiratory tract infection)  J22 azithromycin (ZITHROMAX) 250 MG tablet      2. History of peritonsillar abscess  Z87.09           Follow up as needed  Start zpack is prescribed    Lori Back NP-C  [facility] ENT      Again, thank you for allowing me to participate in the care of your patient.         Sincerely,        Lori Back, NP

## 2023-11-29 NOTE — PROGRESS NOTES
Otolaryngology Note         Chief Complaint:     Patient presents with:  Follow Up: Peritonsillar Abscess            History of Present Illness:     Ramy Guillermo is a 68 year old male seen today for follow up left PTA.  He was last seen in ENT on 11/7/23 for the same.  He was treated with augmentin and decadron with resolution of throat pain and dysphagia.      He has recently had LRTI symptoms of cough, congestion, drainage, fatigue.  He has an upcoming cardiac ablation and is concerned about clearing up URI symptoms.  He is requesting Zpack          Medications:     Current Outpatient Rx   Medication Sig Dispense Refill    apixaban ANTICOAGULANT (ELIQUIS) 5 MG tablet Take 5 mg by mouth 2 times daily      azithromycin (ZITHROMAX) 250 MG tablet Take 2 tablets (500 mg) by mouth daily for 1 day, THEN 1 tablet (250 mg) daily for 4 days. 6 tablet 0    dexAMETHasone (DECADRON) 4 MG tablet Take 12 mg x 1 day, 10 mg x 1 day, 8 mg x 1 day 7.5 tablet 0    ferrous sulfate (FEROSUL) 325 (65 Fe) MG tablet Take 1 tablet (325 mg) by mouth daily (with breakfast)      ibuprofen (ADVIL/MOTRIN) 200 MG tablet Take 200 mg by mouth every 6 hours as needed for moderate pain       lisinopril (PRINIVIL,ZESTRIL) 20 MG tablet Take 1 tablet (20 mg) by mouth daily for blood pressure. 90 tablet 1    medical cannabis (Patient's own supply) 1 Dose See Admin Instructions (The purpose of this order is to document that the patient reports taking medical cannabis.  This is not a prescription, and is not used to certify that the patient has a qualifying medical condition.) Uses a topical, oral liquid and capsules from [employer]. Uses topical twice daily on his knee, and oral liquid at bedtime for pain management. Used capsules awhile back but doesn't have any more.      Multiple Vitamins-Minerals (MULTIVITAMIN ADULTS 50+ PO) Take 1 tablet by mouth daily      naproxen sodium (ALEVE) 220 MG tablet Take 220 mg by mouth 2 times daily as needed for  moderate pain       Omega-3 Fatty Acids (FISH OIL) 600 MG CAPS Take 600 mg by mouth daily      omeprazole (PRILOSEC) 40 MG capsule Take 1 capsule (40 mg) by  mouth daily before a meal  for reflux. 90 capsule 1    oxybutynin (DITROPAN) 5 MG tablet Take 10 mg by mouth daily      pramipexole (MIRAPEX) 0.5 MG tablet Take 0.5 mg by mouth At Bedtime       PROAIR  (90 Base) MCG/ACT inhaler Inhale 1 puff into the lungs every 4 hours as needed  0    sildenafil (VIAGRA) 100 MG tablet Take 0.5-1 tablets ( mg) by mouth daily as needed for erectile dysfunction (1-3 hours before intimacy) Maximum 1 dose per 24 hours. 30 tablet 12    vitamin D3 (CHOLECALCIFEROL) 10 MCG (400 UNIT) capsule Take 1 capsule by mouth daily      zolpidem (AMBIEN) 10 MG tablet Take 0.5-1 tablets (5-10 mg) by mouth nightly as needed for sleep (take right at bedtime) 90 tablet 1    flecainide (TAMBOCOR) 50 MG tablet Take 50 mg by mouth 2 times daily              Allergies:     Allergies: No known drug allergy          Past Medical History:     Past Medical History:   Diagnosis Date    Allergic rhinitis     Certain adverse effects, not elsewhere classified, other     required more sedation preop R vein ablation    Colon adenoma 8/3/2012    colonoscopy due 2017     DJD (degenerative joint disease) of lumbar spine 2003    L4-L5 & L5-S1    Duodenal ulcer, unspecified as acute or chronic, without hemorrhage, perforation, or obstruction     Essential hypertension, benign 2006    GERD (gastroesophageal reflux disease)     PONV (postoperative nausea and vomiting)             Past Surgical History:     Past Surgical History:   Procedure Laterality Date    ABLATE VEIN VARICOSE RADIO FREQUENCY WITH PHLEBECTOMY MULTIPLE STAB  10/7/11, 11/4/11    RT, LT Dr. Cruz Sung    ABLATE VEIN VARICOSE RADIO FREQUENCY WITH PHLEBECTOMY MULTIPLE STAB  10/7/2011    Procedure:ABLATE VEIN VARICOSE RADIO FREQUENCY WITH PHLEBECTOMY MULTIPLE STAB; Right Radio Frenquency with  Greater Saphenous with  Phlebectomy Multiple Stab; Surgeon:MURPHY ROBERT; Location[facility]    ABLATE VEIN VARICOSE RADIO FREQUENCY WITH PHLEBECTOMY MULTIPLE STAB  11/4/2011    Procedure:ABLATE VEIN VARICOSE RADIO FREQUENCY WITH PHLEBECTOMY MULTIPLE STAB; Left Radio Frenquency with Greater Saphenous Vein with  Bilateral Phlebectomy Multiple Stab Varicose Veins.; Surgeon:MURPHY ROBERT; Location:[facility]    ABLATE VEIN VARICOSE RADIO FREQUENCY WITH PHLEBECTOMY MULTIPLE STAB  11/28/2012    Procedure: ABLATE VEIN VARICOSE RADIO FREQUENCY WITH PHLEBECTOMY MULTIPLE STAB;   Bilateral stab Micro- phlebectomies;  Surgeon: Dany Mascorro MD;  Location: [facility]    ARTHROPLASTY KNEE Left 4/11/2019    Procedure: LEFT TOTAL KNEE ARTHROPLASTY/PERSONA;  Surgeon: Lamonte Cunha MD;  Location: [facility]    ARTHROPLASTY SHOULDER Left 11/13/2019    Procedure: LEFT TOTAL SHOULDER ARTHROPLASTY;  Surgeon: Alexandre Guillory MD;  Location: [facility]    ARTHROSCOPY KNEE  3/1/2013    Procedure: ARTHROSCOPY KNEE;  Right knee arthoscopy w/ medial menisectomy and chondroplasty;  Surgeon: Abiodun Jacobson MD;  Location: [facility]    COLONOSCOPY N/A 8/30/2016    Procedure: COLONOSCOPY;  Surgeon: Calos Post MD;  Location: [facility]    COLONOSCOPY N/A 12/8/2017    Procedure: COLONOSCOPY;  COLONOSCOPY;  Surgeon: Calos Post MD;  [facility]: [facility]    FLEXIBLE SIGMOIDOSCOPY  1/9/07    HC EXCISION OF UVULA  2001    for snoring    HC KNEE SCOPE,MED/LAT MENISECTOMY  7/11/05    RT + chondroplasty of medial femoral condyl & patellofemoral joint, Dr. Verma    HC PARTIAL REMOVAL/REPAIR,ACROMION  12/20/13    Left, Sachin    HC REPAIR BICEPS LONG TENDON  12/20/13    Left    HERNIA REPAIR, INGUINAL RT/LT  01/10/08    LT w/onlay mesh patch, Dr. Mchugh    JOINT REPLACEMENT, HIP RT/LT  9/06    RT hip RESURFACING arthroplasty, Dr. Ra Valdivia @[facility] EXCIS BOWEL LESION(S),SINGLE ENTEROTOMY  1968    small intestine    [facility] RECONSTR TOTAL SHOULDER IMPLANT Right  10/24/2017    [facility] COLONOSCOPY W BIOPSY  8/3/12    proximal ascending, tubular adenoma       ENT family history reviewed         Social History:     Social History     Tobacco Use    Smoking status: Former     Types: Cigarettes     Quit date: 2003     Years since quittin.9    Smokeless tobacco: Never   Substance Use Topics    Alcohol use: Yes     Comment: 3-4 /week    Drug use: No            Review of Systems:     ROS: See HPI         Physical Exam:     /82 (BP Location: Right arm, Patient Position: Sitting, Cuff Size: Adult Regular)   Pulse 50   Temp 97.9  F (36.6  C) (Tympanic)   Ht 1.829 m (6')   Wt 103.4 kg (228 lb)   SpO2 93%   BMI 30.92 kg/m      General - The patient is well nourished and well developed, and appears to have good nutritional status.  Alert and oriented to person and place, answers questions and cooperates with examination appropriately.   Head and Face - Normocephalic and atraumatic, with no gross asymmetry noted.  The facial nerve is intact, with strong symmetric movements.  Voice and Breathing - The patient was breathing comfortably without the use of accessory muscles. There was no wheezing, stridor. The patients voice was clear and strong, and had appropriate pitch and quality.  Ears - External ear normal. Canals are patent. Right tympanic membrane is intact without effusion, retraction or mass. Left tympanic membrane is intact without effusion, retraction or mass.  Eyes - Extraocular movements intact, sclera were not icteric or injected.  Mouth - Examination of the oral cavity showed pink, healthy oral mucosa. Dentition in good condition. No lesions or ulcerations noted. The tongue was mobile and midline.   Throat - The walls of the oropharynx were smooth, pink, moist, symmetric, and had no lesions or ulcerations.  The tonsillar pillars and soft palate were symmetric. The uvula was midline on elevation.    Neck - Normal ROM, no palpable lymphadenopathy.   Palpation of  the thyroid was soft and smooth, with no nodules or goiter appreciated.  The trachea was mobile and midline.  Nose - External contour is symmetric, no gross deflection or scars.  Nasal mucosa is pink and moist with no abnormal mucus.    Lungs CTA  Heart S1, S2 baldemar, regular    Attempts at mirror laryngoscopy were not possible due to gag reflex.  Therefore I proceeded with a fiberoptic examination after informed consent.  First I sprayed both sides of the nose with a mixture of lidocaine and neosynephrine.  I then passed the scope through the nasal cavity.     The nasopharynx was mucosally covered and symmetric.  The eustachian tube openings were unobstructed.  Going further down I had a clear view of the base of tongue which had normal appearing lingual tonsillar tissue.  The base of tongue was free of lesions, and the vallecula was open.  The epiglottis was smooth and mucosally covered.  The supraglottic larynx was then clearly visualized.  The vocal cords moved smoothly and symmetrically and were pearly white.  The pyriform sinuses were open and without richard mass or pooling of secretions upon valsalva, and the limited view of the postcricoid region did not show any lesions.  The patient tolerated the procedure well.           Assessment and Plan:       ICD-10-CM    1. LRTI (lower respiratory tract infection)  J22 azithromycin (ZITHROMAX) 250 MG tablet      2. History of peritonsillar abscess  Z87.09           Follow up as needed  Start zpack is prescribed    Lori Back NP-C  [facility] ENT

## 2023-11-29 NOTE — PATIENT INSTRUCTIONS
Thank you for allowing Lori Back and our ENT team to participate in your care.  If your medications are too expensive, please give the nurse a call.  We can possibly change this medication.  If you have a scheduling or an appointment question please contact our Health Unit Coordinator at their direct line [phone].   ALL nursing questions or concerns can be directed to your ENT nurse at: [phone] - ann     Follow up as needed  Start zpack is prescribed

## 2024-02-26 ENCOUNTER — APPOINTMENT (OUTPATIENT)
Dept: GENERAL RADIOLOGY | Facility: HOSPITAL | Age: 69
End: 2024-02-26
Payer: COMMERCIAL

## 2024-02-26 ENCOUNTER — HOSPITAL ENCOUNTER (EMERGENCY)
Facility: HOSPITAL | Age: 69
Discharge: HOME | End: 2024-02-26
Payer: COMMERCIAL

## 2024-02-26 VITALS
SYSTOLIC BLOOD PRESSURE: 148 MMHG | DIASTOLIC BLOOD PRESSURE: 81 MMHG | HEART RATE: 56 BPM | OXYGEN SATURATION: 96 % | TEMPERATURE: 97.7 F | RESPIRATION RATE: 19 BRPM

## 2024-02-26 DIAGNOSIS — J20.9 ACUTE BRONCHITIS WITH SYMPTOMS > 10 DAYS: ICD-10-CM

## 2024-02-26 PROCEDURE — G0463 HOSPITAL OUTPT CLINIC VISIT: HCPCS | Mod: 25

## 2024-02-26 PROCEDURE — 71046 X-RAY EXAM CHEST 2 VIEWS: CPT

## 2024-02-26 PROCEDURE — 99213 OFFICE O/P EST LOW 20 MIN: CPT

## 2024-02-26 RX ORDER — BENZONATATE 100 MG/1
100 CAPSULE ORAL 3 TIMES DAILY PRN
Qty: 21 CAPSULE | Refills: 0 | Status: SHIPPED | OUTPATIENT
Start: 2024-02-26

## 2024-02-26 RX ORDER — PREDNISONE 20 MG/1
TABLET ORAL
Qty: 10 TABLET | Refills: 0 | Status: SHIPPED | OUTPATIENT
Start: 2024-02-26

## 2024-02-26 ASSESSMENT — ENCOUNTER SYMPTOMS
SINUS PAIN: 1
FEVER: 0
ACTIVITY CHANGE: 0
COUGH: 1
SORE THROAT: 1
CHILLS: 0
NAUSEA: 0
SHORTNESS OF BREATH: 1
VOMITING: 0
DIARRHEA: 0
APPETITE CHANGE: 0
ABDOMINAL PAIN: 0
SINUS PRESSURE: 1

## 2024-02-26 ASSESSMENT — COLUMBIA-SUICIDE SEVERITY RATING SCALE - C-SSRS
1. IN THE PAST MONTH, HAVE YOU WISHED YOU WERE DEAD OR WISHED YOU COULD GO TO SLEEP AND NOT WAKE UP?: NO
2. HAVE YOU ACTUALLY HAD ANY THOUGHTS OF KILLING YOURSELF IN THE PAST MONTH?: NO
6. HAVE YOU EVER DONE ANYTHING, STARTED TO DO ANYTHING, OR PREPARED TO DO ANYTHING TO END YOUR LIFE?: NO

## 2024-02-26 ASSESSMENT — ACTIVITIES OF DAILY LIVING (ADL): ADLS_ACUITY_SCORE: 37

## 2024-02-26 NOTE — DISCHARGE INSTRUCTIONS
Antibiotics 2 times daily for 7 days. Yogurt or probiotic while taking.   Prednisone once daily for 5 days.   Robitussin every 4 hours as needed for cough.   Return with any chest pain, shortness of breath, uncontrolled fevers, or other concerns   Follow up in the clinic for a recheck.

## 2024-02-26 NOTE — ED PROVIDER NOTES
History     Chief Complaint   Patient presents with    Cough     HPI  Ramy Guillermo is a 68 year old male who presents to the urgent care with an ongoing cough for the last 3 months with some shortness of breath. Cough has been worse over the last 2 weeks. He denies chest pain, abd pain, and n/v/d. No fevers. Has been eating and drinking. Dx with covid 1/ 2024. Non smoker. No hx of asthma or COPD. Does have a hx of a fib, anticoagulated. No recent abx. Has been taking Delsym without change.     Allergies:  Allergies   Allergen Reactions    No Known Drug Allergy        Problem List:    Patient Active Problem List    Diagnosis Date Noted    S/P shoulder replacement, left 11/13/2019     Priority: Medium    Left knee pain 04/11/2019     Priority: Medium    Primary osteoarthritis of left knee 09/04/2018     Priority: Medium    History of total right knee replacement 09/04/2018     Priority: Medium    Primary osteoarthritis of right shoulder 05/30/2017     Priority: Medium    History of total knee arthroplasty, right 01/05/2017     Priority: Medium    History of total hip replacement 03/21/2016     Priority: Medium    H/O arthroscopic knee surgery 03/21/2016     Priority: Medium    S/P rotator cuff repair 03/21/2016     Priority: Medium    Family history of colon cancer 06/05/2015     Priority: Medium     Father age 80.       Rotator cuff dysfunction 06/03/2014     Priority: Medium    Shoulder impingement 02/04/2014     Priority: Medium    Dysphagia 12/27/2013     Priority: Medium    Velopharyngeal insufficiency, acquired 12/27/2013     Priority: Medium    Dysphonia 12/27/2013     Priority: Medium    LPRD (laryngopharyngeal reflux disease) 12/27/2013     Priority: Medium    Complete rupture of rotator cuff 12/18/2013     Priority: Medium    Overweight (BMI 25.0-29.9) 12/13/2013     Priority: Medium    Biceps tendonitis 11/18/2013     Priority: Medium    Osteoarthritis of shoulder 11/13/2013     Priority: Medium    S/P  arthroscopy of right knee 03/12/2013     Priority: Medium    Chondromalacia, knee - right 02/12/2013     Priority: Medium    Lateral meniscus tear - right 02/12/2013     Priority: Medium    Medial meniscus tear - right 02/12/2013     Priority: Medium    Erectile dysfunction 09/28/2012     Priority: Medium    Varicose veins of lower extremities with complications 08/20/2012     Priority: Medium     Problem list name updated by automated process. Provider to review      History of adenomatous polyp of colon 08/03/2012     Priority: Medium     colonoscopy due 2017      Advanced directives, counseling/discussion 07/22/2011     Priority: Medium     Discussed advance care planning with patient; information given to patient to review. 7/22/2011        Hypertension goal BP (blood pressure) < 140/90 12/22/2010     Priority: Medium    CARDIOVASCULAR SCREENING; LDL GOAL LESS THAN 130 10/31/2010     Priority: Medium     [place] Risk Score: 12% (13 Total Points)      Family history of early CAD 12/11/2009     Priority: Medium     brother      Overactive bladder 12/11/2009     Priority: Medium    Essential hypertension, benign      Priority: Medium    GERD (gastroesophageal reflux disease)      Priority: Medium    Seasonal allergic rhinitis      Priority: Medium    Lumbar spondylosis      Priority: Medium     L4-L5 & L5-S1  Do you wish to do the replacement in the background? yes            Past Medical History:    Past Medical History:   Diagnosis Date    Allergic rhinitis     Certain adverse effects, not elsewhere classified, other     Colon adenoma 8/3/2012    DJD (degenerative joint disease) of lumbar spine 2003    Duodenal ulcer, unspecified as acute or chronic, without hemorrhage, perforation, or obstruction     Essential hypertension, benign 2006    GERD (gastroesophageal reflux disease)     PONV (postoperative nausea and vomiting)        Past Surgical History:    Past Surgical History:   Procedure Laterality Date     ABLATE VEIN VARICOSE RADIO FREQUENCY WITH PHLEBECTOMY MULTIPLE STAB  10/7/11, 11/4/11    RT, LT Dr. Cruz Robert    ABLATE VEIN VARICOSE RADIO FREQUENCY WITH PHLEBECTOMY MULTIPLE STAB  10/7/2011    Procedure:ABLATE VEIN VARICOSE RADIO FREQUENCY WITH PHLEBECTOMY MULTIPLE STAB; Right Radio Frenquency with Greater Saphenous with  Phlebectomy Multiple Stab; Surgeon:CRUZ ROBERT; Location:[facility]    ABLATE VEIN VARICOSE RADIO FREQUENCY WITH PHLEBECTOMY MULTIPLE STAB  11/4/2011    Procedure:ABLATE VEIN VARICOSE RADIO FREQUENCY WITH PHLEBECTOMY MULTIPLE STAB; Left Radio Frenquency with Greater Saphenous Vein with  Bilateral Phlebectomy Multiple Stab Varicose Veins.; Surgeon:CRUZ ROBERT; Location:[facility]    ABLATE VEIN VARICOSE RADIO FREQUENCY WITH PHLEBECTOMY MULTIPLE STAB  11/28/2012    Procedure: ABLATE VEIN VARICOSE RADIO FREQUENCY WITH PHLEBECTOMY MULTIPLE STAB;   Bilateral stab Micro- phlebectomies;  Surgeon: Dany Mascorro MD;  Location: [facility]    ARTHROPLASTY KNEE Left 4/11/2019    Procedure: LEFT TOTAL KNEE ARTHROPLASTY/PERSONA;  Surgeon: Lamonte Cunha MD;  Location: [facility]    ARTHROPLASTY SHOULDER Left 11/13/2019    Procedure: LEFT TOTAL SHOULDER ARTHROPLASTY;  Surgeon: Alexandre Guillory MD;  Location: [facility]    ARTHROSCOPY KNEE  3/1/2013    Procedure: ARTHROSCOPY KNEE;  Right knee arthoscopy w/ medial menisectomy and chondroplasty;  Surgeon: Abiodun Jacobson MD;  Location: [facility]    COLONOSCOPY N/A 8/30/2016    Procedure: COLONOSCOPY;  Surgeon: Calos Post MD;  Location: [facility]    COLONOSCOPY N/A 12/8/2017    Procedure: COLONOSCOPY;  COLONOSCOPY;  Surgeon: Calos Post MD;  Location: [facility]    FLEXIBLE SIGMOIDOSCOPY  1/9/07    HC EXCISION OF UVULA  2001    for snoring    HC KNEE SCOPE,MED/LAT MENISECTOMY  7/11/05    RT + chondroplasty of medial femoral condyl & patellofemoral joint, Dr. Verma    HC PARTIAL REMOVAL/REPAIR,ACROMION  12/20/13    Left, Sachin    HC REPAIR BICEPS LONG TENDON  12/20/13     Left    HERNIA REPAIR, INGUINAL RT/LT  01/10/08    LT w/onlay mesh patch, Dr. Mchugh    JOINT REPLACEMENT, HIP RT/LT      RT hip RESURFACING arthroplasty, Dr. Ra Valdivia @[facility] EXCIS BOWEL LESION(S),SINGLE ENTEROTOMY  1968    small intestine    [facility] RECONSTR TOTAL SHOULDER IMPLANT Right 10/24/2017    [facility] COLONOSCOPY W BIOPSY  8/3/12    proximal ascending, tubular adenoma       Family History:    Family History   Problem Relation Age of Onset    Hypertension Father     Gastrointestinal Disease Father         PUD    Colon Cancer Father 80        malignant rectal polyp    Myocardial Infarction Brother 48            Myocardial Infarction Mother     Thrombophilia Mother 75         of PE    Prostate Cancer Maternal Grandfather     Prostate Cancer Maternal Uncle     Prostate Cancer Paternal Uncle     Diabetes No family hx of     Anesthesia Reaction No family hx of     Blood Disease No family hx of        Social History:  Marital Status:   [2]  Social History     Tobacco Use    Smoking status: Former     Types: Cigarettes     Quit date: 2003     Years since quittin.1    Smokeless tobacco: Never   Substance Use Topics    Alcohol use: Yes     Comment: 3-4 /week    Drug use: No        Medications:    amoxicillin-clavulanate (AUGMENTIN) 875-125 MG tablet  apixaban ANTICOAGULANT (ELIQUIS) 5 MG tablet  benzonatate (TESSALON) 100 MG capsule  ferrous sulfate (FEROSUL) 325 (65 Fe) MG tablet  lisinopril (PRINIVIL,ZESTRIL) 20 MG tablet  Multiple Vitamins-Minerals (MULTIVITAMIN ADULTS 50+ PO)  Omega-3 Fatty Acids (FISH OIL) 600 MG CAPS  omeprazole (PRILOSEC) 40 MG capsule  oxybutynin (DITROPAN) 5 MG tablet  pramipexole (MIRAPEX) 0.5 MG tablet  predniSONE (DELTASONE) 20 MG tablet  PROAIR  (90 Base) MCG/ACT inhaler  vitamin D3 (CHOLECALCIFEROL) 10 MCG (400 UNIT) capsule  zolpidem (AMBIEN) 10 MG tablet  dexAMETHasone (DECADRON) 4 MG tablet  flecainide (TAMBOCOR) 50 MG tablet  ibuprofen  (ADVIL/MOTRIN) 200 MG tablet  medical cannabis (Patient's own supply)  naproxen sodium (ALEVE) 220 MG tablet  sildenafil (VIAGRA) 100 MG tablet          Review of Systems   Constitutional:  Negative for activity change, appetite change, chills and fever.   HENT:  Positive for congestion, ear pain, sinus pressure, sinus pain and sore throat.    Respiratory:  Positive for cough and shortness of breath.    Cardiovascular:  Negative for chest pain.   Gastrointestinal:  Negative for abdominal pain, diarrhea, nausea and vomiting.   All other systems reviewed and are negative.      Physical Exam   BP: 148/81  Pulse: 56  Temp: 97.7  F (36.5  C)  Resp: 19  SpO2: 96 %      Physical Exam  Vitals and nursing note reviewed.   Constitutional:       General: He is not in acute distress.     Appearance: Normal appearance. He is ill-appearing. He is not toxic-appearing or diaphoretic.   HENT:      Mouth/Throat:      Mouth: Mucous membranes are moist.      Pharynx: Oropharynx is clear. No oropharyngeal exudate or posterior oropharyngeal erythema.   Cardiovascular:      Rate and Rhythm: Regular rhythm. Bradycardia present.      Pulses: Normal pulses.      Heart sounds: Normal heart sounds. No murmur heard.  Pulmonary:      Effort: Pulmonary effort is normal.      Breath sounds: Normal breath sounds.   Neurological:      Mental Status: He is alert.         ED Course                 Procedures              Results for orders placed or performed during the hospital encounter of 02/26/24 (from the past 24 hour(s))   Chest XR,  PA & LAT    Narrative    Exam:  XR CHEST 2 VIEWS    HISTORY: cough and SOB.    COMPARISON:  12/16/2014    FINDINGS:     The cardiomediastinal contours are normal.      No focal consolidation, effusion, or pneumothorax.      No acute osseous abnormality.       Impression    IMPRESSION:      No acute cardiopulmonary process.      MARY GRACE WADSWORTH MD         SYSTEM ID:  [id number]       Medications - No data to  display    Assessments & Plan (with Medical Decision Making)     I have reviewed the nursing notes.    I have reviewed the findings, diagnosis, plan and need for follow up with the patient.  Ramy Guillermo is a 68 year old male who presents to the urgent care with an ongoing cough for the last 3 months with some shortness of breath. Cough has been worse over the last 2 weeks. He denies chest pain, abd pain, and n/v/d. No fevers. Has been eating and drinking. Dx with covid 1/ 2024. Non smoker. No hx of asthma or COPD. Does have a hx of a fib, anticoagulated. No recent abx. Has been taking Delsym without change.     MDM: vital signs normal, afebrile. Lungs clear, heart tones regular. Able to speak in complete sentences without difficulty. Non toxic in appearance with no noted distress. CXR reviewed with no acute cardiopulmonary process, per radiologist. Given his length of symptoms, augmentin and prednisone prescribed. Also prescribed tessalon. Supportive measures and return precautions discussed. He is in agreement with plan.     (J20.9) Acute bronchitis with symptoms > 10 days  Plan: Antibiotics 2 times daily for 7 days. Yogurt or probiotic while taking.   Prednisone once daily for 5 days.   Robitussin every 4 hours as needed for cough.   Return with any chest pain, shortness of breath, uncontrolled fevers, or other concerns   Follow up in the clinic for a recheck. Understanding verbalized.       Discharge Medication List as of 2/26/2024  8:44 AM        START taking these medications    Details   amoxicillin-clavulanate (AUGMENTIN) 875-125 MG tablet Take 1 tablet by mouth 2 times daily for 7 days, Disp-14 tablet, R-0, E-Prescribe      predniSONE (DELTASONE) 20 MG tablet Take two tablets (= 40mg) each day for 5 (five) days, Disp-10 tablet, R-0, E-Prescribe             Final diagnoses:   Acute bronchitis with symptoms > 10 days       2/26/2024   [facility]       Yesenia Walter NP  02/26/24 0905

## 2024-02-26 NOTE — ED TRIAGE NOTES
Pt presents with c/o cough   Ongoing for months,intermitting.    Cough, congestion/drainage

## 2024-03-07 DIAGNOSIS — J45.909 ASTHMA: Primary | ICD-10-CM

## 2024-03-12 ENCOUNTER — HOSPITAL ENCOUNTER (OUTPATIENT)
Dept: RESPIRATORY THERAPY | Facility: HOSPITAL | Age: 69
Discharge: HOME | End: 2024-03-12
Attending: FAMILY MEDICINE | Admitting: INTERNAL MEDICINE
Payer: COMMERCIAL

## 2024-03-12 DIAGNOSIS — J45.909 ASTHMA: ICD-10-CM

## 2024-03-12 LAB
ALLEN'S TEST: NO
BASE EXCESS BLDA CALC-SCNC: 1.3 MMOL/L (ref -3–3)
COHGB MFR BLD: 96.2 % (ref 95–96)
HCO3 BLD-SCNC: 26 MMOL/L (ref 21–28)
O2/TOTAL GAS SETTING VFR VENT: 21 %
PCO2 BLD: 42 MM HG (ref 35–45)
PH BLD: 7.4 [PH] (ref 7.35–7.45)
PO2 BLD: 80 MM HG (ref 80–105)
SAO2 % BLDA: 95 % (ref 92–100)

## 2024-03-12 PROCEDURE — 94010 BREATHING CAPACITY TEST: CPT | Mod: 26 | Performed by: INTERNAL MEDICINE

## 2024-03-12 PROCEDURE — 36600 WITHDRAWAL OF ARTERIAL BLOOD: CPT

## 2024-03-12 PROCEDURE — 94726 PLETHYSMOGRAPHY LUNG VOLUMES: CPT

## 2024-03-12 PROCEDURE — 94010 BREATHING CAPACITY TEST: CPT

## 2024-03-12 PROCEDURE — 94726 PLETHYSMOGRAPHY LUNG VOLUMES: CPT | Mod: 26 | Performed by: INTERNAL MEDICINE

## 2024-03-12 PROCEDURE — 94729 DIFFUSING CAPACITY: CPT

## 2024-03-12 PROCEDURE — 94729 DIFFUSING CAPACITY: CPT | Mod: 26 | Performed by: INTERNAL MEDICINE

## 2024-03-12 PROCEDURE — 82805 BLOOD GASES W/O2 SATURATION: CPT

## 2024-08-10 ENCOUNTER — HEALTH MAINTENANCE LETTER (OUTPATIENT)
Age: 69
End: 2024-08-10

## 2025-03-17 ENCOUNTER — MEDICAL CORRESPONDENCE (OUTPATIENT)
Dept: CT IMAGING | Facility: HOSPITAL | Age: 70
End: 2025-03-17

## 2025-03-19 ENCOUNTER — HOSPITAL ENCOUNTER (OUTPATIENT)
Dept: CT IMAGING | Facility: HOSPITAL | Age: 70
Discharge: HOME | End: 2025-03-19
Attending: PODIATRIST
Payer: COMMERCIAL

## 2025-03-19 DIAGNOSIS — M14.671 CHARCOT JOINT OF RIGHT FOOT: ICD-10-CM

## 2025-03-19 PROCEDURE — 73700 CT LOWER EXTREMITY W/O DYE: CPT | Mod: RT

## 2025-03-22 ENCOUNTER — HEALTH MAINTENANCE LETTER (OUTPATIENT)
Age: 70
End: 2025-03-22

## 2025-08-16 ENCOUNTER — HEALTH MAINTENANCE LETTER (OUTPATIENT)
Age: 70
End: 2025-08-16

## (undated) DEVICE — NDL-25G 1-1/2" NON-SAFETY

## (undated) DEVICE — LIGHT HANDLE COVER

## (undated) DEVICE — SKIN PREP-APPLICATOR PREP DURAPREP

## (undated) DEVICE — SOL-NACL 0.9% 1000ML

## (undated) DEVICE — GLV-8.0 PROTEXIS PI BLUE W/NEU-THERA LF/PF

## (undated) DEVICE — FIBERWIRE-BRAIDED #2 W/TAPERED NEEDLE 26.5MM 1/2 CIRCLE

## (undated) DEVICE — BLADE-SAGITTAL 25MM X 90MM X 1.19MM

## (undated) DEVICE — PATELLA REAMER BLADE

## (undated) DEVICE — SUTURE-VICRYL 1 CT-1 POP-OFF J741D

## (undated) DEVICE — NDL-18G 1 1/2" NON-SAFETY

## (undated) DEVICE — GLV-8.0 BIOGEL LATEX

## (undated) DEVICE — SUTURE-VICRYL 2-0 CT-1 VCP259H

## (undated) DEVICE — SUTURE-VICRYL 2-0 CT-1 J945H

## (undated) DEVICE — SUCTION TUBE-YANKAUR

## (undated) DEVICE — DRAPE-IOBAN 2 35CM X 35CM

## (undated) DEVICE — TUBING-SUCTION 20FT

## (undated) DEVICE — SUTURE-VICRYL 2-0 CP-1 VCP266H

## (undated) DEVICE — IRRIGATION-H2O 1000ML

## (undated) DEVICE — BDG-COBAN 4 INCH

## (undated) DEVICE — DRILL PIN HEADLESS TROCAR

## (undated) DEVICE — CEMENT MIXER-MIXEVAC III

## (undated) DEVICE — TOPICAL SKIN ADHESIVE EXOFIN

## (undated) DEVICE — SUTURE-SILK 0 TIE A306H

## (undated) DEVICE — SCD SLEEVE-KNEE REG.

## (undated) DEVICE — BLADE-SAG SAW 18MM X 90MM X 1.27MM

## (undated) DEVICE — PACK-KNEE-CUSTOM

## (undated) DEVICE — BLADE-SAGITTAL NARROW THICK NO-OFFSET

## (undated) DEVICE — SPONGE-LAPAROTOMY PADS 18 X 18

## (undated) DEVICE — CAUTERY PENCIL-SMOKE EVACUATION

## (undated) DEVICE — Device

## (undated) DEVICE — SYRINGE-20CC LUER LOCK

## (undated) DEVICE — GOWN-SURG XL/XLONG LVL 4 IMPERVIOUS

## (undated) DEVICE — LABEL-STERILE PREPRINTED FOR OR

## (undated) DEVICE — BDG-COBAN 6 INCH

## (undated) DEVICE — COVER-MAYO STAND 23" X 55"

## (undated) DEVICE — GLV-7.5 ORTHO

## (undated) DEVICE — DRAPE-U DRAPE-CLEAR 47" X 51"

## (undated) DEVICE — MARKER-SKIN REG

## (undated) DEVICE — FOOT PADS-TOTAL KNEE POSITIONER

## (undated) DEVICE — SLING-SUPER LARGE

## (undated) DEVICE — SENSOR-OXISENSOR II ADULT

## (undated) DEVICE — BLADE-SCALPEL #10

## (undated) DEVICE — BDG-ELASTIC 6 INCH DBL. STERILE

## (undated) DEVICE — DRSG-AQUACEL AG 3.5" X 14" SURGICAL

## (undated) DEVICE — NDL-BLUNT FILL 18G 1.5"

## (undated) DEVICE — SCREW-FEMALE HEX 2.5MM/25MM LENGTH

## (undated) DEVICE — APPLICATOR-CHLORAPREP 26ML TINTED CHG 2%+ 70% IPA-SURGICAL

## (undated) DEVICE — SUTURE-VICRYL #1 CT-1 UNDYED J261H

## (undated) DEVICE — STOCKINETTE IMPERVIOUS-X/LG 12X48

## (undated) DEVICE — SYRINGE-ASEPTO IRRIGATION

## (undated) DEVICE — CANISTER-SUCTION 2000CC

## (undated) DEVICE — CAUTERY-EXTENDED 4" EDGE COATED BLADE

## (undated) DEVICE — NDL COUNTER-20-40 CT MAGNET/FOAM BLOCK

## (undated) DEVICE — STAPLER-SKIN 35 WIDE STAPLES

## (undated) DEVICE — IRRIGATION-NACL 1000ML

## (undated) DEVICE — IRRIGATION-H2O 1000ML BAG

## (undated) DEVICE — DRAPE-THREE QUARTER (LARGE) SHEET

## (undated) DEVICE — LUBRICANT JELLY 2OZ. TUBE

## (undated) DEVICE — CAST PADDING-STERILE 6"

## (undated) DEVICE — LIMB HOLDER-QUILTED OR

## (undated) DEVICE — CUFF-DISP STERILE 30IN SINGLE BLADDER

## (undated) DEVICE — SUTURE-VICRYL 0 CP-1 VCP267H

## (undated) DEVICE — CAUTERY PAD-POLYHESIVE II ADULT

## (undated) DEVICE — BLADE-SCALPEL #15

## (undated) DEVICE — PULSE LAVAGE IRRIGATION SYSTEM

## (undated) DEVICE — CAUTERY PENCIL-W/SUCTION 10FR FOOTSWITCHING

## (undated) DEVICE — IRRIGATION-NACL 3000ML (BAG)

## (undated) DEVICE — PACK-SURG SHOULDER

## (undated) DEVICE — COVER-TABLE SHEET

## (undated) DEVICE — EXOFIN FUSION 4 X 22CM

## (undated) DEVICE — SYRINGE-10CC LUER LOCK

## (undated) RX ORDER — EPHEDRINE SULFATE 50 MG/ML
INJECTION, SOLUTION INTRAVENOUS
Status: DISPENSED
Start: 2019-11-13

## (undated) RX ORDER — LIDOCAINE HYDROCHLORIDE 20 MG/ML
INJECTION, SOLUTION EPIDURAL; INFILTRATION; INTRACAUDAL; PERINEURAL
Status: DISPENSED
Start: 2017-12-08

## (undated) RX ORDER — DEXAMETHASONE SODIUM PHOSPHATE 10 MG/ML
INJECTION, SOLUTION INTRAMUSCULAR; INTRAVENOUS
Status: DISPENSED
Start: 2019-11-13

## (undated) RX ORDER — FENTANYL CITRATE 50 UG/ML
INJECTION, SOLUTION INTRAMUSCULAR; INTRAVENOUS
Status: DISPENSED
Start: 2019-04-11

## (undated) RX ORDER — PROPOFOL 10 MG/ML
INJECTION, EMULSION INTRAVENOUS
Status: DISPENSED
Start: 2017-12-08

## (undated) RX ORDER — PROPOFOL 10 MG/ML
INJECTION, EMULSION INTRAVENOUS
Status: DISPENSED
Start: 2019-11-13

## (undated) RX ORDER — SODIUM CHLORIDE 9 MG/ML
INJECTION, SOLUTION INTRAVENOUS
Status: DISPENSED
Start: 2019-04-11

## (undated) RX ORDER — PHENYLEPHRINE HCL IN 0.9% NACL 1 MG/10 ML
SYRINGE (ML) INTRAVENOUS
Status: DISPENSED
Start: 2019-11-13

## (undated) RX ORDER — PROPOFOL 10 MG/ML
INJECTION, EMULSION INTRAVENOUS
Status: DISPENSED
Start: 2019-04-11

## (undated) RX ORDER — ONDANSETRON 2 MG/ML
INJECTION INTRAMUSCULAR; INTRAVENOUS
Status: DISPENSED
Start: 2019-04-11

## (undated) RX ORDER — TRANEXAMIC ACID 100 MG/ML
INJECTION, SOLUTION INTRAVENOUS
Status: DISPENSED
Start: 2019-04-11

## (undated) RX ORDER — DEXAMETHASONE SODIUM PHOSPHATE 10 MG/ML
INJECTION, SOLUTION INTRAMUSCULAR; INTRAVENOUS
Status: DISPENSED
Start: 2019-04-11

## (undated) RX ORDER — KETAMINE HYDROCHLORIDE 10 MG/ML
INJECTION, SOLUTION INTRAMUSCULAR; INTRAVENOUS
Status: DISPENSED
Start: 2019-11-13